# Patient Record
Sex: MALE | Race: WHITE | NOT HISPANIC OR LATINO | Employment: FULL TIME | RURAL
[De-identification: names, ages, dates, MRNs, and addresses within clinical notes are randomized per-mention and may not be internally consistent; named-entity substitution may affect disease eponyms.]

---

## 2018-01-22 ENCOUNTER — HISTORICAL (OUTPATIENT)
Dept: ADMINISTRATIVE | Facility: HOSPITAL | Age: 53
End: 2018-01-22

## 2018-01-23 LAB
LAB AP CLINICAL INFORMATION: NORMAL
LAB AP DIAGNOSIS - HISTORICAL: NORMAL
LAB AP GROSS PATHOLOGY - HISTORICAL: NORMAL
LAB AP SPECIMEN SUBMITTED - HISTORICAL: NORMAL

## 2020-05-21 ENCOUNTER — HISTORICAL (OUTPATIENT)
Dept: ADMINISTRATIVE | Facility: HOSPITAL | Age: 55
End: 2020-05-21

## 2020-08-23 ENCOUNTER — HISTORICAL (OUTPATIENT)
Dept: ADMINISTRATIVE | Facility: HOSPITAL | Age: 55
End: 2020-08-23

## 2020-08-23 LAB
BASOPHILS # BLD AUTO: 0.04 X10E3/UL (ref 0–0.2)
BASOPHILS NFR BLD AUTO: 0.8 % (ref 0–1)
EOSINOPHIL # BLD AUTO: 0.43 X10E3/UL (ref 0–0.5)
EOSINOPHIL NFR BLD AUTO: 8.1 % (ref 1–4)
ERYTHROCYTE [DISTWIDTH] IN BLOOD BY AUTOMATED COUNT: 12.2 % (ref 11.5–14.5)
HCT VFR BLD AUTO: 41.4 % (ref 40–54)
HGB BLD-MCNC: 15 G/DL (ref 13.5–18)
IMM GRANULOCYTES # BLD AUTO: 0.02 X10E3/UL (ref 0–0.04)
IMM GRANULOCYTES NFR BLD: 0.4 % (ref 0–0.4)
LYMPHOCYTES # BLD AUTO: 2.1 X10E3/UL (ref 1–4.8)
LYMPHOCYTES NFR BLD AUTO: 39.5 % (ref 27–41)
MCH RBC QN AUTO: 31.3 PG (ref 27–31)
MCHC RBC AUTO-ENTMCNC: 36.2 G/DL (ref 32–36)
MCV RBC AUTO: 86.4 FL (ref 80–96)
MONOCYTES # BLD AUTO: 0.5 X10E3/UL (ref 0–0.8)
MONOCYTES NFR BLD AUTO: 9.4 % (ref 2–6)
MPC BLD CALC-MCNC: 9.6 FL (ref 9.4–12.4)
NEUTROPHILS # BLD AUTO: 2.22 X10E3/UL (ref 1.8–7.7)
NEUTROPHILS NFR BLD AUTO: 41.8 % (ref 53–65)
PLATELET # BLD AUTO: 235 X10E3/UL (ref 150–400)
RBC # BLD AUTO: 4.79 X10E6/UL (ref 4.6–6.2)
WBC # BLD AUTO: 5.31 X10E3/UL (ref 4.5–11)

## 2020-10-06 ENCOUNTER — HISTORICAL (OUTPATIENT)
Dept: ADMINISTRATIVE | Facility: HOSPITAL | Age: 55
End: 2020-10-06

## 2020-10-06 LAB
ALBUMIN SERPL BCP-MCNC: 4.3 G/DL (ref 3.5–5)
ALP SERPL-CCNC: 71 U/L (ref 45–115)
ALT SERPL W P-5'-P-CCNC: 49 U/L (ref 16–61)
ANION GAP SERPL CALCULATED.3IONS-SCNC: 13 MMOL/L
AST SERPL W P-5'-P-CCNC: 27 U/L (ref 15–37)
BILIRUB DIRECT SERPL-MCNC: 0.2 MG/DL (ref 0–0.2)
BILIRUB SERPL-MCNC: 0.9 MG/DL (ref 0–1.2)
BUN SERPL-MCNC: 13 MG/DL (ref 9–20)
CALCIUM SERPL-MCNC: 9.4 MG/DL (ref 8.4–10.2)
CHLORIDE SERPL-SCNC: 102 MMOL/L (ref 98–107)
CHOLEST SERPL-MCNC: 138 MG/DL
CHOLEST/HDLC SERPL: 3.6 {RATIO}
CO2 SERPL-SCNC: 27 MMOL/L (ref 22–30)
CREAT SERPL-MCNC: 0.8 MG/DL (ref 0.6–1.3)
EST. AVERAGE GLUCOSE BLD GHB EST-MCNC: 150 MG/DL
GLUCOSE SERPL-MCNC: 153 MG/DL (ref 70–105)
HBA1C MFR BLD HPLC: 7.1 % (ref 4.5–6.6)
HDLC SERPL-MCNC: 38 MG/DL
LDLC SERPL CALC-MCNC: 58 MG/DL
POTASSIUM SERPL-SCNC: 4.3 MMOL/L (ref 3.6–5)
PROT SERPL-MCNC: 7.3 G/DL (ref 6.4–8.2)
SODIUM SERPL-SCNC: 138 MMOL/L (ref 137–145)
TRIGL SERPL-MCNC: 212 MG/DL

## 2021-01-28 ENCOUNTER — HISTORICAL (OUTPATIENT)
Dept: ADMINISTRATIVE | Facility: HOSPITAL | Age: 56
End: 2021-01-28

## 2021-01-28 LAB — GLUCOSE SERPL-MCNC: 121 MG/DL (ref 70–105)

## 2021-01-29 LAB

## 2021-03-06 ENCOUNTER — HISTORICAL (OUTPATIENT)
Dept: ADMINISTRATIVE | Facility: HOSPITAL | Age: 56
End: 2021-03-06

## 2021-03-09 ENCOUNTER — HISTORICAL (OUTPATIENT)
Dept: ADMINISTRATIVE | Facility: HOSPITAL | Age: 56
End: 2021-03-09

## 2021-05-04 DIAGNOSIS — Z98.890 S/P RIGHT KNEE ARTHROSCOPY: Primary | ICD-10-CM

## 2021-05-20 ENCOUNTER — CLINICAL SUPPORT (OUTPATIENT)
Dept: REHABILITATION | Facility: HOSPITAL | Age: 56
End: 2021-05-20
Payer: COMMERCIAL

## 2021-05-20 DIAGNOSIS — M25.60 DECREASED RANGE OF MOTION WITH DECREASED STRENGTH: ICD-10-CM

## 2021-05-20 DIAGNOSIS — R53.1 DECREASED RANGE OF MOTION WITH DECREASED STRENGTH: ICD-10-CM

## 2021-05-20 PROCEDURE — 97110 THERAPEUTIC EXERCISES: CPT | Mod: CQ

## 2021-05-20 PROCEDURE — 97530 THERAPEUTIC ACTIVITIES: CPT | Mod: CQ

## 2021-05-20 PROCEDURE — 97016 VASOPNEUMATIC DEVICE THERAPY: CPT | Mod: CQ

## 2021-05-21 ENCOUNTER — CLINICAL SUPPORT (OUTPATIENT)
Dept: REHABILITATION | Facility: HOSPITAL | Age: 56
End: 2021-05-21
Payer: COMMERCIAL

## 2021-05-21 DIAGNOSIS — R53.1 DECREASED RANGE OF MOTION WITH DECREASED STRENGTH: ICD-10-CM

## 2021-05-21 DIAGNOSIS — M25.60 DECREASED RANGE OF MOTION WITH DECREASED STRENGTH: ICD-10-CM

## 2021-05-21 PROCEDURE — 97016 VASOPNEUMATIC DEVICE THERAPY: CPT | Mod: CQ

## 2021-05-21 PROCEDURE — 97110 THERAPEUTIC EXERCISES: CPT | Mod: CQ

## 2021-05-21 PROCEDURE — 97530 THERAPEUTIC ACTIVITIES: CPT | Mod: CQ

## 2021-05-24 ENCOUNTER — CLINICAL SUPPORT (OUTPATIENT)
Dept: REHABILITATION | Facility: HOSPITAL | Age: 56
End: 2021-05-24
Payer: COMMERCIAL

## 2021-05-24 DIAGNOSIS — M25.60 DECREASED RANGE OF MOTION WITH DECREASED STRENGTH: ICD-10-CM

## 2021-05-24 DIAGNOSIS — R53.1 DECREASED RANGE OF MOTION WITH DECREASED STRENGTH: ICD-10-CM

## 2021-05-24 PROCEDURE — 97110 THERAPEUTIC EXERCISES: CPT | Mod: CQ

## 2021-05-24 PROCEDURE — 97530 THERAPEUTIC ACTIVITIES: CPT | Mod: CQ

## 2021-05-26 ENCOUNTER — CLINICAL SUPPORT (OUTPATIENT)
Dept: REHABILITATION | Facility: HOSPITAL | Age: 56
End: 2021-05-26
Payer: COMMERCIAL

## 2021-05-26 DIAGNOSIS — R53.1 DECREASED RANGE OF MOTION WITH DECREASED STRENGTH: ICD-10-CM

## 2021-05-26 DIAGNOSIS — M25.60 DECREASED RANGE OF MOTION WITH DECREASED STRENGTH: ICD-10-CM

## 2021-05-26 PROCEDURE — 97110 THERAPEUTIC EXERCISES: CPT

## 2021-05-26 PROCEDURE — 97530 THERAPEUTIC ACTIVITIES: CPT

## 2021-06-01 ENCOUNTER — CLINICAL SUPPORT (OUTPATIENT)
Dept: REHABILITATION | Facility: HOSPITAL | Age: 56
End: 2021-06-01
Payer: COMMERCIAL

## 2021-06-01 DIAGNOSIS — Z98.890 S/P RIGHT KNEE ARTHROSCOPY: ICD-10-CM

## 2021-06-01 DIAGNOSIS — R53.1 DECREASED RANGE OF MOTION WITH DECREASED STRENGTH: ICD-10-CM

## 2021-06-01 DIAGNOSIS — M25.60 DECREASED RANGE OF MOTION WITH DECREASED STRENGTH: ICD-10-CM

## 2021-06-01 PROCEDURE — 97530 THERAPEUTIC ACTIVITIES: CPT | Mod: CQ

## 2021-06-01 PROCEDURE — 97110 THERAPEUTIC EXERCISES: CPT | Mod: CQ

## 2021-06-03 ENCOUNTER — CLINICAL SUPPORT (OUTPATIENT)
Dept: REHABILITATION | Facility: HOSPITAL | Age: 56
End: 2021-06-03
Payer: COMMERCIAL

## 2021-06-03 DIAGNOSIS — M25.60 DECREASED RANGE OF MOTION WITH DECREASED STRENGTH: ICD-10-CM

## 2021-06-03 DIAGNOSIS — R53.1 DECREASED RANGE OF MOTION WITH DECREASED STRENGTH: ICD-10-CM

## 2021-06-03 PROCEDURE — 97530 THERAPEUTIC ACTIVITIES: CPT | Mod: CQ

## 2021-06-03 PROCEDURE — 97110 THERAPEUTIC EXERCISES: CPT | Mod: CQ

## 2021-06-04 ENCOUNTER — CLINICAL SUPPORT (OUTPATIENT)
Dept: REHABILITATION | Facility: HOSPITAL | Age: 56
End: 2021-06-04
Payer: COMMERCIAL

## 2021-06-04 DIAGNOSIS — M25.60 DECREASED RANGE OF MOTION WITH DECREASED STRENGTH: ICD-10-CM

## 2021-06-04 DIAGNOSIS — R53.1 DECREASED RANGE OF MOTION WITH DECREASED STRENGTH: ICD-10-CM

## 2021-06-04 PROCEDURE — 97110 THERAPEUTIC EXERCISES: CPT | Mod: CQ

## 2021-06-04 PROCEDURE — 97530 THERAPEUTIC ACTIVITIES: CPT | Mod: CQ

## 2021-06-07 ENCOUNTER — CLINICAL SUPPORT (OUTPATIENT)
Dept: REHABILITATION | Facility: HOSPITAL | Age: 56
End: 2021-06-07
Payer: COMMERCIAL

## 2021-06-07 DIAGNOSIS — M25.60 DECREASED RANGE OF MOTION WITH DECREASED STRENGTH: ICD-10-CM

## 2021-06-07 DIAGNOSIS — R53.1 DECREASED RANGE OF MOTION WITH DECREASED STRENGTH: ICD-10-CM

## 2021-06-07 PROCEDURE — 97110 THERAPEUTIC EXERCISES: CPT | Mod: CQ

## 2021-06-07 PROCEDURE — 97530 THERAPEUTIC ACTIVITIES: CPT | Mod: CQ

## 2021-06-09 ENCOUNTER — CLINICAL SUPPORT (OUTPATIENT)
Dept: REHABILITATION | Facility: HOSPITAL | Age: 56
End: 2021-06-09
Payer: COMMERCIAL

## 2021-06-09 DIAGNOSIS — R53.1 DECREASED RANGE OF MOTION WITH DECREASED STRENGTH: ICD-10-CM

## 2021-06-09 DIAGNOSIS — M25.60 DECREASED RANGE OF MOTION WITH DECREASED STRENGTH: ICD-10-CM

## 2021-06-09 PROCEDURE — 97530 THERAPEUTIC ACTIVITIES: CPT | Mod: CQ

## 2021-06-09 PROCEDURE — 97110 THERAPEUTIC EXERCISES: CPT | Mod: CQ

## 2021-06-11 ENCOUNTER — CLINICAL SUPPORT (OUTPATIENT)
Dept: REHABILITATION | Facility: HOSPITAL | Age: 56
End: 2021-06-11
Payer: COMMERCIAL

## 2021-06-11 DIAGNOSIS — R53.1 DECREASED RANGE OF MOTION WITH DECREASED STRENGTH: ICD-10-CM

## 2021-06-11 DIAGNOSIS — M25.60 DECREASED RANGE OF MOTION WITH DECREASED STRENGTH: ICD-10-CM

## 2021-06-11 PROCEDURE — 97530 THERAPEUTIC ACTIVITIES: CPT

## 2021-06-11 PROCEDURE — 97110 THERAPEUTIC EXERCISES: CPT

## 2021-06-15 ENCOUNTER — CLINICAL SUPPORT (OUTPATIENT)
Dept: REHABILITATION | Facility: HOSPITAL | Age: 56
End: 2021-06-15
Payer: COMMERCIAL

## 2021-06-15 DIAGNOSIS — R53.1 DECREASED RANGE OF MOTION WITH DECREASED STRENGTH: ICD-10-CM

## 2021-06-15 DIAGNOSIS — M25.60 DECREASED RANGE OF MOTION WITH DECREASED STRENGTH: ICD-10-CM

## 2021-06-15 PROCEDURE — 97530 THERAPEUTIC ACTIVITIES: CPT

## 2021-06-15 PROCEDURE — 97110 THERAPEUTIC EXERCISES: CPT

## 2021-06-16 ENCOUNTER — CLINICAL SUPPORT (OUTPATIENT)
Dept: REHABILITATION | Facility: HOSPITAL | Age: 56
End: 2021-06-16
Payer: COMMERCIAL

## 2021-06-16 DIAGNOSIS — M25.60 DECREASED RANGE OF MOTION WITH DECREASED STRENGTH: ICD-10-CM

## 2021-06-16 DIAGNOSIS — R53.1 DECREASED RANGE OF MOTION WITH DECREASED STRENGTH: ICD-10-CM

## 2021-06-16 PROCEDURE — 97110 THERAPEUTIC EXERCISES: CPT

## 2021-06-16 PROCEDURE — 97530 THERAPEUTIC ACTIVITIES: CPT

## 2021-06-18 ENCOUNTER — CLINICAL SUPPORT (OUTPATIENT)
Dept: REHABILITATION | Facility: HOSPITAL | Age: 56
End: 2021-06-18
Payer: COMMERCIAL

## 2021-06-18 DIAGNOSIS — M25.60 DECREASED RANGE OF MOTION WITH DECREASED STRENGTH: ICD-10-CM

## 2021-06-18 DIAGNOSIS — R53.1 DECREASED RANGE OF MOTION WITH DECREASED STRENGTH: ICD-10-CM

## 2021-06-18 PROCEDURE — 97530 THERAPEUTIC ACTIVITIES: CPT | Mod: CQ

## 2021-06-18 PROCEDURE — 97110 THERAPEUTIC EXERCISES: CPT | Mod: CQ

## 2021-06-22 ENCOUNTER — CLINICAL SUPPORT (OUTPATIENT)
Dept: REHABILITATION | Facility: HOSPITAL | Age: 56
End: 2021-06-22
Payer: COMMERCIAL

## 2021-06-22 DIAGNOSIS — R53.1 DECREASED RANGE OF MOTION WITH DECREASED STRENGTH: ICD-10-CM

## 2021-06-22 DIAGNOSIS — M25.60 DECREASED RANGE OF MOTION WITH DECREASED STRENGTH: ICD-10-CM

## 2021-06-22 PROCEDURE — 97530 THERAPEUTIC ACTIVITIES: CPT | Mod: CQ

## 2021-06-22 PROCEDURE — 97110 THERAPEUTIC EXERCISES: CPT | Mod: CQ

## 2021-06-23 ENCOUNTER — OFFICE VISIT (OUTPATIENT)
Dept: FAMILY MEDICINE | Facility: CLINIC | Age: 56
End: 2021-06-23
Payer: COMMERCIAL

## 2021-06-23 VITALS
HEART RATE: 74 BPM | DIASTOLIC BLOOD PRESSURE: 102 MMHG | SYSTOLIC BLOOD PRESSURE: 156 MMHG | HEIGHT: 72 IN | TEMPERATURE: 98 F | BODY MASS INDEX: 31.45 KG/M2 | WEIGHT: 232.19 LBS | OXYGEN SATURATION: 98 %

## 2021-06-23 DIAGNOSIS — M25.562 ACUTE PAIN OF LEFT KNEE: Primary | ICD-10-CM

## 2021-06-23 PROCEDURE — 99213 OFFICE O/P EST LOW 20 MIN: CPT | Mod: 25,,, | Performed by: FAMILY MEDICINE

## 2021-06-23 PROCEDURE — 99213 PR OFFICE/OUTPT VISIT, EST, LEVL III, 20-29 MIN: ICD-10-PCS | Mod: 25,,, | Performed by: FAMILY MEDICINE

## 2021-06-23 PROCEDURE — 20610 DRAIN/INJ JOINT/BURSA W/O US: CPT | Mod: LT,,, | Performed by: FAMILY MEDICINE

## 2021-06-23 PROCEDURE — 20610 LARGE JOINT ASPIRATION/INJECTION: L KNEE: ICD-10-PCS | Mod: LT,,, | Performed by: FAMILY MEDICINE

## 2021-06-23 RX ORDER — METHYLPREDNISOLONE ACETATE 80 MG/ML
40 INJECTION, SUSPENSION INTRA-ARTICULAR; INTRALESIONAL; INTRAMUSCULAR; SOFT TISSUE
Status: DISCONTINUED | OUTPATIENT
Start: 2021-06-23 | End: 2021-06-23 | Stop reason: HOSPADM

## 2021-06-23 RX ORDER — MINERAL OIL
180 ENEMA (ML) RECTAL DAILY
COMMUNITY

## 2021-06-23 RX ORDER — CLOPIDOGREL BISULFATE 75 MG/1
75 TABLET ORAL DAILY
COMMUNITY
End: 2021-11-02 | Stop reason: ALTCHOICE

## 2021-06-23 RX ORDER — ASPIRIN 81 MG/1
81 TABLET ORAL DAILY
COMMUNITY

## 2021-06-23 RX ORDER — ATORVASTATIN CALCIUM 80 MG/1
80 TABLET, FILM COATED ORAL DAILY
COMMUNITY

## 2021-06-23 RX ORDER — LISINOPRIL 2.5 MG/1
5 TABLET ORAL DAILY
COMMUNITY
Start: 2021-05-24 | End: 2021-11-02 | Stop reason: DRUGHIGH

## 2021-06-23 RX ORDER — CARVEDILOL 6.25 MG/1
6.25 TABLET ORAL 2 TIMES DAILY
COMMUNITY
Start: 2021-05-27

## 2021-06-23 RX ORDER — FLUTICASONE PROPIONATE 50 MCG
2 SPRAY, SUSPENSION (ML) NASAL DAILY
COMMUNITY
End: 2023-05-01 | Stop reason: SDUPTHER

## 2021-06-23 RX ORDER — MONTELUKAST SODIUM 10 MG/1
10 TABLET ORAL NIGHTLY
COMMUNITY
End: 2021-08-24 | Stop reason: SDUPTHER

## 2021-06-23 RX ORDER — MULTIVIT WITH MINERALS/HERBS
1 TABLET ORAL DAILY
COMMUNITY

## 2021-06-23 RX ADMIN — METHYLPREDNISOLONE ACETATE 40 MG: 80 INJECTION, SUSPENSION INTRA-ARTICULAR; INTRALESIONAL; INTRAMUSCULAR; SOFT TISSUE at 01:06

## 2021-06-24 ENCOUNTER — CLINICAL SUPPORT (OUTPATIENT)
Dept: REHABILITATION | Facility: HOSPITAL | Age: 56
End: 2021-06-24
Payer: COMMERCIAL

## 2021-06-24 DIAGNOSIS — R53.1 DECREASED RANGE OF MOTION WITH DECREASED STRENGTH: ICD-10-CM

## 2021-06-24 DIAGNOSIS — M25.60 DECREASED RANGE OF MOTION WITH DECREASED STRENGTH: ICD-10-CM

## 2021-06-24 PROCEDURE — 97016 VASOPNEUMATIC DEVICE THERAPY: CPT

## 2021-06-24 PROCEDURE — 97110 THERAPEUTIC EXERCISES: CPT

## 2021-06-24 PROCEDURE — 97530 THERAPEUTIC ACTIVITIES: CPT

## 2021-06-25 ENCOUNTER — CLINICAL SUPPORT (OUTPATIENT)
Dept: REHABILITATION | Facility: HOSPITAL | Age: 56
End: 2021-06-25
Payer: COMMERCIAL

## 2021-06-25 DIAGNOSIS — R53.1 DECREASED RANGE OF MOTION WITH DECREASED STRENGTH: ICD-10-CM

## 2021-06-25 DIAGNOSIS — M25.60 DECREASED RANGE OF MOTION WITH DECREASED STRENGTH: ICD-10-CM

## 2021-06-25 PROCEDURE — 97530 THERAPEUTIC ACTIVITIES: CPT | Mod: CQ

## 2021-06-25 PROCEDURE — 97110 THERAPEUTIC EXERCISES: CPT | Mod: CQ

## 2021-06-28 ENCOUNTER — CLINICAL SUPPORT (OUTPATIENT)
Dept: REHABILITATION | Facility: HOSPITAL | Age: 56
End: 2021-06-28
Payer: COMMERCIAL

## 2021-06-28 DIAGNOSIS — R53.1 DECREASED RANGE OF MOTION WITH DECREASED STRENGTH: ICD-10-CM

## 2021-06-28 DIAGNOSIS — M25.60 DECREASED RANGE OF MOTION WITH DECREASED STRENGTH: ICD-10-CM

## 2021-06-28 PROCEDURE — 97530 THERAPEUTIC ACTIVITIES: CPT

## 2021-06-28 PROCEDURE — 97110 THERAPEUTIC EXERCISES: CPT

## 2021-07-02 ENCOUNTER — CLINICAL SUPPORT (OUTPATIENT)
Dept: REHABILITATION | Facility: HOSPITAL | Age: 56
End: 2021-07-02
Payer: COMMERCIAL

## 2021-07-02 DIAGNOSIS — R53.1 DECREASED RANGE OF MOTION WITH DECREASED STRENGTH: ICD-10-CM

## 2021-07-02 DIAGNOSIS — M25.60 DECREASED RANGE OF MOTION WITH DECREASED STRENGTH: ICD-10-CM

## 2021-07-02 PROCEDURE — 97110 THERAPEUTIC EXERCISES: CPT | Mod: CQ

## 2021-07-02 PROCEDURE — 97530 THERAPEUTIC ACTIVITIES: CPT | Mod: CQ

## 2021-07-06 ENCOUNTER — CLINICAL SUPPORT (OUTPATIENT)
Dept: REHABILITATION | Facility: HOSPITAL | Age: 56
End: 2021-07-06
Payer: COMMERCIAL

## 2021-07-06 DIAGNOSIS — M25.60 DECREASED RANGE OF MOTION WITH DECREASED STRENGTH: ICD-10-CM

## 2021-07-06 DIAGNOSIS — R53.1 DECREASED RANGE OF MOTION WITH DECREASED STRENGTH: ICD-10-CM

## 2021-07-06 PROCEDURE — 97530 THERAPEUTIC ACTIVITIES: CPT

## 2021-07-06 PROCEDURE — 97110 THERAPEUTIC EXERCISES: CPT

## 2021-07-07 ENCOUNTER — CLINICAL SUPPORT (OUTPATIENT)
Dept: REHABILITATION | Facility: HOSPITAL | Age: 56
End: 2021-07-07
Payer: COMMERCIAL

## 2021-07-07 DIAGNOSIS — M25.60 DECREASED RANGE OF MOTION WITH DECREASED STRENGTH: ICD-10-CM

## 2021-07-07 DIAGNOSIS — R53.1 DECREASED RANGE OF MOTION WITH DECREASED STRENGTH: ICD-10-CM

## 2021-07-07 PROCEDURE — 97530 THERAPEUTIC ACTIVITIES: CPT

## 2021-07-07 PROCEDURE — 97110 THERAPEUTIC EXERCISES: CPT

## 2021-07-13 ENCOUNTER — CLINICAL SUPPORT (OUTPATIENT)
Dept: REHABILITATION | Facility: HOSPITAL | Age: 56
End: 2021-07-13
Payer: COMMERCIAL

## 2021-07-13 DIAGNOSIS — M25.60 DECREASED RANGE OF MOTION WITH DECREASED STRENGTH: ICD-10-CM

## 2021-07-13 DIAGNOSIS — R53.1 DECREASED RANGE OF MOTION WITH DECREASED STRENGTH: ICD-10-CM

## 2021-07-13 PROCEDURE — 97110 THERAPEUTIC EXERCISES: CPT | Mod: CQ

## 2021-07-13 PROCEDURE — 97530 THERAPEUTIC ACTIVITIES: CPT | Mod: CQ

## 2021-07-16 ENCOUNTER — CLINICAL SUPPORT (OUTPATIENT)
Dept: REHABILITATION | Facility: HOSPITAL | Age: 56
End: 2021-07-16
Payer: COMMERCIAL

## 2021-07-16 DIAGNOSIS — R53.1 DECREASED RANGE OF MOTION WITH DECREASED STRENGTH: ICD-10-CM

## 2021-07-16 DIAGNOSIS — M25.60 DECREASED RANGE OF MOTION WITH DECREASED STRENGTH: ICD-10-CM

## 2021-07-16 PROCEDURE — 97530 THERAPEUTIC ACTIVITIES: CPT

## 2021-07-16 PROCEDURE — 97110 THERAPEUTIC EXERCISES: CPT

## 2021-08-24 ENCOUNTER — OFFICE VISIT (OUTPATIENT)
Dept: FAMILY MEDICINE | Facility: CLINIC | Age: 56
End: 2021-08-24
Payer: COMMERCIAL

## 2021-08-24 VITALS
SYSTOLIC BLOOD PRESSURE: 153 MMHG | TEMPERATURE: 98 F | BODY MASS INDEX: 32.89 KG/M2 | HEIGHT: 72 IN | WEIGHT: 242.81 LBS | OXYGEN SATURATION: 97 % | HEART RATE: 81 BPM | DIASTOLIC BLOOD PRESSURE: 100 MMHG

## 2021-08-24 DIAGNOSIS — J30.89 NON-SEASONAL ALLERGIC RHINITIS DUE TO OTHER ALLERGIC TRIGGER: ICD-10-CM

## 2021-08-24 DIAGNOSIS — R05.9 COUGH: Primary | ICD-10-CM

## 2021-08-24 DIAGNOSIS — K59.00 CONSTIPATION, UNSPECIFIED CONSTIPATION TYPE: ICD-10-CM

## 2021-08-24 DIAGNOSIS — J01.01 ACUTE RECURRENT MAXILLARY SINUSITIS: ICD-10-CM

## 2021-08-24 PROCEDURE — 96372 THER/PROPH/DIAG INJ SC/IM: CPT | Mod: ,,, | Performed by: FAMILY MEDICINE

## 2021-08-24 PROCEDURE — 3080F PR MOST RECENT DIASTOLIC BLOOD PRESSURE >= 90 MM HG: ICD-10-PCS | Mod: CPTII,,, | Performed by: FAMILY MEDICINE

## 2021-08-24 PROCEDURE — 99213 OFFICE O/P EST LOW 20 MIN: CPT | Mod: 25,,, | Performed by: FAMILY MEDICINE

## 2021-08-24 PROCEDURE — 3008F PR BODY MASS INDEX (BMI) DOCUMENTED: ICD-10-PCS | Mod: CPTII,,, | Performed by: FAMILY MEDICINE

## 2021-08-24 PROCEDURE — 3077F PR MOST RECENT SYSTOLIC BLOOD PRESSURE >= 140 MM HG: ICD-10-PCS | Mod: CPTII,,, | Performed by: FAMILY MEDICINE

## 2021-08-24 PROCEDURE — 3077F SYST BP >= 140 MM HG: CPT | Mod: CPTII,,, | Performed by: FAMILY MEDICINE

## 2021-08-24 PROCEDURE — 96372 PR INJECTION,THERAP/PROPH/DIAG2ST, IM OR SUBCUT: ICD-10-PCS | Mod: ,,, | Performed by: FAMILY MEDICINE

## 2021-08-24 PROCEDURE — 99213 PR OFFICE/OUTPT VISIT, EST, LEVL III, 20-29 MIN: ICD-10-PCS | Mod: 25,,, | Performed by: FAMILY MEDICINE

## 2021-08-24 PROCEDURE — 1159F PR MEDICATION LIST DOCUMENTED IN MEDICAL RECORD: ICD-10-PCS | Mod: CPTII,,, | Performed by: FAMILY MEDICINE

## 2021-08-24 PROCEDURE — 1159F MED LIST DOCD IN RCRD: CPT | Mod: CPTII,,, | Performed by: FAMILY MEDICINE

## 2021-08-24 PROCEDURE — 3008F BODY MASS INDEX DOCD: CPT | Mod: CPTII,,, | Performed by: FAMILY MEDICINE

## 2021-08-24 PROCEDURE — 3080F DIAST BP >= 90 MM HG: CPT | Mod: CPTII,,, | Performed by: FAMILY MEDICINE

## 2021-08-24 RX ORDER — DEXAMETHASONE SODIUM PHOSPHATE 4 MG/ML
4 INJECTION, SOLUTION INTRA-ARTICULAR; INTRALESIONAL; INTRAMUSCULAR; INTRAVENOUS; SOFT TISSUE
Status: COMPLETED | OUTPATIENT
Start: 2021-08-24 | End: 2021-08-24

## 2021-08-24 RX ORDER — LACTULOSE 10 G/15ML
10 SOLUTION ORAL 2 TIMES DAILY
Qty: 420 ML | Refills: 0 | Status: SHIPPED | OUTPATIENT
Start: 2021-08-24 | End: 2022-08-22

## 2021-08-24 RX ORDER — METHYLPREDNISOLONE ACETATE 80 MG/ML
40 INJECTION, SUSPENSION INTRA-ARTICULAR; INTRALESIONAL; INTRAMUSCULAR; SOFT TISSUE
Status: COMPLETED | OUTPATIENT
Start: 2021-08-24 | End: 2021-08-24

## 2021-08-24 RX ORDER — MONTELUKAST SODIUM 10 MG/1
10 TABLET ORAL NIGHTLY
Qty: 30 TABLET | Refills: 0 | Status: SHIPPED | OUTPATIENT
Start: 2021-08-24 | End: 2021-08-25 | Stop reason: SDUPTHER

## 2021-08-24 RX ORDER — CEFTRIAXONE 1 G/1
1 INJECTION, POWDER, FOR SOLUTION INTRAMUSCULAR; INTRAVENOUS
Status: COMPLETED | OUTPATIENT
Start: 2021-08-24 | End: 2021-08-24

## 2021-08-24 RX ADMIN — CEFTRIAXONE 1 G: 1 INJECTION, POWDER, FOR SOLUTION INTRAMUSCULAR; INTRAVENOUS at 04:08

## 2021-08-24 RX ADMIN — DEXAMETHASONE SODIUM PHOSPHATE 4 MG: 4 INJECTION, SOLUTION INTRA-ARTICULAR; INTRALESIONAL; INTRAMUSCULAR; INTRAVENOUS; SOFT TISSUE at 04:08

## 2021-08-24 RX ADMIN — METHYLPREDNISOLONE ACETATE 40 MG: 80 INJECTION, SUSPENSION INTRA-ARTICULAR; INTRALESIONAL; INTRAMUSCULAR; SOFT TISSUE at 04:08

## 2021-08-25 RX ORDER — MONTELUKAST SODIUM 10 MG/1
10 TABLET ORAL NIGHTLY
Qty: 90 TABLET | Refills: 1 | Status: SHIPPED | OUTPATIENT
Start: 2021-08-25 | End: 2022-03-28

## 2021-11-02 ENCOUNTER — OFFICE VISIT (OUTPATIENT)
Dept: FAMILY MEDICINE | Facility: CLINIC | Age: 56
End: 2021-11-02
Payer: COMMERCIAL

## 2021-11-02 VITALS
SYSTOLIC BLOOD PRESSURE: 116 MMHG | OXYGEN SATURATION: 99 % | DIASTOLIC BLOOD PRESSURE: 81 MMHG | BODY MASS INDEX: 32.29 KG/M2 | WEIGHT: 238.38 LBS | HEART RATE: 81 BPM | HEIGHT: 72 IN | TEMPERATURE: 98 F

## 2021-11-02 DIAGNOSIS — R73.9 HYPERGLYCEMIA: ICD-10-CM

## 2021-11-02 DIAGNOSIS — J01.01 ACUTE RECURRENT MAXILLARY SINUSITIS: ICD-10-CM

## 2021-11-02 DIAGNOSIS — E78.5 HYPERLIPIDEMIA, UNSPECIFIED HYPERLIPIDEMIA TYPE: ICD-10-CM

## 2021-11-02 DIAGNOSIS — Z00.00 WELL ADULT EXAM: Primary | ICD-10-CM

## 2021-11-02 DIAGNOSIS — I10 HTN (HYPERTENSION), BENIGN: ICD-10-CM

## 2021-11-02 LAB
CHOLEST SERPL-MCNC: 134 MG/DL (ref 0–200)
CHOLEST/HDLC SERPL: 3.6 {RATIO}
GLUCOSE SERPL-MCNC: 203 MG/DL (ref 74–106)
HDLC SERPL-MCNC: 37 MG/DL (ref 40–60)
LDLC SERPL CALC-MCNC: 42 MG/DL
LDLC/HDLC SERPL: 1.1 {RATIO}
NONHDLC SERPL-MCNC: 97 MG/DL
TRIGL SERPL-MCNC: 273 MG/DL (ref 35–150)
VLDLC SERPL-MCNC: 55 MG/DL

## 2021-11-02 PROCEDURE — 83036 HEMOGLOBIN GLYCOSYLATED A1C: CPT | Mod: ,,, | Performed by: CLINICAL MEDICAL LABORATORY

## 2021-11-02 PROCEDURE — 96372 THER/PROPH/DIAG INJ SC/IM: CPT | Mod: ,,, | Performed by: FAMILY MEDICINE

## 2021-11-02 PROCEDURE — 3008F PR BODY MASS INDEX (BMI) DOCUMENTED: ICD-10-PCS | Mod: CPTII,,, | Performed by: FAMILY MEDICINE

## 2021-11-02 PROCEDURE — 83036 HEMOGLOBIN A1C: ICD-10-PCS | Mod: ,,, | Performed by: CLINICAL MEDICAL LABORATORY

## 2021-11-02 PROCEDURE — 3074F PR MOST RECENT SYSTOLIC BLOOD PRESSURE < 130 MM HG: ICD-10-PCS | Mod: CPTII,,, | Performed by: FAMILY MEDICINE

## 2021-11-02 PROCEDURE — 1159F PR MEDICATION LIST DOCUMENTED IN MEDICAL RECORD: ICD-10-PCS | Mod: CPTII,,, | Performed by: FAMILY MEDICINE

## 2021-11-02 PROCEDURE — 82947 GLUCOSE, FASTING: ICD-10-PCS | Mod: ,,, | Performed by: CLINICAL MEDICAL LABORATORY

## 2021-11-02 PROCEDURE — 1159F MED LIST DOCD IN RCRD: CPT | Mod: CPTII,,, | Performed by: FAMILY MEDICINE

## 2021-11-02 PROCEDURE — 4010F PR ACE/ARB THEARPY RXD/TAKEN: ICD-10-PCS | Mod: CPTII,,, | Performed by: FAMILY MEDICINE

## 2021-11-02 PROCEDURE — 3008F BODY MASS INDEX DOCD: CPT | Mod: CPTII,,, | Performed by: FAMILY MEDICINE

## 2021-11-02 PROCEDURE — 3079F DIAST BP 80-89 MM HG: CPT | Mod: CPTII,,, | Performed by: FAMILY MEDICINE

## 2021-11-02 PROCEDURE — 99396 PR PREVENTIVE VISIT,EST,40-64: ICD-10-PCS | Mod: 25,,, | Performed by: FAMILY MEDICINE

## 2021-11-02 PROCEDURE — 82947 ASSAY GLUCOSE BLOOD QUANT: CPT | Mod: ,,, | Performed by: CLINICAL MEDICAL LABORATORY

## 2021-11-02 PROCEDURE — 80061 LIPID PANEL: CPT | Mod: ,,, | Performed by: CLINICAL MEDICAL LABORATORY

## 2021-11-02 PROCEDURE — 4010F ACE/ARB THERAPY RXD/TAKEN: CPT | Mod: CPTII,,, | Performed by: FAMILY MEDICINE

## 2021-11-02 PROCEDURE — 3074F SYST BP LT 130 MM HG: CPT | Mod: CPTII,,, | Performed by: FAMILY MEDICINE

## 2021-11-02 PROCEDURE — 80061 LIPID PANEL: ICD-10-PCS | Mod: ,,, | Performed by: CLINICAL MEDICAL LABORATORY

## 2021-11-02 PROCEDURE — 96372 PR INJECTION,THERAP/PROPH/DIAG2ST, IM OR SUBCUT: ICD-10-PCS | Mod: ,,, | Performed by: FAMILY MEDICINE

## 2021-11-02 PROCEDURE — 99396 PREV VISIT EST AGE 40-64: CPT | Mod: 25,,, | Performed by: FAMILY MEDICINE

## 2021-11-02 PROCEDURE — 3079F PR MOST RECENT DIASTOLIC BLOOD PRESSURE 80-89 MM HG: ICD-10-PCS | Mod: CPTII,,, | Performed by: FAMILY MEDICINE

## 2021-11-02 RX ORDER — DEXAMETHASONE SODIUM PHOSPHATE 4 MG/ML
4 INJECTION, SOLUTION INTRA-ARTICULAR; INTRALESIONAL; INTRAMUSCULAR; INTRAVENOUS; SOFT TISSUE
Status: COMPLETED | OUTPATIENT
Start: 2021-11-02 | End: 2021-11-02

## 2021-11-02 RX ORDER — METHYLPREDNISOLONE ACETATE 80 MG/ML
40 INJECTION, SUSPENSION INTRA-ARTICULAR; INTRALESIONAL; INTRAMUSCULAR; SOFT TISSUE
Status: COMPLETED | OUTPATIENT
Start: 2021-11-02 | End: 2021-11-02

## 2021-11-02 RX ORDER — LISINOPRIL 5 MG/1
1 TABLET ORAL DAILY
COMMUNITY
Start: 2021-10-02 | End: 2022-12-05 | Stop reason: DRUGHIGH

## 2021-11-02 RX ORDER — CEFTRIAXONE 1 G/1
1 INJECTION, POWDER, FOR SOLUTION INTRAMUSCULAR; INTRAVENOUS
Status: COMPLETED | OUTPATIENT
Start: 2021-11-02 | End: 2021-11-02

## 2021-11-02 RX ADMIN — CEFTRIAXONE 1 G: 1 INJECTION, POWDER, FOR SOLUTION INTRAMUSCULAR; INTRAVENOUS at 08:11

## 2021-11-02 RX ADMIN — METHYLPREDNISOLONE ACETATE 40 MG: 80 INJECTION, SUSPENSION INTRA-ARTICULAR; INTRALESIONAL; INTRAMUSCULAR; SOFT TISSUE at 08:11

## 2021-11-02 RX ADMIN — DEXAMETHASONE SODIUM PHOSPHATE 4 MG: 4 INJECTION, SOLUTION INTRA-ARTICULAR; INTRALESIONAL; INTRAMUSCULAR; INTRAVENOUS; SOFT TISSUE at 08:11

## 2021-11-03 LAB
EST. AVERAGE GLUCOSE BLD GHB EST-MCNC: 144 MG/DL
HBA1C MFR BLD HPLC: 6.9 % (ref 4.5–6.6)

## 2021-11-10 ENCOUNTER — PATIENT MESSAGE (OUTPATIENT)
Dept: FAMILY MEDICINE | Facility: CLINIC | Age: 56
End: 2021-11-10
Payer: COMMERCIAL

## 2021-12-06 ENCOUNTER — OFFICE VISIT (OUTPATIENT)
Dept: FAMILY MEDICINE | Facility: CLINIC | Age: 56
End: 2021-12-06
Payer: COMMERCIAL

## 2021-12-06 VITALS
HEART RATE: 77 BPM | TEMPERATURE: 97 F | OXYGEN SATURATION: 96 % | HEIGHT: 73 IN | DIASTOLIC BLOOD PRESSURE: 82 MMHG | BODY MASS INDEX: 29.21 KG/M2 | SYSTOLIC BLOOD PRESSURE: 118 MMHG | WEIGHT: 220.38 LBS

## 2021-12-06 DIAGNOSIS — J01.00 ACUTE NON-RECURRENT MAXILLARY SINUSITIS: Primary | ICD-10-CM

## 2021-12-06 DIAGNOSIS — R05.9 COUGH: ICD-10-CM

## 2021-12-06 PROCEDURE — 3074F PR MOST RECENT SYSTOLIC BLOOD PRESSURE < 130 MM HG: ICD-10-PCS | Mod: CPTII,,, | Performed by: FAMILY MEDICINE

## 2021-12-06 PROCEDURE — 3008F PR BODY MASS INDEX (BMI) DOCUMENTED: ICD-10-PCS | Mod: CPTII,,, | Performed by: FAMILY MEDICINE

## 2021-12-06 PROCEDURE — 3079F DIAST BP 80-89 MM HG: CPT | Mod: CPTII,,, | Performed by: FAMILY MEDICINE

## 2021-12-06 PROCEDURE — 4010F ACE/ARB THERAPY RXD/TAKEN: CPT | Mod: CPTII,,, | Performed by: FAMILY MEDICINE

## 2021-12-06 PROCEDURE — 4010F PR ACE/ARB THEARPY RXD/TAKEN: ICD-10-PCS | Mod: CPTII,,, | Performed by: FAMILY MEDICINE

## 2021-12-06 PROCEDURE — 96372 PR INJECTION,THERAP/PROPH/DIAG2ST, IM OR SUBCUT: ICD-10-PCS | Mod: ,,, | Performed by: FAMILY MEDICINE

## 2021-12-06 PROCEDURE — 96372 THER/PROPH/DIAG INJ SC/IM: CPT | Mod: ,,, | Performed by: FAMILY MEDICINE

## 2021-12-06 PROCEDURE — 3044F PR MOST RECENT HEMOGLOBIN A1C LEVEL <7.0%: ICD-10-PCS | Mod: CPTII,,, | Performed by: FAMILY MEDICINE

## 2021-12-06 PROCEDURE — 99213 PR OFFICE/OUTPT VISIT, EST, LEVL III, 20-29 MIN: ICD-10-PCS | Mod: 25,,, | Performed by: FAMILY MEDICINE

## 2021-12-06 PROCEDURE — 3008F BODY MASS INDEX DOCD: CPT | Mod: CPTII,,, | Performed by: FAMILY MEDICINE

## 2021-12-06 PROCEDURE — 3074F SYST BP LT 130 MM HG: CPT | Mod: CPTII,,, | Performed by: FAMILY MEDICINE

## 2021-12-06 PROCEDURE — 3079F PR MOST RECENT DIASTOLIC BLOOD PRESSURE 80-89 MM HG: ICD-10-PCS | Mod: CPTII,,, | Performed by: FAMILY MEDICINE

## 2021-12-06 PROCEDURE — 3044F HG A1C LEVEL LT 7.0%: CPT | Mod: CPTII,,, | Performed by: FAMILY MEDICINE

## 2021-12-06 PROCEDURE — 99213 OFFICE O/P EST LOW 20 MIN: CPT | Mod: 25,,, | Performed by: FAMILY MEDICINE

## 2021-12-06 RX ORDER — CEFTRIAXONE 1 G/1
1 INJECTION, POWDER, FOR SOLUTION INTRAMUSCULAR; INTRAVENOUS
Status: COMPLETED | OUTPATIENT
Start: 2021-12-06 | End: 2021-12-06

## 2021-12-06 RX ORDER — CEFUROXIME AXETIL 250 MG/1
250 TABLET ORAL EVERY 12 HOURS
Qty: 20 TABLET | Refills: 0 | Status: SHIPPED | OUTPATIENT
Start: 2021-12-06 | End: 2022-08-22

## 2021-12-06 RX ORDER — DEXAMETHASONE SODIUM PHOSPHATE 4 MG/ML
4 INJECTION, SOLUTION INTRA-ARTICULAR; INTRALESIONAL; INTRAMUSCULAR; INTRAVENOUS; SOFT TISSUE
Status: COMPLETED | OUTPATIENT
Start: 2021-12-06 | End: 2021-12-06

## 2021-12-06 RX ORDER — METHYLPREDNISOLONE ACETATE 80 MG/ML
40 INJECTION, SUSPENSION INTRA-ARTICULAR; INTRALESIONAL; INTRAMUSCULAR; SOFT TISSUE
Status: COMPLETED | OUTPATIENT
Start: 2021-12-06 | End: 2021-12-06

## 2021-12-06 RX ADMIN — CEFTRIAXONE 1 G: 1 INJECTION, POWDER, FOR SOLUTION INTRAMUSCULAR; INTRAVENOUS at 08:12

## 2021-12-06 RX ADMIN — METHYLPREDNISOLONE ACETATE 40 MG: 80 INJECTION, SUSPENSION INTRA-ARTICULAR; INTRALESIONAL; INTRAMUSCULAR; SOFT TISSUE at 08:12

## 2021-12-06 RX ADMIN — DEXAMETHASONE SODIUM PHOSPHATE 4 MG: 4 INJECTION, SOLUTION INTRA-ARTICULAR; INTRALESIONAL; INTRAMUSCULAR; INTRAVENOUS; SOFT TISSUE at 08:12

## 2021-12-13 ENCOUNTER — OFFICE VISIT (OUTPATIENT)
Dept: FAMILY MEDICINE | Facility: CLINIC | Age: 56
End: 2021-12-13
Payer: COMMERCIAL

## 2021-12-13 VITALS
DIASTOLIC BLOOD PRESSURE: 82 MMHG | WEIGHT: 214.63 LBS | HEIGHT: 73 IN | OXYGEN SATURATION: 98 % | HEART RATE: 73 BPM | SYSTOLIC BLOOD PRESSURE: 117 MMHG | TEMPERATURE: 97 F | BODY MASS INDEX: 28.44 KG/M2

## 2021-12-13 DIAGNOSIS — R05.9 COUGH: ICD-10-CM

## 2021-12-13 DIAGNOSIS — J01.00 ACUTE NON-RECURRENT MAXILLARY SINUSITIS: Primary | ICD-10-CM

## 2021-12-13 DIAGNOSIS — R06.2 WHEEZE: ICD-10-CM

## 2021-12-13 PROCEDURE — 4010F PR ACE/ARB THEARPY RXD/TAKEN: ICD-10-PCS | Mod: CPTII,,, | Performed by: FAMILY MEDICINE

## 2021-12-13 PROCEDURE — 3008F BODY MASS INDEX DOCD: CPT | Mod: CPTII,,, | Performed by: FAMILY MEDICINE

## 2021-12-13 PROCEDURE — 96372 PR INJECTION,THERAP/PROPH/DIAG2ST, IM OR SUBCUT: ICD-10-PCS | Mod: ,,, | Performed by: FAMILY MEDICINE

## 2021-12-13 PROCEDURE — 3044F PR MOST RECENT HEMOGLOBIN A1C LEVEL <7.0%: ICD-10-PCS | Mod: CPTII,,, | Performed by: FAMILY MEDICINE

## 2021-12-13 PROCEDURE — 3074F SYST BP LT 130 MM HG: CPT | Mod: CPTII,,, | Performed by: FAMILY MEDICINE

## 2021-12-13 PROCEDURE — 3044F HG A1C LEVEL LT 7.0%: CPT | Mod: CPTII,,, | Performed by: FAMILY MEDICINE

## 2021-12-13 PROCEDURE — 3079F PR MOST RECENT DIASTOLIC BLOOD PRESSURE 80-89 MM HG: ICD-10-PCS | Mod: CPTII,,, | Performed by: FAMILY MEDICINE

## 2021-12-13 PROCEDURE — 99213 PR OFFICE/OUTPT VISIT, EST, LEVL III, 20-29 MIN: ICD-10-PCS | Mod: 25,,, | Performed by: FAMILY MEDICINE

## 2021-12-13 PROCEDURE — 3079F DIAST BP 80-89 MM HG: CPT | Mod: CPTII,,, | Performed by: FAMILY MEDICINE

## 2021-12-13 PROCEDURE — 1159F MED LIST DOCD IN RCRD: CPT | Mod: CPTII,,, | Performed by: FAMILY MEDICINE

## 2021-12-13 PROCEDURE — 1159F PR MEDICATION LIST DOCUMENTED IN MEDICAL RECORD: ICD-10-PCS | Mod: CPTII,,, | Performed by: FAMILY MEDICINE

## 2021-12-13 PROCEDURE — 4010F ACE/ARB THERAPY RXD/TAKEN: CPT | Mod: CPTII,,, | Performed by: FAMILY MEDICINE

## 2021-12-13 PROCEDURE — 3074F PR MOST RECENT SYSTOLIC BLOOD PRESSURE < 130 MM HG: ICD-10-PCS | Mod: CPTII,,, | Performed by: FAMILY MEDICINE

## 2021-12-13 PROCEDURE — 96372 THER/PROPH/DIAG INJ SC/IM: CPT | Mod: ,,, | Performed by: FAMILY MEDICINE

## 2021-12-13 PROCEDURE — 3008F PR BODY MASS INDEX (BMI) DOCUMENTED: ICD-10-PCS | Mod: CPTII,,, | Performed by: FAMILY MEDICINE

## 2021-12-13 PROCEDURE — 99213 OFFICE O/P EST LOW 20 MIN: CPT | Mod: 25,,, | Performed by: FAMILY MEDICINE

## 2021-12-13 RX ORDER — CEFTRIAXONE 1 G/1
1 INJECTION, POWDER, FOR SOLUTION INTRAMUSCULAR; INTRAVENOUS
Status: COMPLETED | OUTPATIENT
Start: 2021-12-13 | End: 2021-12-13

## 2021-12-13 RX ORDER — METHYLPREDNISOLONE ACETATE 80 MG/ML
40 INJECTION, SUSPENSION INTRA-ARTICULAR; INTRALESIONAL; INTRAMUSCULAR; SOFT TISSUE
Status: COMPLETED | OUTPATIENT
Start: 2021-12-13 | End: 2021-12-13

## 2021-12-13 RX ORDER — CIPROFLOXACIN 250 MG/1
250 TABLET, FILM COATED ORAL EVERY 12 HOURS
Qty: 20 TABLET | Refills: 0 | Status: SHIPPED | OUTPATIENT
Start: 2021-12-13 | End: 2022-08-22

## 2021-12-13 RX ORDER — GUAIFENESIN/DEXTROMETHORPHAN 100-10MG/5
5 SYRUP ORAL EVERY 6 HOURS PRN
Qty: 120 ML | Refills: 0 | Status: SHIPPED | OUTPATIENT
Start: 2021-12-13 | End: 2022-08-22

## 2021-12-13 RX ORDER — DEXAMETHASONE SODIUM PHOSPHATE 4 MG/ML
4 INJECTION, SOLUTION INTRA-ARTICULAR; INTRALESIONAL; INTRAMUSCULAR; INTRAVENOUS; SOFT TISSUE
Status: COMPLETED | OUTPATIENT
Start: 2021-12-13 | End: 2021-12-13

## 2021-12-13 RX ADMIN — CEFTRIAXONE 1 G: 1 INJECTION, POWDER, FOR SOLUTION INTRAMUSCULAR; INTRAVENOUS at 07:12

## 2021-12-13 RX ADMIN — METHYLPREDNISOLONE ACETATE 40 MG: 80 INJECTION, SUSPENSION INTRA-ARTICULAR; INTRALESIONAL; INTRAMUSCULAR; SOFT TISSUE at 07:12

## 2021-12-13 RX ADMIN — DEXAMETHASONE SODIUM PHOSPHATE 4 MG: 4 INJECTION, SOLUTION INTRA-ARTICULAR; INTRALESIONAL; INTRAMUSCULAR; INTRAVENOUS; SOFT TISSUE at 07:12

## 2022-06-29 ENCOUNTER — OFFICE VISIT (OUTPATIENT)
Dept: FAMILY MEDICINE | Facility: CLINIC | Age: 57
End: 2022-06-29
Payer: COMMERCIAL

## 2022-06-29 VITALS
OXYGEN SATURATION: 99 % | TEMPERATURE: 98 F | SYSTOLIC BLOOD PRESSURE: 134 MMHG | HEART RATE: 76 BPM | WEIGHT: 216.38 LBS | HEIGHT: 73 IN | DIASTOLIC BLOOD PRESSURE: 88 MMHG | BODY MASS INDEX: 28.68 KG/M2

## 2022-06-29 DIAGNOSIS — Z20.822 CLOSE EXPOSURE TO COVID-19 VIRUS: Primary | ICD-10-CM

## 2022-06-29 DIAGNOSIS — J01.00 ACUTE NON-RECURRENT MAXILLARY SINUSITIS: ICD-10-CM

## 2022-06-29 DIAGNOSIS — R51.9 NONINTRACTABLE HEADACHE, UNSPECIFIED CHRONICITY PATTERN, UNSPECIFIED HEADACHE TYPE: ICD-10-CM

## 2022-06-29 LAB
CTP QC/QA: YES
SARS-COV-2 AG RESP QL IA.RAPID: NEGATIVE

## 2022-06-29 PROCEDURE — 3008F BODY MASS INDEX DOCD: CPT | Mod: CPTII,,, | Performed by: FAMILY MEDICINE

## 2022-06-29 PROCEDURE — 99213 OFFICE O/P EST LOW 20 MIN: CPT | Mod: 25,,, | Performed by: FAMILY MEDICINE

## 2022-06-29 PROCEDURE — 3079F DIAST BP 80-89 MM HG: CPT | Mod: CPTII,,, | Performed by: FAMILY MEDICINE

## 2022-06-29 PROCEDURE — 3075F SYST BP GE 130 - 139MM HG: CPT | Mod: CPTII,,, | Performed by: FAMILY MEDICINE

## 2022-06-29 PROCEDURE — 96372 PR INJECTION,THERAP/PROPH/DIAG2ST, IM OR SUBCUT: ICD-10-PCS | Mod: ,,, | Performed by: FAMILY MEDICINE

## 2022-06-29 PROCEDURE — 4010F PR ACE/ARB THEARPY RXD/TAKEN: ICD-10-PCS | Mod: CPTII,,, | Performed by: FAMILY MEDICINE

## 2022-06-29 PROCEDURE — 96372 THER/PROPH/DIAG INJ SC/IM: CPT | Mod: ,,, | Performed by: FAMILY MEDICINE

## 2022-06-29 PROCEDURE — 1159F PR MEDICATION LIST DOCUMENTED IN MEDICAL RECORD: ICD-10-PCS | Mod: CPTII,,, | Performed by: FAMILY MEDICINE

## 2022-06-29 PROCEDURE — 3079F PR MOST RECENT DIASTOLIC BLOOD PRESSURE 80-89 MM HG: ICD-10-PCS | Mod: CPTII,,, | Performed by: FAMILY MEDICINE

## 2022-06-29 PROCEDURE — 4010F ACE/ARB THERAPY RXD/TAKEN: CPT | Mod: CPTII,,, | Performed by: FAMILY MEDICINE

## 2022-06-29 PROCEDURE — 3008F PR BODY MASS INDEX (BMI) DOCUMENTED: ICD-10-PCS | Mod: CPTII,,, | Performed by: FAMILY MEDICINE

## 2022-06-29 PROCEDURE — 87426 SARSCOV CORONAVIRUS AG IA: CPT | Mod: QW,,, | Performed by: FAMILY MEDICINE

## 2022-06-29 PROCEDURE — 87426 SARS CORONAVIRUS 2 ANTIGEN POCT: ICD-10-PCS | Mod: QW,,, | Performed by: FAMILY MEDICINE

## 2022-06-29 PROCEDURE — 1159F MED LIST DOCD IN RCRD: CPT | Mod: CPTII,,, | Performed by: FAMILY MEDICINE

## 2022-06-29 PROCEDURE — 3075F PR MOST RECENT SYSTOLIC BLOOD PRESS GE 130-139MM HG: ICD-10-PCS | Mod: CPTII,,, | Performed by: FAMILY MEDICINE

## 2022-06-29 PROCEDURE — 99213 PR OFFICE/OUTPT VISIT, EST, LEVL III, 20-29 MIN: ICD-10-PCS | Mod: 25,,, | Performed by: FAMILY MEDICINE

## 2022-06-29 RX ORDER — METHYLPREDNISOLONE ACETATE 80 MG/ML
40 INJECTION, SUSPENSION INTRA-ARTICULAR; INTRALESIONAL; INTRAMUSCULAR; SOFT TISSUE
Status: COMPLETED | OUTPATIENT
Start: 2022-06-29 | End: 2022-06-29

## 2022-06-29 RX ORDER — AZITHROMYCIN 250 MG/1
TABLET, FILM COATED ORAL
Qty: 6 TABLET | Refills: 0 | Status: SHIPPED | OUTPATIENT
Start: 2022-06-29 | End: 2022-07-04

## 2022-06-29 RX ORDER — CEFTRIAXONE 1 G/1
1 INJECTION, POWDER, FOR SOLUTION INTRAMUSCULAR; INTRAVENOUS
Status: COMPLETED | OUTPATIENT
Start: 2022-06-29 | End: 2022-06-29

## 2022-06-29 RX ORDER — DEXAMETHASONE SODIUM PHOSPHATE 4 MG/ML
4 INJECTION, SOLUTION INTRA-ARTICULAR; INTRALESIONAL; INTRAMUSCULAR; INTRAVENOUS; SOFT TISSUE
Status: COMPLETED | OUTPATIENT
Start: 2022-06-29 | End: 2022-06-29

## 2022-06-29 RX ADMIN — DEXAMETHASONE SODIUM PHOSPHATE 4 MG: 4 INJECTION, SOLUTION INTRA-ARTICULAR; INTRALESIONAL; INTRAMUSCULAR; INTRAVENOUS; SOFT TISSUE at 11:06

## 2022-06-29 RX ADMIN — CEFTRIAXONE 1 G: 1 INJECTION, POWDER, FOR SOLUTION INTRAMUSCULAR; INTRAVENOUS at 11:06

## 2022-06-29 RX ADMIN — METHYLPREDNISOLONE ACETATE 40 MG: 80 INJECTION, SUSPENSION INTRA-ARTICULAR; INTRALESIONAL; INTRAMUSCULAR; SOFT TISSUE at 11:06

## 2022-06-29 NOTE — PROGRESS NOTES
Wilfred Menon MD   [facility]  [address] [place] [address]     PATIENT NAME: Quintin Sandoval  : 1965  DATE: 22  MRN: [id number]      Billing Provider: Wilfred Menon MD  Level of Service: OK OFFICE/OUTPT VISIT, EST, LEVL III, 20-29 MIN  Patient PCP Information     Provider PCP Type    Wilfred Menon MD General          Reason for Visit / Chief Complaint: Sinus Problem (Headache. Sinus drainage. Pressure to back of head and left cheek. Feeling bad. Symptoms started 1 week ago.)         History of Present Illness / Problem Focused Workflow     Quintin Sandoval presents to the clinic with Sinus Problem (Headache. Sinus drainage. Pressure to back of head and left cheek. Feeling bad. Symptoms started 1 week ago.)     HPI    Review of Systems     Review of Systems   Constitutional: Negative for activity change, appetite change, fatigue and fever.   HENT: Positive for nasal congestion, rhinorrhea, sinus pressure/congestion and sore throat. Negative for ear pain and hearing loss.    Respiratory: Positive for cough. Negative for chest tightness and shortness of breath.    Cardiovascular: Negative for chest pain and palpitations.   Gastrointestinal: Negative for abdominal pain and fecal incontinence.   Genitourinary: Negative for bladder incontinence, difficulty urinating and erectile dysfunction.   Musculoskeletal: Negative for arthralgias.   Integumentary:  Negative for rash.   Neurological: Negative for dizziness and headaches.        Medical / Social / Family History     Past Medical History:   Diagnosis Date    Hyperlipidemia        Past Surgical History:   Procedure Laterality Date    ANGIOPLASTY      HERNIA REPAIR      REPAIR OF MENISCUS OF KNEE Right     SINUS SURGERY      x 4       Social History  Quintin Sandoval  reports that he has never smoked. He has quit using smokeless tobacco. He reports current alcohol use. He reports that he does  not use drugs.    Family History  Quintin Sandoval  family history includes Diabetes in his mother; Heart disease in his mother.    Medications and Allergies     Medications  Outpatient Medications Marked as Taking for the 6/29/22 encounter (Office Visit) with Wilfred Menon MD   Medication Sig Dispense Refill    aspirin (ECOTRIN) 81 MG EC tablet Take 81 mg by mouth once daily.      atorvastatin (LIPITOR) 80 MG tablet Take 40 mg by mouth once daily.      b complex vitamins tablet Take 1 tablet by mouth once daily.      carvediloL (COREG) 6.25 MG tablet Take 6.25 mg by mouth 2 (two) times daily.      fexofenadine (ALLEGRA) 180 MG tablet Take 180 mg by mouth once daily.      fluticasone propionate (FLONASE) 50 mcg/actuation nasal spray 2 sprays by Each Nostril route once daily.      lisinopriL (PRINIVIL,ZESTRIL) 5 MG tablet Take 1 tablet by mouth once daily.      montelukast (SINGULAIR) 10 mg tablet TAKE 1 TABLET BY MOUTH EVERY DAY IN THE EVENING 90 tablet 1     Current Facility-Administered Medications for the 6/29/22 encounter (Office Visit) with Wilfred Menon MD   Medication Dose Route Frequency Provider Last Rate Last Admin    cefTRIAXone injection 1 g  1 g Intramuscular 1 time in Clinic/HOD Wilfred Menon MD        dexamethasone injection 4 mg  4 mg Intramuscular 1 time in Clinic/HOD Wilfred Menon MD        methylPREDNISolone acetate injection 40 mg  40 mg Intramuscular 1 time in Clinic/HOD Wilfred Menon MD           Allergies  Review of patient's allergies indicates:  No Known Allergies    Physical Examination     Vitals:    06/29/22 1017   BP: 134/88   Pulse: 76   Temp: 98 °F (36.7 °C)     Physical Exam  Constitutional:       General: He is not in acute distress.     Appearance: He is not ill-appearing.   HENT:      Head: Normocephalic and atraumatic.      Right Ear: Tympanic membrane and ear canal normal.      Left Ear: Tympanic membrane and ear canal normal.       Nose: Congestion and rhinorrhea present.   Eyes:      Pupils: Pupils are equal, round, and reactive to light.   Cardiovascular:      Rate and Rhythm: Normal rate and regular rhythm.      Pulses: Normal pulses.      Heart sounds: No murmur heard.  Pulmonary:      Effort: No respiratory distress.      Breath sounds: No wheezing, rhonchi or rales.   Abdominal:      General: Bowel sounds are normal.      Palpations: Abdomen is soft.      Tenderness: There is no abdominal tenderness.      Hernia: No hernia is present.   Musculoskeletal:      Cervical back: Normal range of motion and neck supple.   Lymphadenopathy:      Cervical: No cervical adenopathy.   Skin:     General: Skin is warm and dry.   Neurological:      Mental Status: He is alert.   Psychiatric:         Behavior: Behavior normal.         Thought Content: Thought content normal.          Assessment and Plan (including Health Maintenance)   :    Plan:         Health Maintenance Due   Topic Date Due    Hepatitis C Screening  Never done    Diabetes Urine Screening  Never done    Foot Exam  Never done    Eye Exam  Never done    HIV Screening  Never done    TETANUS VACCINE  Never done    Colorectal Cancer Screening  Never done    Shingles Vaccine (1 of 2) Never done    COVID-19 Vaccine (4 - Booster for Moderna series) 04/02/2022    Hemoglobin A1c  05/02/2022       Problem List Items Addressed This Visit    None     Visit Diagnoses     Close exposure to COVID-19 virus    -  Primary    Relevant Orders    SARS Coronavirus 2 Antigen, POCT (Completed)    Nonintractable headache, unspecified chronicity pattern, unspecified headache type        Relevant Orders    SARS Coronavirus 2 Antigen, POCT (Completed)    Acute non-recurrent maxillary sinusitis        Relevant Medications    dexamethasone injection 4 mg (Start on 6/29/2022 11:15 AM)    methylPREDNISolone acetate injection 40 mg (Start on 6/29/2022 11:15 AM)    cefTRIAXone injection 1 g (Start on 6/29/2022  11:15 AM)    azithromycin (Z-BRIANNA) 250 MG tablet        Close exposure to COVID-19 virus  -     SARS Coronavirus 2 Antigen, POCT    Nonintractable headache, unspecified chronicity pattern, unspecified headache type  -     SARS Coronavirus 2 Antigen, POCT    Acute non-recurrent maxillary sinusitis  -     dexamethasone injection 4 mg  -     methylPREDNISolone acetate injection 40 mg  -     cefTRIAXone injection 1 g  -     azithromycin (Z-BRIANNA) 250 MG tablet; Take 2 tablets by mouth on day 1; Take 1 tablet by mouth on days 2-5  Dispense: 6 tablet; Refill: 0       Health Maintenance Topics with due status: Not Due       Topic Last Completion Date    Lipid Panel 11/02/2021    Low Dose Statin 06/29/2022    Influenza Vaccine Not Due       Procedures     No future appointments.     No follow-ups on file.       Signature:  Wilfred Menon MD  [facility]  [address] [place] [address]  [phone] Phone  [phone] Fax    Date of encounter: 6/29/22

## 2022-08-22 ENCOUNTER — OFFICE VISIT (OUTPATIENT)
Dept: FAMILY MEDICINE | Facility: CLINIC | Age: 57
End: 2022-08-22
Payer: COMMERCIAL

## 2022-08-22 VITALS
HEART RATE: 75 BPM | DIASTOLIC BLOOD PRESSURE: 74 MMHG | OXYGEN SATURATION: 99 % | WEIGHT: 218.25 LBS | HEIGHT: 73 IN | TEMPERATURE: 98 F | BODY MASS INDEX: 28.93 KG/M2 | SYSTOLIC BLOOD PRESSURE: 110 MMHG

## 2022-08-22 DIAGNOSIS — M25.461 EFFUSION OF BURSA OF RIGHT KNEE: ICD-10-CM

## 2022-08-22 DIAGNOSIS — M25.561 ACUTE PAIN OF RIGHT KNEE: Primary | ICD-10-CM

## 2022-08-22 PROCEDURE — 4010F ACE/ARB THERAPY RXD/TAKEN: CPT | Mod: CPTII,,, | Performed by: FAMILY MEDICINE

## 2022-08-22 PROCEDURE — 3074F SYST BP LT 130 MM HG: CPT | Mod: CPTII,,, | Performed by: FAMILY MEDICINE

## 2022-08-22 PROCEDURE — 3008F PR BODY MASS INDEX (BMI) DOCUMENTED: ICD-10-PCS | Mod: CPTII,,, | Performed by: FAMILY MEDICINE

## 2022-08-22 PROCEDURE — 3078F DIAST BP <80 MM HG: CPT | Mod: CPTII,,, | Performed by: FAMILY MEDICINE

## 2022-08-22 PROCEDURE — 99213 OFFICE O/P EST LOW 20 MIN: CPT | Mod: 25,,, | Performed by: FAMILY MEDICINE

## 2022-08-22 PROCEDURE — 20610 LARGE JOINT ASPIRATION/INJECTION: R KNEE: ICD-10-PCS | Mod: RT,,, | Performed by: FAMILY MEDICINE

## 2022-08-22 PROCEDURE — 1159F PR MEDICATION LIST DOCUMENTED IN MEDICAL RECORD: ICD-10-PCS | Mod: CPTII,,, | Performed by: FAMILY MEDICINE

## 2022-08-22 PROCEDURE — 3074F PR MOST RECENT SYSTOLIC BLOOD PRESSURE < 130 MM HG: ICD-10-PCS | Mod: CPTII,,, | Performed by: FAMILY MEDICINE

## 2022-08-22 PROCEDURE — 3008F BODY MASS INDEX DOCD: CPT | Mod: CPTII,,, | Performed by: FAMILY MEDICINE

## 2022-08-22 PROCEDURE — 3078F PR MOST RECENT DIASTOLIC BLOOD PRESSURE < 80 MM HG: ICD-10-PCS | Mod: CPTII,,, | Performed by: FAMILY MEDICINE

## 2022-08-22 PROCEDURE — 1159F MED LIST DOCD IN RCRD: CPT | Mod: CPTII,,, | Performed by: FAMILY MEDICINE

## 2022-08-22 PROCEDURE — 99213 PR OFFICE/OUTPT VISIT, EST, LEVL III, 20-29 MIN: ICD-10-PCS | Mod: 25,,, | Performed by: FAMILY MEDICINE

## 2022-08-22 PROCEDURE — 4010F PR ACE/ARB THEARPY RXD/TAKEN: ICD-10-PCS | Mod: CPTII,,, | Performed by: FAMILY MEDICINE

## 2022-08-22 PROCEDURE — 20610 DRAIN/INJ JOINT/BURSA W/O US: CPT | Mod: RT,,, | Performed by: FAMILY MEDICINE

## 2022-08-22 RX ORDER — METHYLPREDNISOLONE ACETATE 80 MG/ML
40 INJECTION, SUSPENSION INTRA-ARTICULAR; INTRALESIONAL; INTRAMUSCULAR; SOFT TISSUE
Status: DISCONTINUED | OUTPATIENT
Start: 2022-08-22 | End: 2022-08-22 | Stop reason: HOSPADM

## 2022-08-22 RX ADMIN — METHYLPREDNISOLONE ACETATE 40 MG: 80 INJECTION, SUSPENSION INTRA-ARTICULAR; INTRALESIONAL; INTRAMUSCULAR; SOFT TISSUE at 07:08

## 2022-08-22 NOTE — PROGRESS NOTES
Wilfred Menon MD   [facility]  [address] [place] [address]     PATIENT NAME: Quintin Sandoval  : 1965  DATE: 22  MRN: [id number]      Billing Provider: Wilfred Menon MD  Level of Service:   Patient PCP Information     Provider PCP Type    Wilfred Menon MD General          Reason for Visit / Chief Complaint: Knee Pain (Right knee pain since Saturday morning. History of meniscus repair and plasma injection to right knee last year. States he never got full ROM back in his knee. Denies any injury. States pain radiates down into his calf.)         History of Present Illness / Problem Focused Workflow     Quintin Sandoval presents to the clinic with Knee Pain (Right knee pain since Saturday morning. History of meniscus repair and plasma injection to right knee last year. States he never got full ROM back in his knee. Denies any injury. States pain radiates down into his calf.)     HPI    Review of Systems     Review of Systems   Constitutional: Negative for activity change, appetite change, fatigue and fever.   HENT: Negative for nasal congestion, ear pain, hearing loss, sinus pressure/congestion and sore throat.    Respiratory: Negative for cough, chest tightness and shortness of breath.    Cardiovascular: Negative for chest pain and palpitations.   Gastrointestinal: Negative for abdominal pain and fecal incontinence.   Genitourinary: Negative for bladder incontinence, difficulty urinating and erectile dysfunction.   Musculoskeletal: Positive for joint swelling and leg pain. Negative for arthralgias.   Integumentary:  Negative for rash.   Neurological: Negative for dizziness and headaches.        Medical / Social / Family History     Past Medical History:   Diagnosis Date    Hyperlipidemia        Past Surgical History:   Procedure Laterality Date    ANGIOPLASTY      HERNIA REPAIR      REPAIR OF MENISCUS OF KNEE Right     SINUS SURGERY      x 4        Social History  Quintin Sandoval  reports that he has never smoked. He has quit using smokeless tobacco. He reports current alcohol use. He reports that he does not use drugs.    Family History  Quintin Sandoval  family history includes Diabetes in his mother; Heart disease in his mother.    Medications and Allergies     Medications  Outpatient Medications Marked as Taking for the 8/22/22 encounter (Office Visit) with Wilfred Menon MD   Medication Sig Dispense Refill    aspirin (ECOTRIN) 81 MG EC tablet Take 81 mg by mouth once daily.      atorvastatin (LIPITOR) 80 MG tablet Take 40 mg by mouth once daily.      b complex vitamins tablet Take 1 tablet by mouth once daily.      carvediloL (COREG) 6.25 MG tablet Take 6.25 mg by mouth 2 (two) times daily.      fexofenadine (ALLEGRA) 180 MG tablet Take 180 mg by mouth once daily.      fluticasone propionate (FLONASE) 50 mcg/actuation nasal spray 2 sprays by Each Nostril route once daily.      lisinopriL (PRINIVIL,ZESTRIL) 5 MG tablet Take 1 tablet by mouth once daily.      montelukast (SINGULAIR) 10 mg tablet TAKE 1 TABLET BY MOUTH EVERY DAY IN THE EVENING 90 tablet 1       Allergies  Review of patient's allergies indicates:  No Known Allergies    Physical Examination     Vitals:    08/22/22 0716   BP: 110/74   Pulse: 75   Temp: 98.4 °F (36.9 °C)     Physical Exam  Musculoskeletal:         General: Swelling (effusion right knee) and tenderness present.          Assessment and Plan (including Health Maintenance)   :    Plan:         Health Maintenance Due   Topic Date Due    Hepatitis C Screening  Never done    Diabetes Urine Screening  Never done    Pneumococcal Vaccines (Age 0-64) (1 - PCV) Never done    Foot Exam  Never done    Eye Exam  Never done    HIV Screening  Never done    TETANUS VACCINE  Never done    Colorectal Cancer Screening  Never done    Shingles Vaccine (1 of 2) Never done    COVID-19 Vaccine (4 - Booster for Moderna  series) 04/02/2022    Hemoglobin A1c  05/02/2022       Problem List Items Addressed This Visit    None     Visit Diagnoses     Acute pain of right knee    -  Primary    Relevant Orders    X-Ray Knee AP LAT with [place] Right        Acute pain of right knee  -     X-Ray Knee AP LAT with [place] Right; Future; Expected date: 08/22/2022       Health Maintenance Topics with due status: Not Due       Topic Last Completion Date    Lipid Panel 11/02/2021    Low Dose Statin 08/22/2022    Influenza Vaccine Not Due       Large Joint Aspiration/Injection: R knee    Date/Time: 8/22/2022 7:00 AM  Performed by: Wilfred Menon MD  Authorized by: Wilfred Menon MD     Consent Done?:  Yes (Written)  Indications:  Joint swelling and pain  Site marked: the procedure site was marked    Timeout: prior to procedure the correct patient, procedure, and site was verified    Prep: patient was prepped and draped in usual sterile fashion      Local anesthesia used?: Yes    Anesthesia:  Local infiltration  Local anesthetic:  Bupivacaine 0.25% without epinephrine and topical anesthetic    Details:  Needle Size:  18 G  Ultrasonic Guidance for needle placement?: No    Approach:  Lateral  Location:  Knee  Site:  R knee  Medications:  40 mg methylPREDNISolone acetate 80 mg/mL  Aspirate amount (mL):  34  Aspirate:  Serous  Patient tolerance:  Patient tolerated the procedure well with no immediate complications         No future appointments.     No follow-ups on file.       Signature:  Wilfred Menon MD  [facility]  [address] [place] [address]  [phone] Phone  [phone] Fax    Date of encounter: 8/22/22

## 2022-09-26 ENCOUNTER — OFFICE VISIT (OUTPATIENT)
Dept: FAMILY MEDICINE | Facility: CLINIC | Age: 57
End: 2022-09-26
Payer: COMMERCIAL

## 2022-09-26 VITALS
HEART RATE: 91 BPM | TEMPERATURE: 99 F | SYSTOLIC BLOOD PRESSURE: 120 MMHG | BODY MASS INDEX: 28.76 KG/M2 | OXYGEN SATURATION: 99 % | WEIGHT: 217 LBS | HEIGHT: 73 IN | DIASTOLIC BLOOD PRESSURE: 74 MMHG

## 2022-09-26 DIAGNOSIS — R05.9 COUGH: ICD-10-CM

## 2022-09-26 DIAGNOSIS — J01.00 ACUTE NON-RECURRENT MAXILLARY SINUSITIS: Primary | ICD-10-CM

## 2022-09-26 PROCEDURE — 3074F SYST BP LT 130 MM HG: CPT | Mod: CPTII,,, | Performed by: FAMILY MEDICINE

## 2022-09-26 PROCEDURE — 1159F MED LIST DOCD IN RCRD: CPT | Mod: CPTII,,, | Performed by: FAMILY MEDICINE

## 2022-09-26 PROCEDURE — 3074F PR MOST RECENT SYSTOLIC BLOOD PRESSURE < 130 MM HG: ICD-10-PCS | Mod: CPTII,,, | Performed by: FAMILY MEDICINE

## 2022-09-26 PROCEDURE — 3078F DIAST BP <80 MM HG: CPT | Mod: CPTII,,, | Performed by: FAMILY MEDICINE

## 2022-09-26 PROCEDURE — 96372 PR INJECTION,THERAP/PROPH/DIAG2ST, IM OR SUBCUT: ICD-10-PCS | Mod: ,,, | Performed by: FAMILY MEDICINE

## 2022-09-26 PROCEDURE — 96372 THER/PROPH/DIAG INJ SC/IM: CPT | Mod: ,,, | Performed by: FAMILY MEDICINE

## 2022-09-26 PROCEDURE — 3008F BODY MASS INDEX DOCD: CPT | Mod: CPTII,,, | Performed by: FAMILY MEDICINE

## 2022-09-26 PROCEDURE — 99213 OFFICE O/P EST LOW 20 MIN: CPT | Mod: 25,,, | Performed by: FAMILY MEDICINE

## 2022-09-26 PROCEDURE — 4010F PR ACE/ARB THEARPY RXD/TAKEN: ICD-10-PCS | Mod: CPTII,,, | Performed by: FAMILY MEDICINE

## 2022-09-26 PROCEDURE — 3078F PR MOST RECENT DIASTOLIC BLOOD PRESSURE < 80 MM HG: ICD-10-PCS | Mod: CPTII,,, | Performed by: FAMILY MEDICINE

## 2022-09-26 PROCEDURE — 99213 PR OFFICE/OUTPT VISIT, EST, LEVL III, 20-29 MIN: ICD-10-PCS | Mod: 25,,, | Performed by: FAMILY MEDICINE

## 2022-09-26 PROCEDURE — 4010F ACE/ARB THERAPY RXD/TAKEN: CPT | Mod: CPTII,,, | Performed by: FAMILY MEDICINE

## 2022-09-26 PROCEDURE — 3008F PR BODY MASS INDEX (BMI) DOCUMENTED: ICD-10-PCS | Mod: CPTII,,, | Performed by: FAMILY MEDICINE

## 2022-09-26 PROCEDURE — 1159F PR MEDICATION LIST DOCUMENTED IN MEDICAL RECORD: ICD-10-PCS | Mod: CPTII,,, | Performed by: FAMILY MEDICINE

## 2022-09-26 RX ORDER — AZITHROMYCIN 250 MG/1
TABLET, FILM COATED ORAL
Qty: 6 TABLET | Refills: 0 | Status: SHIPPED | OUTPATIENT
Start: 2022-09-26 | End: 2022-10-01

## 2022-09-26 RX ORDER — DEXAMETHASONE SODIUM PHOSPHATE 4 MG/ML
4 INJECTION, SOLUTION INTRA-ARTICULAR; INTRALESIONAL; INTRAMUSCULAR; INTRAVENOUS; SOFT TISSUE
Status: COMPLETED | OUTPATIENT
Start: 2022-09-26 | End: 2022-09-26

## 2022-09-26 RX ORDER — CEFTRIAXONE 1 G/1
1 INJECTION, POWDER, FOR SOLUTION INTRAMUSCULAR; INTRAVENOUS
Status: COMPLETED | OUTPATIENT
Start: 2022-09-26 | End: 2022-09-26

## 2022-09-26 RX ORDER — METHYLPREDNISOLONE ACETATE 80 MG/ML
40 INJECTION, SUSPENSION INTRA-ARTICULAR; INTRALESIONAL; INTRAMUSCULAR; SOFT TISSUE
Status: COMPLETED | OUTPATIENT
Start: 2022-09-26 | End: 2022-09-26

## 2022-09-26 RX ADMIN — DEXAMETHASONE SODIUM PHOSPHATE 4 MG: 4 INJECTION, SOLUTION INTRA-ARTICULAR; INTRALESIONAL; INTRAMUSCULAR; INTRAVENOUS; SOFT TISSUE at 03:09

## 2022-09-26 RX ADMIN — CEFTRIAXONE 1 G: 1 INJECTION, POWDER, FOR SOLUTION INTRAMUSCULAR; INTRAVENOUS at 03:09

## 2022-09-26 RX ADMIN — METHYLPREDNISOLONE ACETATE 40 MG: 80 INJECTION, SUSPENSION INTRA-ARTICULAR; INTRALESIONAL; INTRAMUSCULAR; SOFT TISSUE at 03:09

## 2022-09-26 NOTE — PROGRESS NOTES
Wilfred Menon MD   [facility]  [address] [place] [address]     PATIENT NAME: Quintin Sandoval  : 1965  DATE: 22  MRN: [id number]      Billing Provider: Wilfred Menon MD  Level of Service: ME OFFICE/OUTPT VISIT, EST, LEVL III, 20-29 MIN  Patient PCP Information       Provider PCP Type    Wilfred Menon MD General            Reason for Visit / Chief Complaint: Sinus Problem (Sinus drainage, cough and headache - worse last night. Denies fever.)         History of Present Illness / Problem Focused Workflow     Quintin Sandoval presents to the clinic with Sinus Problem (Sinus drainage, cough and headache - worse last night. Denies fever.)     HPI    Review of Systems     Review of Systems   Constitutional:  Negative for activity change, appetite change, fatigue and fever.   HENT:  Positive for nasal congestion, rhinorrhea, sinus pressure/congestion and sore throat. Negative for ear pain and hearing loss.    Respiratory:  Positive for cough. Negative for chest tightness and shortness of breath.    Cardiovascular:  Negative for chest pain and palpitations.   Gastrointestinal:  Negative for abdominal pain and fecal incontinence.   Genitourinary:  Negative for bladder incontinence, difficulty urinating and erectile dysfunction.   Musculoskeletal:  Negative for arthralgias.   Integumentary:  Negative for rash.   Neurological:  Negative for dizziness and headaches.      Medical / Social / Family History     Past Medical History:   Diagnosis Date    Hyperlipidemia        Past Surgical History:   Procedure Laterality Date    ANGIOPLASTY      HERNIA REPAIR      REPAIR OF MENISCUS OF KNEE Right     SINUS SURGERY      x 4       Social History  Quintin Sandoval  reports that he has never smoked. He has quit using smokeless tobacco. He reports current alcohol use. He reports that he does not use drugs.    Family History  Quintin Sandoval  family history  includes Diabetes in his mother; Heart disease in his mother.    Medications and Allergies     Medications  Outpatient Medications Marked as Taking for the 9/26/22 encounter (Office Visit) with Wilfred Menon MD   Medication Sig Dispense Refill    aspirin (ECOTRIN) 81 MG EC tablet Take 81 mg by mouth once daily.      atorvastatin (LIPITOR) 80 MG tablet Take 40 mg by mouth once daily.      b complex vitamins tablet Take 1 tablet by mouth once daily.      carvediloL (COREG) 6.25 MG tablet Take 6.25 mg by mouth 2 (two) times daily.      fexofenadine (ALLEGRA) 180 MG tablet Take 180 mg by mouth once daily.      fluticasone propionate (FLONASE) 50 mcg/actuation nasal spray 2 sprays by Each Nostril route once daily.      lisinopriL (PRINIVIL,ZESTRIL) 5 MG tablet Take 1 tablet by mouth once daily.      montelukast (SINGULAIR) 10 mg tablet TAKE 1 TABLET BY MOUTH EVERY DAY IN THE EVENING 90 tablet 1     Current Facility-Administered Medications for the 9/26/22 encounter (Office Visit) with Wilfred Menon MD   Medication Dose Route Frequency Provider Last Rate Last Admin    cefTRIAXone injection 1 g  1 g Intramuscular 1 time in Clinic/HOD Wilfred Menon MD        dexamethasone injection 4 mg  4 mg Intramuscular 1 time in Clinic/HOD Wilfred Menon MD        methylPREDNISolone acetate injection 40 mg  40 mg Intramuscular 1 time in Clinic/HOD Wilfred Menon MD           Allergies  Review of patient's allergies indicates:  No Known Allergies    Physical Examination     Vitals:    09/26/22 1455   BP: 120/74   Pulse: 91   Temp: 98.5 °F (36.9 °C)     Physical Exam  Constitutional:       General: He is not in acute distress.     Appearance: He is not ill-appearing.   HENT:      Head: Normocephalic and atraumatic.      Right Ear: Tympanic membrane and ear canal normal.      Left Ear: Tympanic membrane and ear canal normal.      Nose: Congestion and rhinorrhea present.   Eyes:      Pupils: Pupils are  equal, round, and reactive to light.   Cardiovascular:      Rate and Rhythm: Normal rate and regular rhythm.      Pulses: Normal pulses.      Heart sounds: No murmur heard.  Pulmonary:      Effort: No respiratory distress.      Breath sounds: No wheezing, rhonchi or rales.   Abdominal:      General: Bowel sounds are normal.      Palpations: Abdomen is soft.      Tenderness: There is no abdominal tenderness.      Hernia: No hernia is present.   Musculoskeletal:      Cervical back: Normal range of motion and neck supple.   Lymphadenopathy:      Cervical: No cervical adenopathy.   Skin:     General: Skin is warm and dry.   Neurological:      Mental Status: He is alert.   Psychiatric:         Behavior: Behavior normal.         Thought Content: Thought content normal.        Assessment and Plan (including Health Maintenance)   :    Plan:         Health Maintenance Due   Topic Date Due    Hepatitis C Screening  Never done    Diabetes Urine Screening  Never done    Pneumococcal Vaccines (Age 0-64) (1 - PCV) Never done    Foot Exam  Never done    Eye Exam  Never done    HIV Screening  Never done    TETANUS VACCINE  Never done    Colorectal Cancer Screening  Never done    Shingles Vaccine (1 of 2) Never done    COVID-19 Vaccine (4 - Booster for Moderna series) 01/27/2022    Hemoglobin A1c  05/02/2022    Influenza Vaccine (1) Never done       Problem List Items Addressed This Visit    None  Visit Diagnoses       Acute non-recurrent maxillary sinusitis    -  Primary    Relevant Medications    dexamethasone injection 4 mg (Start on 9/26/2022  3:15 PM)    cefTRIAXone injection 1 g (Start on 9/26/2022  3:15 PM)    methylPREDNISolone acetate injection 40 mg (Start on 9/26/2022  3:15 PM)    azithromycin (Z-BRIANNA) 250 MG tablet    Cough              Acute non-recurrent maxillary sinusitis  -     dexamethasone injection 4 mg  -     cefTRIAXone injection 1 g  -     methylPREDNISolone acetate injection 40 mg  -     azithromycin (Z-BRIANNA)  250 MG tablet; Take 2 tablets by mouth on day 1; Take 1 tablet by mouth on days 2-5  Dispense: 6 tablet; Refill: 0    Cough     Health Maintenance Topics with due status: Not Due       Topic Last Completion Date    Lipid Panel 11/02/2021    Low Dose Statin 08/22/2022       Procedures     Future Appointments   Date Time Provider Department Center   10/5/2022  8:40 AM Wilfred Menon MD [facility] [place]        No follow-ups on file.       Signature:  Wilfred Menon MD  [facility]  [address] [facility] [place] [address]  [phone] Phone  [phone] Fax    Date of encounter: 9/26/22

## 2022-10-05 ENCOUNTER — OFFICE VISIT (OUTPATIENT)
Dept: FAMILY MEDICINE | Facility: CLINIC | Age: 57
End: 2022-10-05
Payer: COMMERCIAL

## 2022-10-05 VITALS
OXYGEN SATURATION: 99 % | HEART RATE: 85 BPM | TEMPERATURE: 98 F | SYSTOLIC BLOOD PRESSURE: 110 MMHG | WEIGHT: 218 LBS | HEIGHT: 73 IN | BODY MASS INDEX: 28.89 KG/M2 | DIASTOLIC BLOOD PRESSURE: 82 MMHG

## 2022-10-05 DIAGNOSIS — Z12.5 SCREENING PSA (PROSTATE SPECIFIC ANTIGEN): ICD-10-CM

## 2022-10-05 DIAGNOSIS — R73.09 HEMOGLOBIN A1C LESS THAN 7.0%: ICD-10-CM

## 2022-10-05 DIAGNOSIS — E78.5 HYPERLIPIDEMIA, UNSPECIFIED HYPERLIPIDEMIA TYPE: ICD-10-CM

## 2022-10-05 DIAGNOSIS — I10 HTN (HYPERTENSION), BENIGN: Primary | ICD-10-CM

## 2022-10-05 DIAGNOSIS — Z00.00 WELL ADULT EXAM: ICD-10-CM

## 2022-10-05 DIAGNOSIS — E11.9 TYPE 2 DIABETES MELLITUS WITHOUT COMPLICATION, WITHOUT LONG-TERM CURRENT USE OF INSULIN: ICD-10-CM

## 2022-10-05 LAB
ALBUMIN SERPL BCP-MCNC: 4.3 G/DL (ref 3.5–5)
ALBUMIN/GLOB SERPL: 1.4 {RATIO}
ALP SERPL-CCNC: 59 U/L (ref 45–115)
ALT SERPL W P-5'-P-CCNC: 49 U/L (ref 16–61)
ANION GAP SERPL CALCULATED.3IONS-SCNC: 10 MMOL/L (ref 7–16)
AST SERPL W P-5'-P-CCNC: 30 U/L (ref 15–37)
BASOPHILS # BLD AUTO: 0.08 K/UL (ref 0–0.2)
BASOPHILS NFR BLD AUTO: 1.3 % (ref 0–1)
BILIRUB SERPL-MCNC: 0.6 MG/DL (ref ?–1.2)
BUN SERPL-MCNC: 21 MG/DL (ref 7–18)
BUN/CREAT SERPL: 24 (ref 6–20)
CALCIUM SERPL-MCNC: 9.1 MG/DL (ref 8.5–10.1)
CHLORIDE SERPL-SCNC: 101 MMOL/L (ref 98–107)
CHOLEST SERPL-MCNC: 200 MG/DL (ref 0–200)
CHOLEST/HDLC SERPL: 3.8 {RATIO}
CO2 SERPL-SCNC: 28 MMOL/L (ref 21–32)
CREAT SERPL-MCNC: 0.89 MG/DL (ref 0.7–1.3)
DIFFERENTIAL METHOD BLD: ABNORMAL
EGFR (NO RACE VARIABLE) (RUSH/TITUS): 101 ML/MIN/1.73M²
EOSINOPHIL # BLD AUTO: 0.85 K/UL (ref 0–0.5)
EOSINOPHIL NFR BLD AUTO: 13.4 % (ref 1–4)
ERYTHROCYTE [DISTWIDTH] IN BLOOD BY AUTOMATED COUNT: 12 % (ref 11.5–14.5)
EST. AVERAGE GLUCOSE BLD GHB EST-MCNC: 120 MG/DL
GLOBULIN SER-MCNC: 3.1 G/DL (ref 2–4)
GLUCOSE SERPL-MCNC: 158 MG/DL (ref 74–106)
HBA1C MFR BLD HPLC: 6.2 % (ref 4.5–6.6)
HCT VFR BLD AUTO: 46 % (ref 40–54)
HDLC SERPL-MCNC: 52 MG/DL (ref 40–60)
HGB BLD-MCNC: 16 G/DL (ref 13.5–18)
IMM GRANULOCYTES # BLD AUTO: 0.11 K/UL (ref 0–0.04)
IMM GRANULOCYTES NFR BLD: 1.7 % (ref 0–0.4)
LDLC SERPL CALC-MCNC: 98 MG/DL
LDLC/HDLC SERPL: 1.9 {RATIO}
LYMPHOCYTES # BLD AUTO: 2.48 K/UL (ref 1–4.8)
LYMPHOCYTES NFR BLD AUTO: 39.1 % (ref 27–41)
MCH RBC QN AUTO: 31.6 PG (ref 27–31)
MCHC RBC AUTO-ENTMCNC: 34.8 G/DL (ref 32–36)
MCV RBC AUTO: 90.9 FL (ref 80–96)
MONOCYTES # BLD AUTO: 0.55 K/UL (ref 0–0.8)
MONOCYTES NFR BLD AUTO: 8.7 % (ref 2–6)
MPC BLD CALC-MCNC: 10.4 FL (ref 9.4–12.4)
NEUTROPHILS # BLD AUTO: 2.27 K/UL (ref 1.8–7.7)
NEUTROPHILS NFR BLD AUTO: 35.8 % (ref 53–65)
NONHDLC SERPL-MCNC: 148 MG/DL
NRBC # BLD AUTO: 0 X10E3/UL
NRBC, AUTO (.00): 0 %
PLATELET # BLD AUTO: 290 K/UL (ref 150–400)
POTASSIUM SERPL-SCNC: 4.3 MMOL/L (ref 3.5–5.1)
PROT SERPL-MCNC: 7.4 G/DL (ref 6.4–8.2)
PSA SERPL-MCNC: 0.94 NG/ML
RBC # BLD AUTO: 5.06 M/UL (ref 4.6–6.2)
SODIUM SERPL-SCNC: 135 MMOL/L (ref 136–145)
TRIGL SERPL-MCNC: 251 MG/DL (ref 35–150)
VLDLC SERPL-MCNC: 50 MG/DL
WBC # BLD AUTO: 6.34 K/UL (ref 4.5–11)

## 2022-10-05 PROCEDURE — 80061 LIPID PANEL: ICD-10-PCS | Mod: ,,, | Performed by: CLINICAL MEDICAL LABORATORY

## 2022-10-05 PROCEDURE — 80061 LIPID PANEL: CPT | Mod: ,,, | Performed by: CLINICAL MEDICAL LABORATORY

## 2022-10-05 PROCEDURE — 85025 CBC WITH DIFFERENTIAL: ICD-10-PCS | Mod: ,,, | Performed by: CLINICAL MEDICAL LABORATORY

## 2022-10-05 PROCEDURE — 85025 COMPLETE CBC W/AUTO DIFF WBC: CPT | Mod: ,,, | Performed by: CLINICAL MEDICAL LABORATORY

## 2022-10-05 PROCEDURE — G0103 PSA, SCREENING: ICD-10-PCS | Mod: ,,, | Performed by: CLINICAL MEDICAL LABORATORY

## 2022-10-05 PROCEDURE — 3079F PR MOST RECENT DIASTOLIC BLOOD PRESSURE 80-89 MM HG: ICD-10-PCS | Mod: CPTII,,, | Performed by: FAMILY MEDICINE

## 2022-10-05 PROCEDURE — 83036 HEMOGLOBIN A1C: ICD-10-PCS | Mod: ,,, | Performed by: CLINICAL MEDICAL LABORATORY

## 2022-10-05 PROCEDURE — G0103 PSA SCREENING: HCPCS | Mod: ,,, | Performed by: CLINICAL MEDICAL LABORATORY

## 2022-10-05 PROCEDURE — 4010F PR ACE/ARB THEARPY RXD/TAKEN: ICD-10-PCS | Mod: CPTII,,, | Performed by: FAMILY MEDICINE

## 2022-10-05 PROCEDURE — 80053 COMPREHEN METABOLIC PANEL: CPT | Mod: ,,, | Performed by: CLINICAL MEDICAL LABORATORY

## 2022-10-05 PROCEDURE — 80053 COMPREHENSIVE METABOLIC PANEL: ICD-10-PCS | Mod: ,,, | Performed by: CLINICAL MEDICAL LABORATORY

## 2022-10-05 PROCEDURE — 3079F DIAST BP 80-89 MM HG: CPT | Mod: CPTII,,, | Performed by: FAMILY MEDICINE

## 2022-10-05 PROCEDURE — 3074F PR MOST RECENT SYSTOLIC BLOOD PRESSURE < 130 MM HG: ICD-10-PCS | Mod: CPTII,,, | Performed by: FAMILY MEDICINE

## 2022-10-05 PROCEDURE — 3074F SYST BP LT 130 MM HG: CPT | Mod: CPTII,,, | Performed by: FAMILY MEDICINE

## 2022-10-05 PROCEDURE — 83036 HEMOGLOBIN GLYCOSYLATED A1C: CPT | Mod: ,,, | Performed by: CLINICAL MEDICAL LABORATORY

## 2022-10-05 PROCEDURE — 1159F PR MEDICATION LIST DOCUMENTED IN MEDICAL RECORD: ICD-10-PCS | Mod: CPTII,,, | Performed by: FAMILY MEDICINE

## 2022-10-05 PROCEDURE — 99396 PR PREVENTIVE VISIT,EST,40-64: ICD-10-PCS | Mod: ,,, | Performed by: FAMILY MEDICINE

## 2022-10-05 PROCEDURE — 99396 PREV VISIT EST AGE 40-64: CPT | Mod: ,,, | Performed by: FAMILY MEDICINE

## 2022-10-05 PROCEDURE — 3008F PR BODY MASS INDEX (BMI) DOCUMENTED: ICD-10-PCS | Mod: CPTII,,, | Performed by: FAMILY MEDICINE

## 2022-10-05 PROCEDURE — 1159F MED LIST DOCD IN RCRD: CPT | Mod: CPTII,,, | Performed by: FAMILY MEDICINE

## 2022-10-05 PROCEDURE — 4010F ACE/ARB THERAPY RXD/TAKEN: CPT | Mod: CPTII,,, | Performed by: FAMILY MEDICINE

## 2022-10-05 PROCEDURE — 3008F BODY MASS INDEX DOCD: CPT | Mod: CPTII,,, | Performed by: FAMILY MEDICINE

## 2022-10-05 RX ORDER — MONTELUKAST SODIUM 10 MG/1
10 TABLET ORAL NIGHTLY
Qty: 90 TABLET | Refills: 1 | Status: SHIPPED | OUTPATIENT
Start: 2022-10-05 | End: 2023-05-01 | Stop reason: SDUPTHER

## 2022-10-05 NOTE — PROGRESS NOTES
Wilfred Menon MD   [facility]  [address] [place] [address]     PATIENT NAME: Quintin Sandoval  : 1965  DATE: 10/5/22  MRN: [id number]      Billing Provider: Wilfred Menon MD  Level of Service: [place] OFFICE/OUTPT VISIT, EST, LEVL IV, 30-39 MIN  Patient PCP Information       Provider PCP Type    Wilfred Menon MD General            Reason for Visit / Chief Complaint: Annual Exam (Wellness Visit. FMLA papers. ), Hypertension, and Medication Refill (Refill on singulair. )         History of Present Illness / Problem Focused Workflow     Quintin Sandoval presents to the clinic with Annual Exam (Wellness Visit. FMLA papers. ), Hypertension, and Medication Refill (Refill on singulair. )     HPI    Review of Systems     Review of Systems   Constitutional:  Negative for activity change, appetite change, fatigue and fever.   HENT:  Positive for nasal congestion, rhinorrhea, sinus pressure/congestion and sore throat. Negative for ear pain and hearing loss.    Respiratory:  Positive for cough. Negative for chest tightness and shortness of breath.    Cardiovascular:  Negative for chest pain and palpitations.   Gastrointestinal:  Negative for abdominal pain and fecal incontinence.   Genitourinary:  Negative for bladder incontinence, difficulty urinating and erectile dysfunction.   Musculoskeletal:  Negative for arthralgias.   Integumentary:  Negative for rash.   Neurological:  Negative for dizziness and headaches.      Medical / Social / Family History     Past Medical History:   Diagnosis Date    Hyperlipidemia        Past Surgical History:   Procedure Laterality Date    ANGIOPLASTY      HERNIA REPAIR      REPAIR OF MENISCUS OF KNEE Right     SINUS SURGERY      x 4       Social History  Quintin Sandoval  reports that he has never smoked. He has quit using smokeless tobacco. He reports current alcohol use. He reports that he does not use drugs.    Family  History  Quintin Sandoval  family history includes Diabetes in his mother; Heart disease in his mother.    Medications and Allergies     Medications  Outpatient Medications Marked as Taking for the 10/5/22 encounter (Office Visit) with Wilfred Menon MD   Medication Sig Dispense Refill    aspirin (ECOTRIN) 81 MG EC tablet Take 81 mg by mouth once daily.      atorvastatin (LIPITOR) 80 MG tablet Take 40 mg by mouth once daily.      b complex vitamins tablet Take 1 tablet by mouth once daily.      carvediloL (COREG) 6.25 MG tablet Take 6.25 mg by mouth 2 (two) times daily.      fexofenadine (ALLEGRA) 180 MG tablet Take 180 mg by mouth once daily.      fluticasone propionate (FLONASE) 50 mcg/actuation nasal spray 2 sprays by Each Nostril route once daily.      lisinopriL (PRINIVIL,ZESTRIL) 5 MG tablet Take 1 tablet by mouth once daily.      [DISCONTINUED] montelukast (SINGULAIR) 10 mg tablet TAKE 1 TABLET BY MOUTH EVERY DAY IN THE EVENING 90 tablet 1       Allergies  Review of patient's allergies indicates:  No Known Allergies    Physical Examination     Vitals:    10/05/22 0737   BP: 110/82   Pulse: 85   Temp: 97.8 °F (36.6 °C)     Physical Exam  Constitutional:       General: He is not in acute distress.     Appearance: He is not ill-appearing.   HENT:      Head: Normocephalic and atraumatic.      Right Ear: Tympanic membrane and ear canal normal.      Left Ear: Tympanic membrane and ear canal normal.      Nose: Nose normal. No congestion or rhinorrhea.   Eyes:      Pupils: Pupils are equal, round, and reactive to light.   Cardiovascular:      Rate and Rhythm: Normal rate and regular rhythm.      Pulses: Normal pulses.      Heart sounds: No murmur heard.  Pulmonary:      Effort: No respiratory distress.      Breath sounds: No wheezing, rhonchi or rales.   Abdominal:      General: Bowel sounds are normal.      Palpations: Abdomen is soft.      Tenderness: There is no abdominal tenderness.      Hernia:  No hernia is present.   Musculoskeletal:      Cervical back: Normal range of motion and neck supple.   Lymphadenopathy:      Cervical: No cervical adenopathy.   Skin:     General: Skin is warm and dry.   Neurological:      Mental Status: He is alert.   Psychiatric:         Behavior: Behavior normal.         Thought Content: Thought content normal.        Assessment and Plan (including Health Maintenance)   :    Plan:         Health Maintenance Due   Topic Date Due    Hepatitis C Screening  Never done    Diabetes Urine Screening  Never done    Pneumococcal Vaccines (Age 0-64) (1 - PCV) Never done    Foot Exam  Never done    Eye Exam  Never done    HIV Screening  Never done    TETANUS VACCINE  Never done    Colorectal Cancer Screening  Never done    Shingles Vaccine (1 of 2) Never done    COVID-19 Vaccine (4 - Booster for Moderna series) 01/27/2022    Hemoglobin A1c  05/02/2022    Influenza Vaccine (1) Never done       Problem List Items Addressed This Visit    None  Visit Diagnoses       HTN (hypertension), benign    -  Primary    Relevant Orders    CBC Auto Differential    Comprehensive Metabolic Panel    Hyperlipidemia, unspecified hyperlipidemia type        Relevant Orders    CBC Auto Differential    Comprehensive Metabolic Panel    Well adult exam        Relevant Orders    Lipid Panel    Type 2 diabetes mellitus without complication, without long-term current use of insulin        Relevant Orders    Hemoglobin A1C    Hemoglobin A1c less than 7.0%        Relevant Orders    Hemoglobin A1C    Screening PSA (prostate specific antigen)        Relevant Orders    PSA, Screening          HTN (hypertension), benign  -     CBC Auto Differential; Future; Expected date: 10/05/2022  -     Comprehensive Metabolic Panel; Future; Expected date: 10/05/2022    Hyperlipidemia, unspecified hyperlipidemia type  -     CBC Auto Differential; Future; Expected date: 10/05/2022  -     Comprehensive Metabolic Panel; Future;  Expected date: 10/05/2022    Well adult exam  -     Lipid Panel; Future; Expected date: 10/05/2022    Type 2 diabetes mellitus without complication, without long-term current use of insulin  -     Hemoglobin A1C; Future; Expected date: 10/05/2022    Hemoglobin A1c less than 7.0%  -     Hemoglobin A1C; Future; Expected date: 10/05/2022    Screening PSA (prostate specific antigen)  -     PSA, Screening; Future; Expected date: 10/05/2022    Other orders  -     montelukast (SINGULAIR) 10 mg tablet; Take 1 tablet (10 mg total) by mouth every evening.  Dispense: 90 tablet; Refill: 1       Health Maintenance Topics with due status: Not Due       Topic Last Completion Date    Lipid Panel 11/02/2021    Low Dose Statin 10/05/2022       Procedures     Future Appointments   Date Time Provider Department Center   10/5/2022  8:40 AM Wilfred Menon MD [facility] [place]        No follow-ups on file.       Signature:  Wilfred Menon MD  [facility]  [address] [facility]   [place] [address]  [phone] Phone  [phone] Fax    Date of encounter: 10/5/22

## 2022-11-03 ENCOUNTER — OFFICE VISIT (OUTPATIENT)
Dept: FAMILY MEDICINE | Facility: CLINIC | Age: 57
End: 2022-11-03
Payer: COMMERCIAL

## 2022-11-03 VITALS
DIASTOLIC BLOOD PRESSURE: 84 MMHG | OXYGEN SATURATION: 97 % | HEART RATE: 94 BPM | SYSTOLIC BLOOD PRESSURE: 128 MMHG | TEMPERATURE: 98 F | WEIGHT: 219.38 LBS | HEIGHT: 73 IN | BODY MASS INDEX: 29.08 KG/M2

## 2022-11-03 DIAGNOSIS — R19.7 DIARRHEA, UNSPECIFIED TYPE: ICD-10-CM

## 2022-11-03 DIAGNOSIS — R51.9 INTRACTABLE HEADACHE, UNSPECIFIED CHRONICITY PATTERN, UNSPECIFIED HEADACHE TYPE: ICD-10-CM

## 2022-11-03 DIAGNOSIS — J01.00 ACUTE NON-RECURRENT MAXILLARY SINUSITIS: ICD-10-CM

## 2022-11-03 DIAGNOSIS — R52 BODY ACHES: ICD-10-CM

## 2022-11-03 DIAGNOSIS — R11.0 NAUSEA: Primary | ICD-10-CM

## 2022-11-03 LAB
CTP QC/QA: YES
FLUAV AG NPH QL: NEGATIVE
FLUBV AG NPH QL: NEGATIVE

## 2022-11-03 PROCEDURE — 96372 PR INJECTION,THERAP/PROPH/DIAG2ST, IM OR SUBCUT: ICD-10-PCS | Mod: ,,, | Performed by: FAMILY MEDICINE

## 2022-11-03 PROCEDURE — 1159F PR MEDICATION LIST DOCUMENTED IN MEDICAL RECORD: ICD-10-PCS | Mod: CPTII,,, | Performed by: FAMILY MEDICINE

## 2022-11-03 PROCEDURE — 3074F SYST BP LT 130 MM HG: CPT | Mod: CPTII,,, | Performed by: FAMILY MEDICINE

## 2022-11-03 PROCEDURE — 99213 OFFICE O/P EST LOW 20 MIN: CPT | Mod: 25,,, | Performed by: FAMILY MEDICINE

## 2022-11-03 PROCEDURE — 3008F PR BODY MASS INDEX (BMI) DOCUMENTED: ICD-10-PCS | Mod: CPTII,,, | Performed by: FAMILY MEDICINE

## 2022-11-03 PROCEDURE — 3079F DIAST BP 80-89 MM HG: CPT | Mod: CPTII,,, | Performed by: FAMILY MEDICINE

## 2022-11-03 PROCEDURE — 1159F MED LIST DOCD IN RCRD: CPT | Mod: CPTII,,, | Performed by: FAMILY MEDICINE

## 2022-11-03 PROCEDURE — 4010F PR ACE/ARB THEARPY RXD/TAKEN: ICD-10-PCS | Mod: CPTII,,, | Performed by: FAMILY MEDICINE

## 2022-11-03 PROCEDURE — 3079F PR MOST RECENT DIASTOLIC BLOOD PRESSURE 80-89 MM HG: ICD-10-PCS | Mod: CPTII,,, | Performed by: FAMILY MEDICINE

## 2022-11-03 PROCEDURE — 3008F BODY MASS INDEX DOCD: CPT | Mod: CPTII,,, | Performed by: FAMILY MEDICINE

## 2022-11-03 PROCEDURE — 87804 POCT INFLUENZA A/B: ICD-10-PCS | Mod: 59,QW,, | Performed by: FAMILY MEDICINE

## 2022-11-03 PROCEDURE — 4010F ACE/ARB THERAPY RXD/TAKEN: CPT | Mod: CPTII,,, | Performed by: FAMILY MEDICINE

## 2022-11-03 PROCEDURE — 3074F PR MOST RECENT SYSTOLIC BLOOD PRESSURE < 130 MM HG: ICD-10-PCS | Mod: CPTII,,, | Performed by: FAMILY MEDICINE

## 2022-11-03 PROCEDURE — 3044F PR MOST RECENT HEMOGLOBIN A1C LEVEL <7.0%: ICD-10-PCS | Mod: CPTII,,, | Performed by: FAMILY MEDICINE

## 2022-11-03 PROCEDURE — 3044F HG A1C LEVEL LT 7.0%: CPT | Mod: CPTII,,, | Performed by: FAMILY MEDICINE

## 2022-11-03 PROCEDURE — 87804 INFLUENZA ASSAY W/OPTIC: CPT | Mod: QW,,, | Performed by: FAMILY MEDICINE

## 2022-11-03 PROCEDURE — 96372 THER/PROPH/DIAG INJ SC/IM: CPT | Mod: ,,, | Performed by: FAMILY MEDICINE

## 2022-11-03 PROCEDURE — 99213 PR OFFICE/OUTPT VISIT, EST, LEVL III, 20-29 MIN: ICD-10-PCS | Mod: 25,,, | Performed by: FAMILY MEDICINE

## 2022-11-03 RX ORDER — DEXAMETHASONE SODIUM PHOSPHATE 4 MG/ML
4 INJECTION, SOLUTION INTRA-ARTICULAR; INTRALESIONAL; INTRAMUSCULAR; INTRAVENOUS; SOFT TISSUE
Status: COMPLETED | OUTPATIENT
Start: 2022-11-03 | End: 2022-11-03

## 2022-11-03 RX ORDER — CEFTRIAXONE 1 G/1
1 INJECTION, POWDER, FOR SOLUTION INTRAMUSCULAR; INTRAVENOUS
Status: COMPLETED | OUTPATIENT
Start: 2022-11-03 | End: 2022-11-03

## 2022-11-03 RX ORDER — METHYLPREDNISOLONE ACETATE 80 MG/ML
40 INJECTION, SUSPENSION INTRA-ARTICULAR; INTRALESIONAL; INTRAMUSCULAR; SOFT TISSUE
Status: COMPLETED | OUTPATIENT
Start: 2022-11-03 | End: 2022-11-03

## 2022-11-03 RX ORDER — AZITHROMYCIN 250 MG/1
TABLET, FILM COATED ORAL
Qty: 6 TABLET | Refills: 0 | Status: SHIPPED | OUTPATIENT
Start: 2022-11-03 | End: 2022-11-08

## 2022-11-03 RX ADMIN — DEXAMETHASONE SODIUM PHOSPHATE 4 MG: 4 INJECTION, SOLUTION INTRA-ARTICULAR; INTRALESIONAL; INTRAMUSCULAR; INTRAVENOUS; SOFT TISSUE at 01:11

## 2022-11-03 RX ADMIN — METHYLPREDNISOLONE ACETATE 40 MG: 80 INJECTION, SUSPENSION INTRA-ARTICULAR; INTRALESIONAL; INTRAMUSCULAR; SOFT TISSUE at 01:11

## 2022-11-03 RX ADMIN — CEFTRIAXONE 1 G: 1 INJECTION, POWDER, FOR SOLUTION INTRAMUSCULAR; INTRAVENOUS at 01:11

## 2022-11-03 NOTE — PROGRESS NOTES
Wilfred Menon MD   [facility]  [address] [place] [address]     PATIENT NAME: Quintin Sandoval  : 1965  DATE: 11/3/22  MRN: [id number]      Billing Provider: Wilfred Menon MD  Level of Service:   Patient PCP Information       Provider PCP Type    Wilfred Menon MD General            Reason for Visit / Chief Complaint: Sinus Problem (Sinus drainage x 1 week. Nausea and vomiting x 1 this morning. Diarrhea. Chills. Headache. )         History of Present Illness / Problem Focused Workflow     Quintin Sandoval presents to the clinic with Sinus Problem (Sinus drainage x 1 week. Nausea and vomiting x 1 this morning. Diarrhea. Chills. Headache. )     HPI    Review of Systems     Review of Systems   Constitutional:  Negative for activity change, appetite change, fatigue and fever.   HENT:  Negative for nasal congestion, ear pain, hearing loss, sinus pressure/congestion and sore throat.    Respiratory:  Negative for cough, chest tightness and shortness of breath.    Cardiovascular:  Negative for chest pain and palpitations.   Gastrointestinal:  Negative for abdominal pain and fecal incontinence.   Genitourinary:  Negative for bladder incontinence, difficulty urinating and erectile dysfunction.   Musculoskeletal:  Negative for arthralgias.   Integumentary:  Negative for rash.   Neurological:  Negative for dizziness and headaches.      Medical / Social / Family History     Past Medical History:   Diagnosis Date    Hyperlipidemia        Past Surgical History:   Procedure Laterality Date    ANGIOPLASTY      HERNIA REPAIR      REPAIR OF MENISCUS OF KNEE Right     SINUS SURGERY      x 4       Social History  Quintin Sandoval  reports that he has never smoked. He has quit using smokeless tobacco. He reports current alcohol use. He reports that he does not use drugs.    Family History  uQintin Sandoval  family history includes Diabetes in his mother; Heart disease in  his mother.    Medications and Allergies     Medications  Outpatient Medications Marked as Taking for the 11/3/22 encounter (Office Visit) with Wilfred Menon MD   Medication Sig Dispense Refill    aspirin (ECOTRIN) 81 MG EC tablet Take 81 mg by mouth once daily.      atorvastatin (LIPITOR) 80 MG tablet Take 40 mg by mouth once daily.      b complex vitamins tablet Take 1 tablet by mouth once daily.      carvediloL (COREG) 6.25 MG tablet Take 6.25 mg by mouth 2 (two) times daily.      fexofenadine (ALLEGRA) 180 MG tablet Take 180 mg by mouth once daily.      fluticasone propionate (FLONASE) 50 mcg/actuation nasal spray 2 sprays by Each Nostril route once daily.      lisinopriL (PRINIVIL,ZESTRIL) 5 MG tablet Take 1 tablet by mouth once daily.      montelukast (SINGULAIR) 10 mg tablet Take 1 tablet (10 mg total) by mouth every evening. 90 tablet 1       Allergies  Review of patient's allergies indicates:  No Known Allergies    Physical Examination     Vitals:    11/03/22 1312   BP: 128/84   Pulse: 94   Temp: 98.3 °F (36.8 °C)     Physical Exam  Constitutional:       General: He is not in acute distress.     Appearance: He is not ill-appearing.   HENT:      Head: Normocephalic and atraumatic.      Right Ear: Tympanic membrane and ear canal normal.      Left Ear: Tympanic membrane and ear canal normal.      Nose: Congestion and rhinorrhea present.   Eyes:      Pupils: Pupils are equal, round, and reactive to light.   Cardiovascular:      Rate and Rhythm: Normal rate and regular rhythm.      Pulses: Normal pulses.      Heart sounds: No murmur heard.  Pulmonary:      Effort: No respiratory distress.      Breath sounds: No wheezing, rhonchi or rales.   Abdominal:      General: Bowel sounds are normal.      Palpations: Abdomen is soft.      Tenderness: There is no abdominal tenderness.      Hernia: No hernia is present.   Musculoskeletal:      Cervical back: Normal range of motion and neck supple.   Lymphadenopathy:       Cervical: No cervical adenopathy.   Skin:     General: Skin is warm and dry.   Neurological:      Mental Status: He is alert.   Psychiatric:         Behavior: Behavior normal.         Thought Content: Thought content normal.        Assessment and Plan (including Health Maintenance)   :    Plan:         Health Maintenance Due   Topic Date Due    Hepatitis C Screening  Never done    HIV Screening  Never done    TETANUS VACCINE  Never done    Colorectal Cancer Screening  Never done    Shingles Vaccine (1 of 2) Never done    COVID-19 Vaccine (4 - Booster for Moderna series) 01/27/2022       Problem List Items Addressed This Visit    None  Visit Diagnoses       Nausea    -  Primary    Diarrhea, unspecified type        Intractable headache, unspecified chronicity pattern, unspecified headache type        Body aches              Nausea  -     POCT Influenza A/B    Diarrhea, unspecified type  -     POCT Influenza A/B    Intractable headache, unspecified chronicity pattern, unspecified headache type  -     POCT Influenza A/B    Body aches  -     POCT Influenza A/B       Health Maintenance Topics with due status: Not Due       Topic Last Completion Date    Lipid Panel 10/05/2022       Procedures     No future appointments.     No follow-ups on file.       Signature:  Wilfred Menon MD  [facility]  [address] [place] [address]  [phone] Phone  [phone] Fax    Date of encounter: 11/3/22

## 2022-12-05 ENCOUNTER — OFFICE VISIT (OUTPATIENT)
Dept: FAMILY MEDICINE | Facility: CLINIC | Age: 57
End: 2022-12-05
Payer: COMMERCIAL

## 2022-12-05 VITALS
WEIGHT: 218 LBS | BODY MASS INDEX: 28.89 KG/M2 | HEART RATE: 98 BPM | HEIGHT: 73 IN | DIASTOLIC BLOOD PRESSURE: 82 MMHG | TEMPERATURE: 98 F | OXYGEN SATURATION: 97 % | SYSTOLIC BLOOD PRESSURE: 114 MMHG

## 2022-12-05 DIAGNOSIS — J01.01 ACUTE RECURRENT MAXILLARY SINUSITIS: Primary | ICD-10-CM

## 2022-12-05 PROCEDURE — 99213 PR OFFICE/OUTPT VISIT, EST, LEVL III, 20-29 MIN: ICD-10-PCS | Mod: 25,,, | Performed by: FAMILY MEDICINE

## 2022-12-05 PROCEDURE — 4010F PR ACE/ARB THEARPY RXD/TAKEN: ICD-10-PCS | Mod: CPTII,,, | Performed by: FAMILY MEDICINE

## 2022-12-05 PROCEDURE — 3008F PR BODY MASS INDEX (BMI) DOCUMENTED: ICD-10-PCS | Mod: CPTII,,, | Performed by: FAMILY MEDICINE

## 2022-12-05 PROCEDURE — 1159F MED LIST DOCD IN RCRD: CPT | Mod: CPTII,,, | Performed by: FAMILY MEDICINE

## 2022-12-05 PROCEDURE — 4010F ACE/ARB THERAPY RXD/TAKEN: CPT | Mod: CPTII,,, | Performed by: FAMILY MEDICINE

## 2022-12-05 PROCEDURE — 3044F PR MOST RECENT HEMOGLOBIN A1C LEVEL <7.0%: ICD-10-PCS | Mod: CPTII,,, | Performed by: FAMILY MEDICINE

## 2022-12-05 PROCEDURE — 96372 PR INJECTION,THERAP/PROPH/DIAG2ST, IM OR SUBCUT: ICD-10-PCS | Mod: ,,, | Performed by: FAMILY MEDICINE

## 2022-12-05 PROCEDURE — 3074F PR MOST RECENT SYSTOLIC BLOOD PRESSURE < 130 MM HG: ICD-10-PCS | Mod: CPTII,,, | Performed by: FAMILY MEDICINE

## 2022-12-05 PROCEDURE — 3044F HG A1C LEVEL LT 7.0%: CPT | Mod: CPTII,,, | Performed by: FAMILY MEDICINE

## 2022-12-05 PROCEDURE — 3079F DIAST BP 80-89 MM HG: CPT | Mod: CPTII,,, | Performed by: FAMILY MEDICINE

## 2022-12-05 PROCEDURE — 3074F SYST BP LT 130 MM HG: CPT | Mod: CPTII,,, | Performed by: FAMILY MEDICINE

## 2022-12-05 PROCEDURE — 99213 OFFICE O/P EST LOW 20 MIN: CPT | Mod: 25,,, | Performed by: FAMILY MEDICINE

## 2022-12-05 PROCEDURE — 96372 THER/PROPH/DIAG INJ SC/IM: CPT | Mod: ,,, | Performed by: FAMILY MEDICINE

## 2022-12-05 PROCEDURE — 3079F PR MOST RECENT DIASTOLIC BLOOD PRESSURE 80-89 MM HG: ICD-10-PCS | Mod: CPTII,,, | Performed by: FAMILY MEDICINE

## 2022-12-05 PROCEDURE — 3008F BODY MASS INDEX DOCD: CPT | Mod: CPTII,,, | Performed by: FAMILY MEDICINE

## 2022-12-05 PROCEDURE — 1159F PR MEDICATION LIST DOCUMENTED IN MEDICAL RECORD: ICD-10-PCS | Mod: CPTII,,, | Performed by: FAMILY MEDICINE

## 2022-12-05 RX ORDER — METHYLPREDNISOLONE ACETATE 80 MG/ML
40 INJECTION, SUSPENSION INTRA-ARTICULAR; INTRALESIONAL; INTRAMUSCULAR; SOFT TISSUE
Status: COMPLETED | OUTPATIENT
Start: 2022-12-05 | End: 2022-12-05

## 2022-12-05 RX ORDER — DEXAMETHASONE SODIUM PHOSPHATE 4 MG/ML
4 INJECTION, SOLUTION INTRA-ARTICULAR; INTRALESIONAL; INTRAMUSCULAR; INTRAVENOUS; SOFT TISSUE
Status: COMPLETED | OUTPATIENT
Start: 2022-12-05 | End: 2022-12-05

## 2022-12-05 RX ORDER — BROMPHENIRAMINE MALEATE, PSEUDOEPHEDRINE HYDROCHLORIDE, AND DEXTROMETHORPHAN HYDROBROMIDE 2; 30; 10 MG/5ML; MG/5ML; MG/5ML
10 SYRUP ORAL EVERY 4 HOURS PRN
Qty: 240 ML | Refills: 0 | Status: SHIPPED | OUTPATIENT
Start: 2022-12-05 | End: 2022-12-15

## 2022-12-05 RX ORDER — AZITHROMYCIN 250 MG/1
TABLET, FILM COATED ORAL
Qty: 6 TABLET | Refills: 0 | Status: SHIPPED | OUTPATIENT
Start: 2022-12-05 | End: 2022-12-15 | Stop reason: ALTCHOICE

## 2022-12-05 RX ORDER — LISINOPRIL 2.5 MG/1
2.5 TABLET ORAL DAILY
COMMUNITY
Start: 2022-11-11

## 2022-12-05 RX ORDER — CEFTRIAXONE 1 G/1
1 INJECTION, POWDER, FOR SOLUTION INTRAMUSCULAR; INTRAVENOUS
Status: COMPLETED | OUTPATIENT
Start: 2022-12-05 | End: 2022-12-05

## 2022-12-05 RX ADMIN — DEXAMETHASONE SODIUM PHOSPHATE 4 MG: 4 INJECTION, SOLUTION INTRA-ARTICULAR; INTRALESIONAL; INTRAMUSCULAR; INTRAVENOUS; SOFT TISSUE at 08:12

## 2022-12-05 RX ADMIN — METHYLPREDNISOLONE ACETATE 40 MG: 80 INJECTION, SUSPENSION INTRA-ARTICULAR; INTRALESIONAL; INTRAMUSCULAR; SOFT TISSUE at 08:12

## 2022-12-05 RX ADMIN — CEFTRIAXONE 1 G: 1 INJECTION, POWDER, FOR SOLUTION INTRAMUSCULAR; INTRAVENOUS at 08:12

## 2022-12-05 NOTE — PROGRESS NOTES
Wilfred Menon MD   [facility]  [address] [place] [address]     PATIENT NAME: Quintin Sandoval  : 1965  DATE: 22  MRN: [id number]      Billing Provider: Wilfred Menon MD  Level of Service: [place] OFFICE/OUTPT VISIT, EST, LEVL III, 20-29 MIN  Patient PCP Information       Provider PCP Type    Wilfred Menon MD General            Reason for Visit / Chief Complaint: Sinusitis (Headache, Sore throat, chills, sweats, coughing, body aches, ear pain off and on and PN drainage that started on Saturday. Home Covid Test on Saturday-negative. Felt some better yesterday, but feeling worse this morning. )         History of Present Illness / Problem Focused Workflow     Quintin Sandoval presents to the clinic with Sinusitis (Headache, Sore throat, chills, sweats, coughing, body aches, ear pain off and on and PN drainage that started on Saturday. Home Covid Test on Saturday-negative. Felt some better yesterday, but feeling worse this morning. )     HPI    Review of Systems     Review of Systems   Constitutional:  Positive for fatigue and fever. Negative for activity change and appetite change.   HENT:  Positive for nasal congestion and sinus pressure/congestion. Negative for ear pain, hearing loss and sore throat.    Respiratory:  Positive for cough and chest tightness. Negative for shortness of breath.    Cardiovascular:  Negative for chest pain and palpitations.   Gastrointestinal:  Negative for abdominal pain and fecal incontinence.   Genitourinary:  Negative for bladder incontinence, difficulty urinating and erectile dysfunction.   Musculoskeletal:  Negative for arthralgias.   Integumentary:  Negative for rash.   Neurological:  Negative for dizziness and headaches.      Medical / Social / Family History     Past Medical History:   Diagnosis Date    Hyperlipidemia        Past Surgical History:   Procedure Laterality Date    ANGIOPLASTY      HERNIA REPAIR       REPAIR OF MENISCUS OF KNEE Right     SINUS SURGERY      x 4       Social History  Quintin Sandoval  reports that he has never smoked. He has quit using smokeless tobacco. He reports current alcohol use. He reports that he does not use drugs.    Family History  Quintin Sandoval  family history includes Diabetes in his mother; Heart disease in his mother.    Medications and Allergies     Medications  Outpatient Medications Marked as Taking for the 12/5/22 encounter (Office Visit) with Wilfred Menon MD   Medication Sig Dispense Refill    aspirin (ECOTRIN) 81 MG EC tablet Take 81 mg by mouth once daily.      atorvastatin (LIPITOR) 80 MG tablet Take 40 mg by mouth once daily.      b complex vitamins tablet Take 1 tablet by mouth once daily.      carvediloL (COREG) 6.25 MG tablet Take 6.25 mg by mouth 2 (two) times daily.      fexofenadine (ALLEGRA) 180 MG tablet Take 180 mg by mouth once daily.      fluticasone propionate (FLONASE) 50 mcg/actuation nasal spray 2 sprays by Each Nostril route once daily.      lisinopriL (PRINIVIL,ZESTRIL) 2.5 MG tablet Take 2.5 mg by mouth once daily.      montelukast (SINGULAIR) 10 mg tablet Take 1 tablet (10 mg total) by mouth every evening. 90 tablet 1     Current Facility-Administered Medications for the 12/5/22 encounter (Office Visit) with Wilfred Menon MD   Medication Dose Route Frequency Provider Last Rate Last Admin    cefTRIAXone injection 1 g  1 g Intramuscular 1 time in Clinic/HOD Wilfred Menon MD        dexAMETHasone injection 4 mg  4 mg Intramuscular 1 time in Clinic/HOD Wilfred Menon MD        methylPREDNISolone acetate injection 40 mg  40 mg Intramuscular 1 time in Clinic/HOD Wilfred Menon MD           Allergies  Review of patient's allergies indicates:  No Known Allergies    Physical Examination     Vitals:    12/05/22 0726   BP: 114/82   Pulse: 98   Temp: 98.4 °F (36.9 °C)     Physical Exam  Constitutional:       General: He is not  in acute distress.     Appearance: He is not ill-appearing.   HENT:      Head: Normocephalic and atraumatic.      Right Ear: Tympanic membrane and ear canal normal.      Left Ear: Tympanic membrane and ear canal normal.      Nose: Congestion and rhinorrhea present.   Eyes:      Pupils: Pupils are equal, round, and reactive to light.   Cardiovascular:      Rate and Rhythm: Normal rate and regular rhythm.      Pulses: Normal pulses.      Heart sounds: No murmur heard.  Pulmonary:      Effort: No respiratory distress.      Breath sounds: No wheezing, rhonchi or rales.   Abdominal:      General: Bowel sounds are normal.      Palpations: Abdomen is soft.      Tenderness: There is no abdominal tenderness.      Hernia: No hernia is present.   Musculoskeletal:      Cervical back: Normal range of motion and neck supple.   Lymphadenopathy:      Cervical: No cervical adenopathy.   Skin:     General: Skin is warm and dry.   Neurological:      Mental Status: He is alert.   Psychiatric:         Behavior: Behavior normal.         Thought Content: Thought content normal.        Assessment and Plan (including Health Maintenance)   :    Plan:         Health Maintenance Due   Topic Date Due    Hepatitis C Screening  Never done    HIV Screening  Never done    TETANUS VACCINE  Never done    Colorectal Cancer Screening  Never done    Shingles Vaccine (1 of 2) Never done    COVID-19 Vaccine (4 - Booster for Moderna series) 01/27/2022       Problem List Items Addressed This Visit    None  Visit Diagnoses       Acute recurrent maxillary sinusitis    -  Primary    Relevant Medications    dexAMETHasone injection 4 mg (Start on 12/5/2022  8:30 AM)    methylPREDNISolone acetate injection 40 mg (Start on 12/5/2022  8:30 AM)    cefTRIAXone injection 1 g (Start on 12/5/2022  8:30 AM)    brompheniramine-pseudoeph-DM (BROMFED DM) 2-30-10 mg/5 mL Syrp    azithromycin (Z-BRIANNA) 250 MG tablet          Acute recurrent maxillary sinusitis  -      dexAMETHasone injection 4 mg  -     methylPREDNISolone acetate injection 40 mg  -     cefTRIAXone injection 1 g  -     brompheniramine-pseudoeph-DM (BROMFED DM) 2-30-10 mg/5 mL Syrp; Take 10 mLs by mouth every 4 (four) hours as needed (cough).  Dispense: 240 mL; Refill: 0  -     azithromycin (Z-BRIANNA) 250 MG tablet; Take 2 tablets by mouth on day 1; Take 1 tablet by mouth on days 2-5  Dispense: 6 tablet; Refill: 0     Health Maintenance Topics with due status: Not Due       Topic Last Completion Date    Lipid Panel 10/05/2022       Procedures     No future appointments.     No follow-ups on file.       Signature:  Wilfred Menon MD  [facility]  [address] [place] [address]  [phone] Phone  [phone] Fax    Date of encounter: 12/5/22

## 2022-12-05 NOTE — PROGRESS NOTES
Wilfred Menon MD   [facility]  [address] [place] [address]     PATIENT NAME: Quintin Sandoval  : 1965  DATE: 22  MRN: [id number]      Billing Provider: Wilfred Menon MD  Level of Service: PA OFFICE/OUTPT VISIT, EST, LEVL III, 20-29 MIN  Patient PCP Information       Provider PCP Type    Wilfred Menon MD General            Reason for Visit / Chief Complaint: Sinusitis (Headache, Sore throat, chills, sweats, coughing, body aches, ear pain off and on and PN drainage that started on Saturday. Home Covid Test on Saturday-negative. Felt some better yesterday, but feeling worse this morning. )         History of Present Illness / Problem Focused Workflow     Quintin Sandoval presents to the clinic with Sinusitis (Headache, Sore throat, chills, sweats, coughing, body aches, ear pain off and on and PN drainage that started on Saturday. Home Covid Test on Saturday-negative. Felt some better yesterday, but feeling worse this morning. )     HPI    Review of Systems     Review of Systems   Constitutional:  Negative for activity change, appetite change, fatigue and fever.   HENT:  Positive for nasal congestion, rhinorrhea, sinus pressure/congestion and sore throat. Negative for ear pain and hearing loss.    Respiratory:  Positive for cough. Negative for chest tightness and shortness of breath.    Cardiovascular:  Negative for chest pain and palpitations.   Gastrointestinal:  Negative for abdominal pain and fecal incontinence.   Genitourinary:  Negative for bladder incontinence, difficulty urinating and erectile dysfunction.   Musculoskeletal:  Negative for arthralgias.   Integumentary:  Negative for rash.   Neurological:  Negative for dizziness and headaches.      Medical / Social / Family History     Past Medical History:   Diagnosis Date    Hyperlipidemia        Past Surgical History:   Procedure Laterality Date    ANGIOPLASTY      HERNIA REPAIR      REPAIR  OF MENISCUS OF KNEE Right     SINUS SURGERY      x 4       Social History  Quintin Sandoval  reports that he has never smoked. He has quit using smokeless tobacco. He reports current alcohol use. He reports that he does not use drugs.    Family History  Quintin Sandoval  family history includes Diabetes in his mother; Heart disease in his mother.    Medications and Allergies     Medications  Outpatient Medications Marked as Taking for the 12/5/22 encounter (Office Visit) with Wilfred Menon MD   Medication Sig Dispense Refill    aspirin (ECOTRIN) 81 MG EC tablet Take 81 mg by mouth once daily.      atorvastatin (LIPITOR) 80 MG tablet Take 40 mg by mouth once daily.      b complex vitamins tablet Take 1 tablet by mouth once daily.      carvediloL (COREG) 6.25 MG tablet Take 6.25 mg by mouth 2 (two) times daily.      fexofenadine (ALLEGRA) 180 MG tablet Take 180 mg by mouth once daily.      fluticasone propionate (FLONASE) 50 mcg/actuation nasal spray 2 sprays by Each Nostril route once daily.      lisinopriL (PRINIVIL,ZESTRIL) 2.5 MG tablet Take 2.5 mg by mouth once daily.      montelukast (SINGULAIR) 10 mg tablet Take 1 tablet (10 mg total) by mouth every evening. 90 tablet 1     Current Facility-Administered Medications for the 12/5/22 encounter (Office Visit) with Wilfred Menon MD   Medication Dose Route Frequency Provider Last Rate Last Admin    cefTRIAXone injection 1 g  1 g Intramuscular 1 time in Clinic/HOD Wilfred Menon MD        dexAMETHasone injection 4 mg  4 mg Intramuscular 1 time in Clinic/HOD Wilfred Menon MD        methylPREDNISolone acetate injection 40 mg  40 mg Intramuscular 1 time in Clinic/HOD Wilfred Menon MD           Allergies  Review of patient's allergies indicates:  No Known Allergies    Physical Examination     Vitals:    12/05/22 0726   BP: 114/82   Pulse: 98   Temp: 98.4 °F (36.9 °C)     Physical Exam     Assessment and Plan (including Health  Maintenance)   :    Plan:         Health Maintenance Due   Topic Date Due    Hepatitis C Screening  Never done    HIV Screening  Never done    TETANUS VACCINE  Never done    Colorectal Cancer Screening  Never done    Shingles Vaccine (1 of 2) Never done    COVID-19 Vaccine (4 - Booster for Moderna series) 01/27/2022       Problem List Items Addressed This Visit    None  Visit Diagnoses       Acute recurrent maxillary sinusitis    -  Primary    Relevant Medications    dexAMETHasone injection 4 mg    methylPREDNISolone acetate injection 40 mg    cefTRIAXone injection 1 g    brompheniramine-pseudoeph-DM (BROMFED DM) 2-30-10 mg/5 mL Syrp    azithromycin (Z-BRIANNA) 250 MG tablet          Acute recurrent maxillary sinusitis  -     dexAMETHasone injection 4 mg  -     methylPREDNISolone acetate injection 40 mg  -     cefTRIAXone injection 1 g  -     brompheniramine-pseudoeph-DM (BROMFED DM) 2-30-10 mg/5 mL Syrp; Take 10 mLs by mouth every 4 (four) hours as needed (cough).  Dispense: 240 mL; Refill: 0  -     azithromycin (Z-BRIANNA) 250 MG tablet; Take 2 tablets by mouth on day 1; Take 1 tablet by mouth on days 2-5  Dispense: 6 tablet; Refill: 0     Health Maintenance Topics with due status: Not Due       Topic Last Completion Date    Lipid Panel 10/05/2022       Procedures     No future appointments.     No follow-ups on file.       Signature:  Wilfred Menon MD  [facility]  [address] [place] [address]  [phone] Phone  [phone] Fax    Date of encounter: 12/5/22

## 2022-12-15 ENCOUNTER — OFFICE VISIT (OUTPATIENT)
Dept: FAMILY MEDICINE | Facility: CLINIC | Age: 57
End: 2022-12-15
Payer: COMMERCIAL

## 2022-12-15 VITALS
BODY MASS INDEX: 29.11 KG/M2 | OXYGEN SATURATION: 99 % | HEIGHT: 73 IN | DIASTOLIC BLOOD PRESSURE: 88 MMHG | TEMPERATURE: 98 F | HEART RATE: 71 BPM | SYSTOLIC BLOOD PRESSURE: 138 MMHG | WEIGHT: 219.63 LBS

## 2022-12-15 DIAGNOSIS — M25.461 EFFUSION OF RIGHT KNEE: Primary | ICD-10-CM

## 2022-12-15 PROCEDURE — 3044F PR MOST RECENT HEMOGLOBIN A1C LEVEL <7.0%: ICD-10-PCS | Mod: CPTII,,, | Performed by: FAMILY MEDICINE

## 2022-12-15 PROCEDURE — 1159F MED LIST DOCD IN RCRD: CPT | Mod: CPTII,,, | Performed by: FAMILY MEDICINE

## 2022-12-15 PROCEDURE — 3075F PR MOST RECENT SYSTOLIC BLOOD PRESS GE 130-139MM HG: ICD-10-PCS | Mod: CPTII,,, | Performed by: FAMILY MEDICINE

## 2022-12-15 PROCEDURE — 4010F ACE/ARB THERAPY RXD/TAKEN: CPT | Mod: CPTII,,, | Performed by: FAMILY MEDICINE

## 2022-12-15 PROCEDURE — 3079F PR MOST RECENT DIASTOLIC BLOOD PRESSURE 80-89 MM HG: ICD-10-PCS | Mod: CPTII,,, | Performed by: FAMILY MEDICINE

## 2022-12-15 PROCEDURE — 4010F PR ACE/ARB THEARPY RXD/TAKEN: ICD-10-PCS | Mod: CPTII,,, | Performed by: FAMILY MEDICINE

## 2022-12-15 PROCEDURE — 3008F BODY MASS INDEX DOCD: CPT | Mod: CPTII,,, | Performed by: FAMILY MEDICINE

## 2022-12-15 PROCEDURE — 99213 OFFICE O/P EST LOW 20 MIN: CPT | Mod: 25,,, | Performed by: FAMILY MEDICINE

## 2022-12-15 PROCEDURE — 3075F SYST BP GE 130 - 139MM HG: CPT | Mod: CPTII,,, | Performed by: FAMILY MEDICINE

## 2022-12-15 PROCEDURE — 3079F DIAST BP 80-89 MM HG: CPT | Mod: CPTII,,, | Performed by: FAMILY MEDICINE

## 2022-12-15 PROCEDURE — 3044F HG A1C LEVEL LT 7.0%: CPT | Mod: CPTII,,, | Performed by: FAMILY MEDICINE

## 2022-12-15 PROCEDURE — 1159F PR MEDICATION LIST DOCUMENTED IN MEDICAL RECORD: ICD-10-PCS | Mod: CPTII,,, | Performed by: FAMILY MEDICINE

## 2022-12-15 PROCEDURE — 20610 LARGE JOINT ASPIRATION/INJECTION: R KNEE: ICD-10-PCS | Mod: RT,,, | Performed by: FAMILY MEDICINE

## 2022-12-15 PROCEDURE — 99213 PR OFFICE/OUTPT VISIT, EST, LEVL III, 20-29 MIN: ICD-10-PCS | Mod: 25,,, | Performed by: FAMILY MEDICINE

## 2022-12-15 PROCEDURE — 20610 DRAIN/INJ JOINT/BURSA W/O US: CPT | Mod: RT,,, | Performed by: FAMILY MEDICINE

## 2022-12-15 PROCEDURE — 3008F PR BODY MASS INDEX (BMI) DOCUMENTED: ICD-10-PCS | Mod: CPTII,,, | Performed by: FAMILY MEDICINE

## 2022-12-15 NOTE — PROGRESS NOTES
Wilfred Menon MD   [facility] [place]  [address] [place] [address]     PATIENT NAME: Quintin Sandoval  : 1965  DATE: 12/15/22  MRN: [id number]      Billing Provider: Wilfred Menon MD  Level of Service:   Patient PCP Information       Provider PCP Type    Wilfred Menon MD General            Reason for Visit / Chief Complaint: Knee Pain (Patient had appointment with Dr. [facility] in [place] on 22- Fluid aspiration and injection on 22 and started PT. Patient is currently going to PT at workplace. Return in 3 months-and possible TKR if no improvement. Last night, multiple episodes of sharp pain shooting through right knee. Swelling noted  to right knee yesterday. )         History of Present Illness / Problem Focused Workflow     Quintin Sandoval presents to the clinic with Knee Pain (Patient had appointment with Dr. Vasiliy Middleton in [place] on 22- Fluid aspiration and injection on 22 and started PT. Patient is currently going to PT at workplace. Return in 3 months-and possible TKR if no improvement. Last night, multiple episodes of sharp pain shooting through right knee. Swelling noted  to right knee yesterday. )     HPI    Review of Systems     Review of Systems   Constitutional:  Negative for activity change, appetite change, fatigue and fever.   HENT:  Negative for nasal congestion, ear pain, hearing loss, sinus pressure/congestion and sore throat.    Respiratory:  Negative for cough, chest tightness and shortness of breath.    Cardiovascular:  Negative for chest pain and palpitations.   Gastrointestinal:  Negative for abdominal pain and fecal incontinence.   Genitourinary:  Negative for bladder incontinence, difficulty urinating and erectile dysfunction.   Musculoskeletal:  Positive for joint swelling (right knee). Negative for arthralgias.   Integumentary:  Negative for rash.   Neurological:  Negative for  dizziness and headaches.      Medical / Social / Family History     Past Medical History:   Diagnosis Date    Hyperlipidemia        Past Surgical History:   Procedure Laterality Date    ANGIOPLASTY      HERNIA REPAIR      REPAIR OF MENISCUS OF KNEE Right     SINUS SURGERY      x 4       Social History  Quintin Sandoval  reports that he has never smoked. He has quit using smokeless tobacco. He reports current alcohol use. He reports that he does not use drugs.    Family History  Quintin Sandoval  family history includes Diabetes in his mother; Heart disease in his mother.    Medications and Allergies     Medications  Outpatient Medications Marked as Taking for the 12/15/22 encounter (Office Visit) with Wilfred Menon MD   Medication Sig Dispense Refill    aspirin (ECOTRIN) 81 MG EC tablet Take 81 mg by mouth once daily.      atorvastatin (LIPITOR) 80 MG tablet Take 40 mg by mouth once daily.      b complex vitamins tablet Take 1 tablet by mouth once daily.      carvediloL (COREG) 6.25 MG tablet Take 6.25 mg by mouth 2 (two) times daily.      fexofenadine (ALLEGRA) 180 MG tablet Take 180 mg by mouth once daily.      fluticasone propionate (FLONASE) 50 mcg/actuation nasal spray 2 sprays by Each Nostril route once daily.      lisinopriL (PRINIVIL,ZESTRIL) 2.5 MG tablet Take 2.5 mg by mouth once daily.      montelukast (SINGULAIR) 10 mg tablet Take 1 tablet (10 mg total) by mouth every evening. 90 tablet 1       Allergies  Review of patient's allergies indicates:  No Known Allergies    Physical Examination     Vitals:    12/15/22 0759   BP: 138/88   Pulse: 71   Temp: 97.6 °F (36.4 °C)     Physical Exam  Constitutional:       General: He is not in acute distress.     Appearance: He is not ill-appearing.   HENT:      Head: Normocephalic and atraumatic.      Right Ear: Tympanic membrane and ear canal normal.      Left Ear: Tympanic membrane and ear canal normal.      Nose: Nose normal. No congestion  or rhinorrhea.   Eyes:      Pupils: Pupils are equal, round, and reactive to light.   Cardiovascular:      Rate and Rhythm: Normal rate and regular rhythm.      Pulses: Normal pulses.      Heart sounds: No murmur heard.  Pulmonary:      Effort: No respiratory distress.      Breath sounds: No wheezing, rhonchi or rales.   Abdominal:      General: Bowel sounds are normal.      Palpations: Abdomen is soft.      Tenderness: There is no abdominal tenderness.      Hernia: No hernia is present.   Musculoskeletal:      Cervical back: Normal range of motion and neck supple.   Lymphadenopathy:      Cervical: No cervical adenopathy.   Skin:     General: Skin is warm and dry.   Neurological:      Mental Status: He is alert.   Psychiatric:         Behavior: Behavior normal.         Thought Content: Thought content normal.        Assessment and Plan (including Health Maintenance)   :    Plan:         Health Maintenance Due   Topic Date Due    Hepatitis C Screening  Never done    HIV Screening  Never done    TETANUS VACCINE  Never done    Colorectal Cancer Screening  Never done    Shingles Vaccine (1 of 2) Never done    COVID-19 Vaccine (4 - Booster for Moderna series) 01/27/2022       Problem List Items Addressed This Visit    None    There are no diagnoses linked to this encounter.   Health Maintenance Topics with due status: Not Due       Topic Last Completion Date    Lipid Panel 10/05/2022       Large Joint Aspiration/Injection: R knee    Date/Time: 12/15/2022 7:00 AM  Performed by: Wilfred Menon MD  Authorized by: Wilfred Menon MD     Indications:  Joint swelling and pain  Site marked: the procedure site was marked    Timeout: prior to procedure the correct patient, procedure, and site was verified    Local anesthesia used?: No      Details:  Needle Size:  18 G  Approach:  Lateral  Location:  Knee  Site:  R knee  Aspirate amount (mL):  19  Aspirate:  Yellow and blood-tinged  Patient tolerance:  Patient  tolerated the procedure well with no immediate complications     No future appointments.     No follow-ups on file.       Signature:  Wilfred Menon MD  [facility]  [address] [place] [address]  [phone] Phone  [phone] Fax    Date of encounter: 12/15/22

## 2023-03-01 DIAGNOSIS — M17.0 BILATERAL PRIMARY OSTEOARTHRITIS OF KNEE: Primary | ICD-10-CM

## 2023-03-07 ENCOUNTER — CLINICAL SUPPORT (OUTPATIENT)
Dept: REHABILITATION | Facility: HOSPITAL | Age: 58
End: 2023-03-07
Payer: COMMERCIAL

## 2023-03-07 DIAGNOSIS — G89.29 CHRONIC PAIN OF RIGHT KNEE: ICD-10-CM

## 2023-03-07 DIAGNOSIS — M25.561 CHRONIC PAIN OF RIGHT KNEE: ICD-10-CM

## 2023-03-07 DIAGNOSIS — M17.0 BILATERAL PRIMARY OSTEOARTHRITIS OF KNEE: ICD-10-CM

## 2023-03-07 PROCEDURE — 97110 THERAPEUTIC EXERCISES: CPT

## 2023-03-07 PROCEDURE — 97161 PT EVAL LOW COMPLEX 20 MIN: CPT

## 2023-03-09 ENCOUNTER — CLINICAL SUPPORT (OUTPATIENT)
Dept: REHABILITATION | Facility: HOSPITAL | Age: 58
End: 2023-03-09
Payer: COMMERCIAL

## 2023-03-09 DIAGNOSIS — G89.29 CHRONIC PAIN OF RIGHT KNEE: Primary | ICD-10-CM

## 2023-03-09 DIAGNOSIS — M25.561 CHRONIC PAIN OF RIGHT KNEE: Primary | ICD-10-CM

## 2023-03-09 PROCEDURE — 97110 THERAPEUTIC EXERCISES: CPT | Mod: CQ

## 2023-03-09 NOTE — PROGRESS NOTES
"[facility]   Physical Therapy Treatment Note     Name: Quintin Sandoval  Clinic Number: [id number]    Therapy Diagnosis: No diagnosis found.  Physician: Abdi Chino*    Visit Date: 3/9/2023    Physician Orders: PT Eval and Treat   Medical Diagnosis from Referral: B knee primary OA   Evaluation Date: 3/7/2023  Authorization Period Expiration: 3/7/2024  Plan of Care Expiration: 04/07/2023  Visit # / Visits authorized: 2/8     Time In: 13:00  Time Out: 14:02  Total Appointment Time (timed & untimed codes): 62 minutes (56 minutes billable and 6 minutes not billable for ICE)     Precautions: Standard    PTA Visit #: 1/5       SUBJECTIVE   Pt reports: "I'm pretty sore today because I was up working all last night".    He was compliant with home exercise program.  Response to previous treatment: Post tx soreness  Functional change: Too early in POC    Pain: 3/10  Location: right knee      OBJECTIVE     Objective Measures updated at progress report unless specified.     Treatment     Leno received the treatments listed below:  THERAPEUTIC EXERCISES to develop strength, endurance, ROM, and flexibility. See flowsheet below:     Ther- Ex Reps   Bike 6 minutes    Wedge 2 minutes    Hamstring Stretch 3 x 30" each *   Heelslides 30 x each *   LAQ's 20 x 3" B   Hamstring Curls 20 x green TB B   Quad sets  20 x 3" each    SAQ's 20 x 3" B    Straight Leg Raises 2 x 10 B    Sidelying Hip ABD  2 x 10 B                   Manual Therapy: Thera-roller to R IT band to decrease tissue tightness and restrictions and decrease pain with knee flexion.      Cold Pack for 6  minutes to B knees with elevation above heart level to decrease post exercise irritation and R knee edema.    received the treatments listed below:        Patient Education and Home Exercises     Home Exercises Provided and Patient Education Provided     Education provided:   - Plan of Care, Home exercise program, Delayed onset muscle " soreness     Written Home Exercises Provided: Patient instructed to cont prior HEP. Exercises were reviewed and Leno was able to demonstrate them prior to the end of the session.  Leno demonstrated good  understanding of the education provided. See EMR under Patient Instructions for exercises provided during therapy sessions    ASSESSMENT   Quintin is a 57 y.o. male referred to outpatient Physical Therapy with a medical diagnosis of B knee primary OA. Pt presents with B knee pain (R>L), decreased LE muscle strength, and muscle tightness. Patient's impairments have limited his overall activity tolerance at this time.  LPTA provided pt with proper set-up on rec bike to increase R knee flexion ROM and provide musculoskeletal warm-up. Pt required mod cueing for proper lower extremity alignment, form, speed, hold times, and decreased compensations. Pt c/o pain during eccentric motion of Long Arc Quad's.  Manual therapy with-held to decrease right  iliotibial band irritation. No adverse effects noted to tx.      Pt prognosis is Fair.   Pt will benefit from skilled outpatient Physical Therapy to address the deficits stated above and in the chart below, provide pt/family education, and to maximize pt's level of independence.      Plan of care discussed with patient: Yes  Pt's spiritual, cultural and educational needs considered and patient is agreeable to the plan of care and goals as stated below:      Anticipated Barriers for therapy: chronicity of pain, structural malalignment of B knees     Goals:  Pt will increase B knee flexion to 130 degrees, knee extension to 0 degrees, and quad lag to 0 degrees to allow patient normal gait pattern.  Pt will increase R knee strength to 4+/5 to allow patient to safely return to prior level of function.   Pt will report decrease in pain to 4/10 when climbing stairs to improve quality of life.    PLAN   Continue POC per PT orders to progress patient toward rehab goals.   David Cheney,  LPTA  3/9/2023

## 2023-03-17 ENCOUNTER — CLINICAL SUPPORT (OUTPATIENT)
Dept: REHABILITATION | Facility: HOSPITAL | Age: 58
End: 2023-03-17
Payer: COMMERCIAL

## 2023-03-17 DIAGNOSIS — G89.29 CHRONIC PAIN OF RIGHT KNEE: Primary | ICD-10-CM

## 2023-03-17 DIAGNOSIS — M25.561 CHRONIC PAIN OF RIGHT KNEE: Primary | ICD-10-CM

## 2023-03-17 PROCEDURE — 97110 THERAPEUTIC EXERCISES: CPT | Mod: CQ

## 2023-03-17 NOTE — PROGRESS NOTES
"[facility]   Physical Therapy Treatment Note     Name: Quintin Sandoval  Clinic Number: [id number]    Therapy Diagnosis:   Encounter Diagnosis   Name Primary?    Chronic pain of right knee Yes     Physician: Abdi Chino*    Visit Date: 3/17/2023    Physician Orders: PT Eval and Treat   Medical Diagnosis from Referral: B knee primary OA   Evaluation Date: 3/7/2023  Authorization Period Expiration: 3/7/2024  Plan of Care Expiration: 04/07/2023  Visit # / Visits authorized: 2/8     Time In: 10:00  Time Out: 11:04  Total Appointment Time (timed & untimed codes): 64 minutes (58 minutes billable and 6 minutes not billable for ICE)     Precautions: Standard    PTA Visit #: 1/5       SUBJECTIVE   Pt reports: "I'm pretty sore today because I was up working all last night".    He was compliant with home exercise program.  Response to previous treatment: Post tx soreness  Functional change: Too early in POC    Pain: 3/10  Location: right knee      OBJECTIVE     Objective Measures updated at progress report unless specified.     Treatment     Leno received the treatments listed below:  THERAPEUTIC EXERCISES to develop strength, endurance, ROM, and flexibility. See flowsheet below:     Ther- Ex Reps   Bike 6 minutes    Wedge 2 minutes    Hamstring Stretch 3 x 30" each    Heelslides 30 x each    LAQ's 20 x 3" B   Hamstring Curls 20 x green TB B   Quad sets  20 x 3" each    SAQ's 20 x 3" B    Straight Leg Raises 2 x 10 B    Sidelying Hip ABD  2 x 10 B                   Manual Therapy: Thera-roller to R IT band to decrease tissue tightness and restrictions and decrease pain with knee flexion.      Cold Pack for 6  minutes to B knees with elevation above heart level to decrease post exercise irritation and R knee edema.    received the treatments listed below:        Patient Education and Home Exercises     Home Exercises Provided and Patient Education Provided     Education provided:   - Plan " of Care, Home exercise program, Delayed onset muscle soreness     Written Home Exercises Provided: Patient instructed to cont prior HEP. Exercises were reviewed and Leno was able to demonstrate them prior to the end of the session.  Leno demonstrated good  understanding of the education provided. See EMR under Patient Instructions for exercises provided during therapy sessions    ASSESSMENT   Quintin is a 57 y.o. male referred to outpatient Physical Therapy with a medical diagnosis of B knee primary OA. Pt presents with B knee pain (R>L), decreased LE muscle strength, and muscle tightness. Patient's impairments have limited his overall activity tolerance at this time.  LPTA provided pt with proper set-up on rec bike to increase R knee flexion ROM and provide musculoskeletal warm-up. Pt displayed increased carryover of previous tx session. Pt continues to c/o pain with eccentric motion of Long Arc Quad's.  No adverse effects noted to tx.      Pt prognosis is Fair.   Pt will benefit from skilled outpatient Physical Therapy to address the deficits stated above and in the chart below, provide pt/family education, and to maximize pt's level of independence.      Plan of care discussed with patient: Yes  Pt's spiritual, cultural and educational needs considered and patient is agreeable to the plan of care and goals as stated below:      Anticipated Barriers for therapy: chronicity of pain, structural malalignment of B knees     Goals:  Pt will increase B knee flexion to 130 degrees, knee extension to 0 degrees, and quad lag to 0 degrees to allow patient normal gait pattern.  Pt will increase R knee strength to 4+/5 to allow patient to safely return to prior level of function.   Pt will report decrease in pain to 4/10 when climbing stairs to improve quality of life.    PLAN   Continue POC per PT orders to progress patient toward rehab goals.   STAN Alvarado  3/17/2023

## 2023-03-21 ENCOUNTER — CLINICAL SUPPORT (OUTPATIENT)
Dept: REHABILITATION | Facility: HOSPITAL | Age: 58
End: 2023-03-21
Payer: COMMERCIAL

## 2023-03-21 DIAGNOSIS — M25.561 CHRONIC PAIN OF RIGHT KNEE: Primary | ICD-10-CM

## 2023-03-21 DIAGNOSIS — G89.29 CHRONIC PAIN OF RIGHT KNEE: Primary | ICD-10-CM

## 2023-03-21 PROCEDURE — 97530 THERAPEUTIC ACTIVITIES: CPT | Mod: CQ

## 2023-03-21 PROCEDURE — 97110 THERAPEUTIC EXERCISES: CPT | Mod: CQ

## 2023-03-21 NOTE — PROGRESS NOTES
"[facility]   Physical Therapy Treatment Note     Name: Quintin Sandoval  Clinic Number: [id number]    Therapy Diagnosis:   Encounter Diagnosis   Name Primary?    Chronic pain of right knee Yes     Physician: Abdi Chino*    Visit Date: 3/21/2023    Physician Orders: PT Eval and Treat   Medical Diagnosis from Referral: B knee primary OA   Evaluation Date: 3/7/2023  Authorization Period Expiration: 3/7/2024  Plan of Care Expiration: 04/07/2023  Visit # / Visits authorized: 3/8     Time In: 12:00  Time Out: 12:57  Total Appointment Time (timed & untimed codes): 57 minutes   Precautions: Standard    PTA Visit #: 1/5       SUBJECTIVE   Pt reports: "I feel okay today".    He was compliant with home exercise program.  Response to previous treatment: Post tx soreness  Functional change: Too early in POC    Pain: 3/10  Location: right knee      OBJECTIVE     Objective Measures updated at progress report unless specified.     Treatment     Leno received the treatments listed below:  THERAPEUTIC EXERCISES to develop strength, endurance, ROM, and flexibility. See flowsheet below:     Ther- Ex Reps   Bike 6 minutes    Wedge 2 minutes    Hamstring Stretch 3 x 30" each    Heelslides 30 x each    LAQ's 20 x 3" B   Hamstring Curls 20 x green TB B   Quad sets  20 x 3" each    SAQ's 20 x 3" B    Straight Leg Raises 2 x 10 B    Sidelying Hip ABD  2 x 10 B          Ther-Act Reps   Heel Raises 30 x    3 way hip 2 x 10 each   Touch and Go's 15 x              With-held---Manual Therapy: Thera-roller to R IT band to decrease tissue tightness and restrictions and decrease pain with knee flexion.      Declined---Cold Pack for 6  minutes to B knees with elevation above heart level to decrease post exercise irritation and R knee edema.    received the treatments listed below:        Patient Education and Home Exercises     Home Exercises Provided and Patient Education Provided     Education provided:   - " Plan of Care, Home exercise program, Delayed onset muscle soreness     Written Home Exercises Provided: Patient instructed to cont prior HEP. Exercises were reviewed and Leno was able to demonstrate them prior to the end of the session.  Leno demonstrated good  understanding of the education provided. See EMR under Patient Instructions for exercises provided during therapy sessions    ASSESSMENT   Quintin is a 57 y.o. male referred to outpatient Physical Therapy with a medical diagnosis of B knee primary OA. Pt presents with B knee pain (R>L), decreased LE muscle strength, and muscle tightness. Patient's impairments have limited his overall activity tolerance at this time.  Increased pt tx intensity within pt tolerance. Pt displayed increased carryover of previous exercises and increased understanding of added exercises. Pt expresses discomfort at end range of eccentric and beginning of concentric motion during touch and  go's. LPTA provided pt with proper set-up on rec bike to increase R knee flexion ROM and provide musculoskeletal warm-up. Pt continues to c/o discomfort at beginning of eccentric motion during Long Arc Quad's.  Pt declined modalities. No adverse effects noted to tx.      Pt prognosis is Fair.   Pt will benefit from skilled outpatient Physical Therapy to address the deficits stated above and in the chart below, provide pt/family education, and to maximize pt's level of independence.      Plan of care discussed with patient: Yes  Pt's spiritual, cultural and educational needs considered and patient is agreeable to the plan of care and goals as stated below:      Anticipated Barriers for therapy: chronicity of pain, structural malalignment of B knees     Goals:  Pt will increase B knee flexion to 130 degrees, knee extension to 0 degrees, and quad lag to 0 degrees to allow patient normal gait pattern.  Pt will increase R knee strength to 4+/5 to allow patient to safely return to prior level of function.    Pt will report decrease in pain to 4/10 when climbing stairs to improve quality of life.    PLAN   Continue POC per PT orders to progress patient toward rehab goals.   STAN Alvarado  3/21/2023

## 2023-03-27 ENCOUNTER — CLINICAL SUPPORT (OUTPATIENT)
Dept: REHABILITATION | Facility: HOSPITAL | Age: 58
End: 2023-03-27
Payer: COMMERCIAL

## 2023-03-27 DIAGNOSIS — G89.29 CHRONIC PAIN OF RIGHT KNEE: Primary | ICD-10-CM

## 2023-03-27 DIAGNOSIS — M25.561 CHRONIC PAIN OF RIGHT KNEE: Primary | ICD-10-CM

## 2023-03-27 PROCEDURE — 97530 THERAPEUTIC ACTIVITIES: CPT

## 2023-03-27 PROCEDURE — 97110 THERAPEUTIC EXERCISES: CPT

## 2023-03-27 NOTE — PROGRESS NOTES
"[facility]   Physical Therapy Treatment Note     Name: Quintin Sandoval  Clinic Number: [id number]    Therapy Diagnosis:   Encounter Diagnosis   Name Primary?    Chronic pain of right knee Yes     Physician: Abdi Chino*    Visit Date: 3/27/2023    Physician Orders: PT Eval and Treat   Medical Diagnosis from Referral: B knee primary OA   Evaluation Date: 3/7/2023  Authorization Period Expiration: 3/7/2024  Plan of Care Expiration: 04/07/2023  Visit # / Visits authorized: 5/8     Time In: 8:00 (later check in time)   Time Out: 8:49  Total Appointment Time (timed & untimed codes): 49 minutes   Precautions: Standard    PTA Visit #: 0/5       SUBJECTIVE   Pt reports: "It's a little tender feeling today."     He was compliant with home exercise program.  Response to previous treatment: Post tx soreness  Functional change: Too early in POC    Pain: 3/10  Location: right knee      OBJECTIVE     Objective Measures updated at progress report unless specified.     Treatment     Leno received the treatments listed below:  THERAPEUTIC EXERCISES to develop strength, endurance, ROM, and flexibility. See flowsheet below: *indicates increases in reps or resistance during this treatment session  THERAPEUTIC ACTIVITIES to increase functional endurance and strength of lower extremities including: See flowsheet below: *indicates increases in reps or resistance during this treatment session         Ther- Ex Reps   Bike 10 minutes    Wedge 2 minutes    Hamstring Stretch 3 x 30" each    Heelslides 30 x each    LAQ's 20 x 3" 1.5 # *   Hamstring Curls 30 x green TB    Quad sets  30 x 3" each    SAQ's 20 x 3" 1.5 # *   Straight Leg Raises 2 x 10 1.5 # *   Sidelying Hip ABD  2 x 10 1.5# *         Ther-Act Reps   Heel Raises 30 x    3 way hip 2 x 10 each   Touch and Go's 20 x   FSU 2 x 10 high step *           Patient Education and Home Exercises     Home Exercises Provided and Patient Education Provided "     Education provided:   - Plan of Care, Home exercise program, Delayed onset muscle soreness     Written Home Exercises Provided: Patient instructed to cont prior HEP. Exercises were reviewed and Leno was able to demonstrate them prior to the end of the session.  Leno demonstrated good  understanding of the education provided. See EMR under Patient Instructions for exercises provided during therapy sessions    ASSESSMENT   Quintin is a 57 y.o. male referred to outpatient Physical Therapy with a medical diagnosis of B knee primary OA. Pt presents with B knee pain (R>L), decreased LE muscle strength, and muscle tightness. Patient's impairments have limited his overall activity tolerance at this time.  Increased pt tx intensity within pt tolerance. PT provided patient with proper set-up on NuStep to increase R knee ROM and decrease stiffness prior to exercise. Patient continues to demonstrate good carryover of exercise instruction requiring only minimal cues for proper form and improved motor control. Patient tolerated increased resistance with quad strengthening tasks with no adverse effects. Patient declined modalities post treatment.       Pt prognosis is Fair.   Pt will benefit from skilled outpatient Physical Therapy to address the deficits stated above and in the chart below, provide pt/family education, and to maximize pt's level of independence.      Plan of care discussed with patient: Yes  Pt's spiritual, cultural and educational needs considered and patient is agreeable to the plan of care and goals as stated below:      Anticipated Barriers for therapy: chronicity of pain, structural malalignment of B knees     Goals:  Pt will increase B knee flexion to 130 degrees, knee extension to 0 degrees, and quad lag to 0 degrees to allow patient normal gait pattern.  Pt will increase R knee strength to 4+/5 to allow patient to safely return to prior level of function.   Pt will report decrease in pain to 4/10 when  climbing stairs to improve quality of life.    PLAN   Continue POC per PT orders to progress patient toward rehab goals.   Francisco Bell, PT, DPT   3/27/2023

## 2023-03-28 ENCOUNTER — CLINICAL SUPPORT (OUTPATIENT)
Dept: REHABILITATION | Facility: HOSPITAL | Age: 58
End: 2023-03-28
Payer: COMMERCIAL

## 2023-03-28 ENCOUNTER — DOCUMENTATION ONLY (OUTPATIENT)
Dept: REHABILITATION | Facility: HOSPITAL | Age: 58
End: 2023-03-28
Payer: COMMERCIAL

## 2023-03-28 DIAGNOSIS — G89.29 CHRONIC PAIN OF RIGHT KNEE: Primary | ICD-10-CM

## 2023-03-28 DIAGNOSIS — M25.561 CHRONIC PAIN OF RIGHT KNEE: Primary | ICD-10-CM

## 2023-03-28 PROCEDURE — 97110 THERAPEUTIC EXERCISES: CPT | Mod: CQ

## 2023-03-28 PROCEDURE — 97530 THERAPEUTIC ACTIVITIES: CPT | Mod: CQ

## 2023-03-28 NOTE — PROGRESS NOTES
"[facility]   Physical Therapy Treatment Note     Name: Quintin Sandoval  Clinic Number: [id number]    Therapy Diagnosis:   No diagnosis found.    Physician: Abdi Chino*    Visit Date: 3/28/2023    Physician Orders: PT Eval and Treat   Medical Diagnosis from Referral: B knee primary OA   Evaluation Date: 3/7/2023  Authorization Period Expiration: 3/7/2024  Plan of Care Expiration: 04/07/2023  Visit # / Visits authorized: 6/8     Time In: 15:00  Time Out: 15:44  Total Appointment Time (timed & untimed codes): 44 minutes   Precautions: Standard    PTA Visit #: 1/5      SUBJECTIVE   Pt reports:  "I went the gym already today.  It's a good day.  It's probably a 0.5/10".     He was compliant with home exercise program.  Response to previous treatment: Post tx soreness  Functional change: Too early in POC    Pain: 0.5  Location: right knee      OBJECTIVE     Objective Measures updated at progress report unless specified.     Treatment     Leno received the treatments listed below:  THERAPEUTIC EXERCISES to develop strength, endurance, ROM, and flexibility. See flowsheet below: *indicates increases in reps or resistance during this treatment session  THERAPEUTIC ACTIVITIES to increase functional endurance and strength of lower extremities including: See flowsheet below: *indicates increases in reps or resistance during this treatment session         Ther- Ex Reps   Bike 10 minutes    Wedge 2 minutes    Hamstring Stretch 3 x 30" each    Heelslides 30 x each    LAQ's 20 x 3" 1.5 # *   Hamstring Curls 30 x green TB    Quad sets  30 x 3" each (not completed)    SAQ's 20 x 3" 1.5 # * (not completed)    Straight Leg Raises 2 x 10 1.5 # *   Sidelying Hip ABD  2 x 10 1.5# *         Ther-Act Reps   Heel Raises 30 x    3 way hip 2 x 10 each   Touch and Go's 20 x   FSU 2 x 10 high step *           Patient Education and Home Exercises     Home Exercises Provided and Patient Education Provided "     Education provided:   - Plan of Care, Home exercise program, Delayed onset muscle soreness     Written Home Exercises Provided: Patient instructed to cont prior HEP. Exercises were reviewed and Leno was able to demonstrate them prior to the end of the session.  Leno demonstrated good  understanding of the education provided. See EMR under Patient Instructions for exercises provided during therapy sessions    ASSESSMENT   Quintin is a 57 y.o. male referred to outpatient Physical Therapy with a medical diagnosis of B knee primary OA. Pt presents with B knee pain (R>L), decreased LE muscle strength, and muscle tightness. Patient's impairments have limited his overall activity tolerance at this time.  Increased pt tx intensity within pt tolerance. PT provided patient with proper set-up on NuStep to increase R knee ROM and decrease stiffness prior to exercise. Patient continues to demonstrate good carryover of exercise instruction requiring only minimal cues for proper form and improved motor control. Patient tolerated increased resistance with quad strengthening tasks with no adverse effects. Patient declined modalities post treatment.       Pt prognosis is Fair.   Pt will benefit from skilled outpatient Physical Therapy to address the deficits stated above and in the chart below, provide pt/family education, and to maximize pt's level of independence.      Plan of care discussed with patient: Yes  Pt's spiritual, cultural and educational needs considered and patient is agreeable to the plan of care and goals as stated below:      Anticipated Barriers for therapy: chronicity of pain, structural malalignment of B knees     Goals:  Pt will increase B knee flexion to 130 degrees, knee extension to 0 degrees, and quad lag to 0 degrees to allow patient normal gait pattern.  Pt will increase R knee strength to 4+/5 to allow patient to safely return to prior level of function.   Pt will report decrease in pain to 4/10 when  climbing stairs to improve quality of life.    PLAN   Plan to discharge patient from physical therapy services per PT order.  STAN Reyna   3/28/2023

## 2023-03-28 NOTE — PROGRESS NOTES
Outpatient Therapy Discharge Summary     Name: Quintin Sandoval  Clinic Number: [id number]     Therapy Diagnosis:        Encounter Diagnosis   Name Primary?    Chronic pain of right knee Yes      Physician: Abdi Chino*     Visit Date: 3/27/2023     Physician Orders: PT Eval and Treat   Medical Diagnosis from Referral: B knee primary OA   Evaluation Date: 3/7/2023    Date of Last visit: 3/28/2023  Total Visits Received: 6  Cancelled Visits: 2  No Show Visits: 0    Assessment    Goals:  Pt will increase B knee flexion to 130 degrees, knee extension to 0 degrees, and quad lag to 0 degrees to allow patient normal gait pattern.-MET   Pt will increase R knee strength to 4+/5 to allow patient to safely return to prior level of function. -MET   Pt will report decrease in pain to 4/10 when climbing stairs to improve quality of life.-MET     Discharge reason: Patient has met all of his goals    Plan   This patient is discharged from Physical Therapy.     Francisco Bell, PT, DPT

## 2023-05-01 ENCOUNTER — OFFICE VISIT (OUTPATIENT)
Dept: FAMILY MEDICINE | Facility: CLINIC | Age: 58
End: 2023-05-01
Payer: COMMERCIAL

## 2023-05-01 VITALS
WEIGHT: 214.38 LBS | SYSTOLIC BLOOD PRESSURE: 132 MMHG | TEMPERATURE: 98 F | DIASTOLIC BLOOD PRESSURE: 86 MMHG | OXYGEN SATURATION: 99 % | HEART RATE: 72 BPM | BODY MASS INDEX: 28.41 KG/M2 | HEIGHT: 73 IN

## 2023-05-01 DIAGNOSIS — E78.5 HYPERLIPIDEMIA, UNSPECIFIED HYPERLIPIDEMIA TYPE: ICD-10-CM

## 2023-05-01 DIAGNOSIS — I10 HTN (HYPERTENSION), BENIGN: Primary | ICD-10-CM

## 2023-05-01 DIAGNOSIS — Z11.59 ENCOUNTER FOR HEPATITIS C SCREENING TEST FOR LOW RISK PATIENT: ICD-10-CM

## 2023-05-01 DIAGNOSIS — E11.9 TYPE 2 DIABETES MELLITUS WITHOUT COMPLICATION, WITHOUT LONG-TERM CURRENT USE OF INSULIN: ICD-10-CM

## 2023-05-01 DIAGNOSIS — R73.09 HEMOGLOBIN A1C LESS THAN 7.0%: ICD-10-CM

## 2023-05-01 LAB
ALBUMIN SERPL BCP-MCNC: 4.1 G/DL (ref 3.5–5)
ALBUMIN/GLOB SERPL: 1.6 {RATIO}
ALP SERPL-CCNC: 53 U/L (ref 45–115)
ALT SERPL W P-5'-P-CCNC: 41 U/L (ref 16–61)
ANION GAP SERPL CALCULATED.3IONS-SCNC: 7 MMOL/L (ref 7–16)
AST SERPL W P-5'-P-CCNC: 26 U/L (ref 15–37)
BASOPHILS # BLD AUTO: 0.05 K/UL (ref 0–0.2)
BASOPHILS NFR BLD AUTO: 1 % (ref 0–1)
BILIRUB SERPL-MCNC: 0.8 MG/DL (ref ?–1.2)
BUN SERPL-MCNC: 21 MG/DL (ref 7–18)
BUN/CREAT SERPL: 24 (ref 6–20)
CALCIUM SERPL-MCNC: 9.2 MG/DL (ref 8.5–10.1)
CHLORIDE SERPL-SCNC: 107 MMOL/L (ref 98–107)
CHOLEST SERPL-MCNC: 121 MG/DL (ref 0–200)
CHOLEST/HDLC SERPL: 3.3 {RATIO}
CO2 SERPL-SCNC: 28 MMOL/L (ref 21–32)
CREAT SERPL-MCNC: 0.88 MG/DL (ref 0.7–1.3)
DIFFERENTIAL METHOD BLD: ABNORMAL
EGFR (NO RACE VARIABLE) (RUSH/TITUS): 102 ML/MIN/1.73M2
EOSINOPHIL # BLD AUTO: 0.91 K/UL (ref 0–0.5)
EOSINOPHIL NFR BLD AUTO: 18.8 % (ref 1–4)
ERYTHROCYTE [DISTWIDTH] IN BLOOD BY AUTOMATED COUNT: 11.8 % (ref 11.5–14.5)
EST. AVERAGE GLUCOSE BLD GHB EST-MCNC: 120 MG/DL
GLOBULIN SER-MCNC: 2.6 G/DL (ref 2–4)
GLUCOSE SERPL-MCNC: 148 MG/DL (ref 74–106)
HBA1C MFR BLD HPLC: 6.2 % (ref 4.5–6.6)
HCT VFR BLD AUTO: 42.8 % (ref 40–54)
HCV AB SER QL: NORMAL
HDLC SERPL-MCNC: 37 MG/DL (ref 40–60)
HGB BLD-MCNC: 14.4 G/DL (ref 13.5–18)
IMM GRANULOCYTES # BLD AUTO: 0.01 K/UL (ref 0–0.04)
IMM GRANULOCYTES NFR BLD: 0.2 % (ref 0–0.4)
LDLC SERPL CALC-MCNC: 55 MG/DL
LDLC/HDLC SERPL: 1.5 {RATIO}
LYMPHOCYTES # BLD AUTO: 1.68 K/UL (ref 1–4.8)
LYMPHOCYTES NFR BLD AUTO: 34.7 % (ref 27–41)
MCH RBC QN AUTO: 30.9 PG (ref 27–31)
MCHC RBC AUTO-ENTMCNC: 33.6 G/DL (ref 32–36)
MCV RBC AUTO: 91.8 FL (ref 80–96)
MONOCYTES # BLD AUTO: 0.41 K/UL (ref 0–0.8)
MONOCYTES NFR BLD AUTO: 8.5 % (ref 2–6)
MPC BLD CALC-MCNC: 10.1 FL (ref 9.4–12.4)
NEUTROPHILS # BLD AUTO: 1.78 K/UL (ref 1.8–7.7)
NEUTROPHILS NFR BLD AUTO: 36.8 % (ref 53–65)
NONHDLC SERPL-MCNC: 84 MG/DL
NRBC # BLD AUTO: 0 X10E3/UL
NRBC, AUTO (.00): 0 %
PLATELET # BLD AUTO: 260 K/UL (ref 150–400)
POTASSIUM SERPL-SCNC: 4.1 MMOL/L (ref 3.5–5.1)
PROT SERPL-MCNC: 6.7 G/DL (ref 6.4–8.2)
RBC # BLD AUTO: 4.66 M/UL (ref 4.6–6.2)
SODIUM SERPL-SCNC: 138 MMOL/L (ref 136–145)
TRIGL SERPL-MCNC: 147 MG/DL (ref 35–150)
VLDLC SERPL-MCNC: 29 MG/DL
WBC # BLD AUTO: 4.84 K/UL (ref 4.5–11)

## 2023-05-01 PROCEDURE — 96372 PR INJECTION,THERAP/PROPH/DIAG2ST, IM OR SUBCUT: ICD-10-PCS | Mod: ,,, | Performed by: FAMILY MEDICINE

## 2023-05-01 PROCEDURE — 3075F PR MOST RECENT SYSTOLIC BLOOD PRESS GE 130-139MM HG: ICD-10-PCS | Mod: CPTII,,, | Performed by: FAMILY MEDICINE

## 2023-05-01 PROCEDURE — 80061 LIPID PANEL: CPT | Mod: ,,, | Performed by: CLINICAL MEDICAL LABORATORY

## 2023-05-01 PROCEDURE — 86803 HEPATITIS C ANTIBODY: ICD-10-PCS | Mod: ,,, | Performed by: CLINICAL MEDICAL LABORATORY

## 2023-05-01 PROCEDURE — 3008F BODY MASS INDEX DOCD: CPT | Mod: CPTII,,, | Performed by: FAMILY MEDICINE

## 2023-05-01 PROCEDURE — 83036 HEMOGLOBIN A1C: ICD-10-PCS | Mod: ,,, | Performed by: CLINICAL MEDICAL LABORATORY

## 2023-05-01 PROCEDURE — 83036 HEMOGLOBIN GLYCOSYLATED A1C: CPT | Mod: ,,, | Performed by: CLINICAL MEDICAL LABORATORY

## 2023-05-01 PROCEDURE — 80053 COMPREHENSIVE METABOLIC PANEL: ICD-10-PCS | Mod: ,,, | Performed by: CLINICAL MEDICAL LABORATORY

## 2023-05-01 PROCEDURE — 86803 HEPATITIS C AB TEST: CPT | Mod: ,,, | Performed by: CLINICAL MEDICAL LABORATORY

## 2023-05-01 PROCEDURE — 80053 COMPREHEN METABOLIC PANEL: CPT | Mod: ,,, | Performed by: CLINICAL MEDICAL LABORATORY

## 2023-05-01 PROCEDURE — 99214 PR OFFICE/OUTPT VISIT, EST, LEVL IV, 30-39 MIN: ICD-10-PCS | Mod: 25,,, | Performed by: FAMILY MEDICINE

## 2023-05-01 PROCEDURE — 85025 CBC WITH DIFFERENTIAL: ICD-10-PCS | Mod: ,,, | Performed by: CLINICAL MEDICAL LABORATORY

## 2023-05-01 PROCEDURE — 96372 THER/PROPH/DIAG INJ SC/IM: CPT | Mod: ,,, | Performed by: FAMILY MEDICINE

## 2023-05-01 PROCEDURE — 1159F PR MEDICATION LIST DOCUMENTED IN MEDICAL RECORD: ICD-10-PCS | Mod: CPTII,,, | Performed by: FAMILY MEDICINE

## 2023-05-01 PROCEDURE — 3079F PR MOST RECENT DIASTOLIC BLOOD PRESSURE 80-89 MM HG: ICD-10-PCS | Mod: CPTII,,, | Performed by: FAMILY MEDICINE

## 2023-05-01 PROCEDURE — 3075F SYST BP GE 130 - 139MM HG: CPT | Mod: CPTII,,, | Performed by: FAMILY MEDICINE

## 2023-05-01 PROCEDURE — 1159F MED LIST DOCD IN RCRD: CPT | Mod: CPTII,,, | Performed by: FAMILY MEDICINE

## 2023-05-01 PROCEDURE — 3044F HG A1C LEVEL LT 7.0%: CPT | Mod: CPTII,,, | Performed by: FAMILY MEDICINE

## 2023-05-01 PROCEDURE — 3079F DIAST BP 80-89 MM HG: CPT | Mod: CPTII,,, | Performed by: FAMILY MEDICINE

## 2023-05-01 PROCEDURE — 3008F PR BODY MASS INDEX (BMI) DOCUMENTED: ICD-10-PCS | Mod: CPTII,,, | Performed by: FAMILY MEDICINE

## 2023-05-01 PROCEDURE — 3044F PR MOST RECENT HEMOGLOBIN A1C LEVEL <7.0%: ICD-10-PCS | Mod: CPTII,,, | Performed by: FAMILY MEDICINE

## 2023-05-01 PROCEDURE — 99214 OFFICE O/P EST MOD 30 MIN: CPT | Mod: 25,,, | Performed by: FAMILY MEDICINE

## 2023-05-01 PROCEDURE — 85025 COMPLETE CBC W/AUTO DIFF WBC: CPT | Mod: ,,, | Performed by: CLINICAL MEDICAL LABORATORY

## 2023-05-01 PROCEDURE — 80061 LIPID PANEL: ICD-10-PCS | Mod: ,,, | Performed by: CLINICAL MEDICAL LABORATORY

## 2023-05-01 RX ORDER — FLUTICASONE PROPIONATE 50 MCG
2 SPRAY, SUSPENSION (ML) NASAL DAILY
Qty: 16 G | Refills: 5 | Status: SHIPPED | OUTPATIENT
Start: 2023-05-01

## 2023-05-01 RX ORDER — METHYLPREDNISOLONE ACETATE 80 MG/ML
40 INJECTION, SUSPENSION INTRA-ARTICULAR; INTRALESIONAL; INTRAMUSCULAR; SOFT TISSUE
Status: COMPLETED | OUTPATIENT
Start: 2023-05-01 | End: 2023-05-01

## 2023-05-01 RX ORDER — CEFTRIAXONE 1 G/1
1 INJECTION, POWDER, FOR SOLUTION INTRAMUSCULAR; INTRAVENOUS
Status: COMPLETED | OUTPATIENT
Start: 2023-05-01 | End: 2023-05-01

## 2023-05-01 RX ORDER — MONTELUKAST SODIUM 10 MG/1
10 TABLET ORAL NIGHTLY
Qty: 90 TABLET | Refills: 1 | Status: SHIPPED | OUTPATIENT
Start: 2023-05-01 | End: 2024-02-26

## 2023-05-01 RX ORDER — DEXAMETHASONE SODIUM PHOSPHATE 4 MG/ML
4 INJECTION, SOLUTION INTRA-ARTICULAR; INTRALESIONAL; INTRAMUSCULAR; INTRAVENOUS; SOFT TISSUE
Status: COMPLETED | OUTPATIENT
Start: 2023-05-01 | End: 2023-05-01

## 2023-05-01 RX ORDER — CLOTRIMAZOLE AND BETAMETHASONE DIPROPIONATE 10; .64 MG/G; MG/G
CREAM TOPICAL 2 TIMES DAILY
Qty: 15 G | Refills: 1 | Status: SHIPPED | OUTPATIENT
Start: 2023-05-01 | End: 2024-01-25

## 2023-05-01 RX ORDER — CEFUROXIME AXETIL 250 MG/1
250 TABLET ORAL EVERY 12 HOURS
Qty: 20 TABLET | Refills: 0 | Status: SHIPPED | OUTPATIENT
Start: 2023-05-01 | End: 2023-05-11

## 2023-05-01 RX ADMIN — DEXAMETHASONE SODIUM PHOSPHATE 4 MG: 4 INJECTION, SOLUTION INTRA-ARTICULAR; INTRALESIONAL; INTRAMUSCULAR; INTRAVENOUS; SOFT TISSUE at 07:05

## 2023-05-01 RX ADMIN — METHYLPREDNISOLONE ACETATE 40 MG: 80 INJECTION, SUSPENSION INTRA-ARTICULAR; INTRALESIONAL; INTRAMUSCULAR; SOFT TISSUE at 07:05

## 2023-05-01 RX ADMIN — CEFTRIAXONE 1 G: 1 INJECTION, POWDER, FOR SOLUTION INTRAMUSCULAR; INTRAVENOUS at 07:05

## 2023-05-01 NOTE — PROGRESS NOTES
Wilfred Menon MD   [facility]  [address] [place] [address]     PATIENT NAME: Quintin Sandoval  : 1965  DATE: 23  MRN: [id number]      Billing Provider: Wilfred Menon MD  Level of Service: TX OFFICE/OUTPT VISIT, EST, LEVL IV, 30-39 MIN  Patient PCP Information       Provider PCP Type    Wilfred Menon MD General            Reason for Visit / Chief Complaint: Hypertension (Fasting check up ), Hyperlipidemia, Diabetes, and Sinusitis (Sinus drainage, left facial tenderness, cough and nausea x 1 week)         History of Present Illness / Problem Focused Workflow     Quintin Sandoval presents to the clinic with Hypertension (Fasting check up ), Hyperlipidemia, Diabetes, and Sinusitis (Sinus drainage, left facial tenderness, cough and nausea x 1 week)     HPI    Review of Systems     Review of Systems   Constitutional:  Negative for activity change, appetite change, fatigue and fever.   HENT:  Positive for nasal congestion, rhinorrhea, sinus pressure/congestion and sore throat. Negative for ear pain and hearing loss.    Respiratory:  Positive for cough. Negative for chest tightness and shortness of breath.    Cardiovascular:  Negative for chest pain and palpitations.   Gastrointestinal:  Negative for abdominal pain and fecal incontinence.   Genitourinary:  Negative for bladder incontinence, difficulty urinating and erectile dysfunction.   Musculoskeletal:  Negative for arthralgias.   Integumentary:  Negative for rash.   Neurological:  Negative for dizziness and headaches.      Medical / Social / Family History     Past Medical History:   Diagnosis Date    Hyperlipidemia        Past Surgical History:   Procedure Laterality Date    ANGIOPLASTY      HERNIA REPAIR      REPAIR OF MENISCUS OF KNEE Right     SINUS SURGERY      x 4       Social History  Quintin Sandoval  reports that he has never smoked. He has quit using smokeless tobacco. He reports current  alcohol use. He reports that he does not use drugs.    Family History  Quintin Sandoval  family history includes Diabetes in his mother; Heart disease in his mother.    Medications and Allergies     Medications  Outpatient Medications Marked as Taking for the 5/1/23 encounter (Office Visit) with Wilfred Menon MD   Medication Sig Dispense Refill    aspirin (ECOTRIN) 81 MG EC tablet Take 81 mg by mouth once daily.      atorvastatin (LIPITOR) 80 MG tablet Take 80 mg by mouth once daily.      b complex vitamins tablet Take 1 tablet by mouth once daily.      carvediloL (COREG) 6.25 MG tablet Take 6.25 mg by mouth 2 (two) times daily.      fexofenadine (ALLEGRA) 180 MG tablet Take 180 mg by mouth once daily.      lisinopriL (PRINIVIL,ZESTRIL) 2.5 MG tablet Take 2.5 mg by mouth once daily.      [DISCONTINUED] fluticasone propionate (FLONASE) 50 mcg/actuation nasal spray 2 sprays by Each Nostril route once daily.      [DISCONTINUED] montelukast (SINGULAIR) 10 mg tablet Take 1 tablet (10 mg total) by mouth every evening. 90 tablet 1       Allergies  Review of patient's allergies indicates:  No Known Allergies    Physical Examination     Vitals:    05/01/23 0713   BP: 132/86   Pulse: 72   Temp: 98.1 °F (36.7 °C)     Physical Exam  Constitutional:       General: He is not in acute distress.     Appearance: He is not ill-appearing.   HENT:      Head: Normocephalic and atraumatic.      Right Ear: Tympanic membrane and ear canal normal.      Left Ear: Tympanic membrane and ear canal normal.      Nose: Congestion and rhinorrhea present.   Eyes:      Pupils: Pupils are equal, round, and reactive to light.   Cardiovascular:      Rate and Rhythm: Normal rate and regular rhythm.      Pulses: Normal pulses.      Heart sounds: No murmur heard.  Pulmonary:      Effort: No respiratory distress.      Breath sounds: No wheezing, rhonchi or rales.   Abdominal:      General: Bowel sounds are normal.      Palpations: Abdomen is soft.       Tenderness: There is no abdominal tenderness.      Hernia: No hernia is present.   Musculoskeletal:      Cervical back: Normal range of motion and neck supple.   Lymphadenopathy:      Cervical: No cervical adenopathy.   Skin:     General: Skin is warm and dry.   Neurological:      Mental Status: He is alert.   Psychiatric:         Behavior: Behavior normal.         Thought Content: Thought content normal.        Assessment and Plan (including Health Maintenance)   :    Plan:         Health Maintenance Due   Topic Date Due    Diabetes Urine Screening  Never done    Pneumococcal Vaccines (Age 0-64) (1 - PCV) Never done    Foot Exam  Never done    Eye Exam  Never done    HIV Screening  Never done    TETANUS VACCINE  Never done    Shingles Vaccine (1 of 2) Never done       Problem List Items Addressed This Visit    None  Visit Diagnoses       HTN (hypertension), benign    -  Primary    Relevant Orders    CBC Auto Differential (Completed)    Comprehensive Metabolic Panel (Completed)    Lipid Panel (Completed)    Hyperlipidemia, unspecified hyperlipidemia type        Relevant Orders    CBC Auto Differential (Completed)    Comprehensive Metabolic Panel (Completed)    Lipid Panel (Completed)    Hemoglobin A1c less than 7.0%        Relevant Orders    Hemoglobin A1C (Completed)    Type 2 diabetes mellitus without complication, without long-term current use of insulin        Relevant Orders    CBC Auto Differential (Completed)    Comprehensive Metabolic Panel (Completed)    Lipid Panel (Completed)    Hemoglobin A1C (Completed)    Encounter for hepatitis C screening test for low risk patient        Relevant Orders    Hepatitis C Antibody (Completed)          HTN (hypertension), benign  -     CBC Auto Differential; Future; Expected date: 05/01/2023  -     Comprehensive Metabolic Panel; Future; Expected date: 05/01/2023  -     Lipid Panel; Future; Expected date: 05/01/2023    Hyperlipidemia, unspecified hyperlipidemia  type  -     CBC Auto Differential; Future; Expected date: 05/01/2023  -     Comprehensive Metabolic Panel; Future; Expected date: 05/01/2023  -     Lipid Panel; Future; Expected date: 05/01/2023    Hemoglobin A1c less than 7.0%  -     Hemoglobin A1C; Future; Expected date: 05/01/2023    Type 2 diabetes mellitus without complication, without long-term current use of insulin  -     CBC Auto Differential; Future; Expected date: 05/01/2023  -     Comprehensive Metabolic Panel; Future; Expected date: 05/01/2023  -     Lipid Panel; Future; Expected date: 05/01/2023  -     Hemoglobin A1C; Future; Expected date: 05/01/2023    Encounter for hepatitis C screening test for low risk patient  -     Hepatitis C Antibody; Future; Expected date: 05/01/2023    Other orders  -     montelukast (SINGULAIR) 10 mg tablet; Take 1 tablet (10 mg total) by mouth every evening.  Dispense: 90 tablet; Refill: 1  -     fluticasone propionate (FLONASE) 50 mcg/actuation nasal spray; 2 sprays (100 mcg total) by Each Nostril route once daily.  Dispense: 16 g; Refill: 5  -     dexAMETHasone injection 4 mg  -     methylPREDNISolone acetate injection 40 mg  -     cefTRIAXone injection 1 g  -     cefUROXime (CEFTIN) 250 MG tablet; Take 1 tablet (250 mg total) by mouth every 12 (twelve) hours. for 10 days  Dispense: 20 tablet; Refill: 0  -     clotrimazole-betamethasone 1-0.05% (LOTRISONE) cream; Apply topically 2 (two) times daily.  Dispense: 15 g; Refill: 1       Health Maintenance Topics with due status: Not Due       Topic Last Completion Date    Low Dose Statin 05/01/2023    Lipid Panel 05/01/2023    Hemoglobin A1c 05/01/2023    Colorectal Cancer Screening Not Due       Procedures     No future appointments.     No follow-ups on file.       Signature:  Wilfred Menon MD  [facility]  [address] [place] [address]  [phone] Phone  [phone] Fax    Date of encounter: 5/1/23

## 2023-05-03 ENCOUNTER — PATIENT MESSAGE (OUTPATIENT)
Dept: ADMINISTRATIVE | Facility: HOSPITAL | Age: 58
End: 2023-05-03

## 2023-05-03 ENCOUNTER — PATIENT OUTREACH (OUTPATIENT)
Dept: ADMINISTRATIVE | Facility: HOSPITAL | Age: 58
End: 2023-05-03

## 2023-05-03 LAB — CRC RECOMMENDATION EXT: NORMAL

## 2023-05-03 NOTE — PROGRESS NOTES
Health Maintenance Due   Topic Date Due    Diabetes Urine Screening  Never done    Pneumococcal Vaccines (Age 0-64) (1 - PCV) Never done    Foot Exam  Never done    Eye Exam  Never done    HIV Screening  Never done    TETANUS VACCINE  Never done    Shingles Vaccine (1 of 2) Never done     05/03/2023   Phone call attempted, left message.   Patient Portal Message sent r/t DM Eye exam  Care Everywhere updates requested and reviewed.  Media reports reviewed.  [facility] and [facility] reviewed.  [facility] reviewed.   updated with external Colonoscopy Report  [facility] abstracted  Next appointment: No future appointment scheduled.  Patient is due for DM foot exam and DM urine screening

## 2023-06-13 ENCOUNTER — OFFICE VISIT (OUTPATIENT)
Dept: DERMATOLOGY | Facility: CLINIC | Age: 58
End: 2023-06-13
Payer: COMMERCIAL

## 2023-06-13 DIAGNOSIS — L82.1 SEBORRHEIC KERATOSES: ICD-10-CM

## 2023-06-13 DIAGNOSIS — D23.9 INTRADERMAL NEVUS: ICD-10-CM

## 2023-06-13 DIAGNOSIS — L73.9 FOLLICULITIS: Primary | ICD-10-CM

## 2023-06-13 PROCEDURE — 1159F MED LIST DOCD IN RCRD: CPT | Mod: ,,, | Performed by: STUDENT IN AN ORGANIZED HEALTH CARE EDUCATION/TRAINING PROGRAM

## 2023-06-13 PROCEDURE — 99204 OFFICE O/P NEW MOD 45 MIN: CPT | Mod: ,,, | Performed by: STUDENT IN AN ORGANIZED HEALTH CARE EDUCATION/TRAINING PROGRAM

## 2023-06-13 PROCEDURE — 1159F PR MEDICATION LIST DOCUMENTED IN MEDICAL RECORD: ICD-10-PCS | Mod: ,,, | Performed by: STUDENT IN AN ORGANIZED HEALTH CARE EDUCATION/TRAINING PROGRAM

## 2023-06-13 PROCEDURE — 3044F HG A1C LEVEL LT 7.0%: CPT | Mod: ,,, | Performed by: STUDENT IN AN ORGANIZED HEALTH CARE EDUCATION/TRAINING PROGRAM

## 2023-06-13 PROCEDURE — 4010F ACE/ARB THERAPY RXD/TAKEN: CPT | Mod: ,,, | Performed by: STUDENT IN AN ORGANIZED HEALTH CARE EDUCATION/TRAINING PROGRAM

## 2023-06-13 PROCEDURE — 3044F PR MOST RECENT HEMOGLOBIN A1C LEVEL <7.0%: ICD-10-PCS | Mod: ,,, | Performed by: STUDENT IN AN ORGANIZED HEALTH CARE EDUCATION/TRAINING PROGRAM

## 2023-06-13 PROCEDURE — 99204 PR OFFICE/OUTPT VISIT, NEW, LEVL IV, 45-59 MIN: ICD-10-PCS | Mod: ,,, | Performed by: STUDENT IN AN ORGANIZED HEALTH CARE EDUCATION/TRAINING PROGRAM

## 2023-06-13 PROCEDURE — 4010F PR ACE/ARB THEARPY RXD/TAKEN: ICD-10-PCS | Mod: ,,, | Performed by: STUDENT IN AN ORGANIZED HEALTH CARE EDUCATION/TRAINING PROGRAM

## 2023-06-13 RX ORDER — CLINDAMYCIN PHOSPHATE 11.9 MG/ML
SOLUTION TOPICAL 2 TIMES DAILY
Qty: 60 ML | Refills: 5 | Status: SHIPPED | OUTPATIENT
Start: 2023-06-13

## 2023-06-13 NOTE — PROGRESS NOTES
[facility]  Sam Rawls MD    [address], [place] [address]  [phone]    Fax: [phone]    Patient Name: Quintin Sandoval  Medical Record Number: [id number]  PCP: Wilfred Menon MD  Age: 57 y.o. : 1965  Contact: [phone] (home) [phone] (work)    CC: lesion on leg   History of Present Illness:     Quintin Sandoval is a 57 y.o.  male with no history of skin cancer who presents to clinic today for lesion on leg. It has been present 2 years. Symptoms include growth. He also complains of a rash in the scalp.     The patient has no other concerns today.    Review of Systems:     Unremarkable other than mentioned above.     Physical Exam:     General: Relaxed, oriented, alert    Skin examination of the scalp, face, neck, chest, back, abdomen, upper extremities and lower extremities were normal except for as listed below      Assessment and Plan:     1. Folliculitis  -follicular based pustules in scalp     Plan:   - clindamycin solution    Counseling  Patient instructed to apply antibacterial soap and/or benzoyl peroxide wash to affected areas in shower.  Expectations: Folliculitis is an infection of the hair follicle that can persist for several weeks.    2. Benign appearing melanocytic nevi   - no lesions appear clinically or dermatoscopically atypical enough to warrant biopsy at this time  - The patient was counseled on the ABCDE's of melanoma and on the importance of performing monthly self skin checks at home in between visits and will call our clinic with changes.  - discussed importance of sunscreen SPF 30 or greater     3. Seborrheic keratoses   - brown stuck on appearing papules/plaques  - patient educated on benign nature. No treatment necessary unless they become irritated or inflamed       Return to clinic in 1 year    AVS printed with patient instructions     Sam Rawls MD   Mohs Surgery/Dermatologic Oncology  Dermatology

## 2023-06-19 ENCOUNTER — OFFICE VISIT (OUTPATIENT)
Dept: FAMILY MEDICINE | Facility: CLINIC | Age: 58
End: 2023-06-19
Payer: COMMERCIAL

## 2023-06-19 VITALS
HEIGHT: 73 IN | OXYGEN SATURATION: 96 % | HEART RATE: 80 BPM | TEMPERATURE: 98 F | SYSTOLIC BLOOD PRESSURE: 116 MMHG | BODY MASS INDEX: 26.94 KG/M2 | WEIGHT: 203.25 LBS | DIASTOLIC BLOOD PRESSURE: 72 MMHG

## 2023-06-19 DIAGNOSIS — M79.10 MYALGIA: Primary | ICD-10-CM

## 2023-06-19 DIAGNOSIS — J01.00 ACUTE NON-RECURRENT MAXILLARY SINUSITIS: ICD-10-CM

## 2023-06-19 PROCEDURE — 3044F HG A1C LEVEL LT 7.0%: CPT | Mod: CPTII,,, | Performed by: FAMILY MEDICINE

## 2023-06-19 PROCEDURE — 3078F PR MOST RECENT DIASTOLIC BLOOD PRESSURE < 80 MM HG: ICD-10-PCS | Mod: CPTII,,, | Performed by: FAMILY MEDICINE

## 2023-06-19 PROCEDURE — 3074F SYST BP LT 130 MM HG: CPT | Mod: CPTII,,, | Performed by: FAMILY MEDICINE

## 2023-06-19 PROCEDURE — 1159F PR MEDICATION LIST DOCUMENTED IN MEDICAL RECORD: ICD-10-PCS | Mod: CPTII,,, | Performed by: FAMILY MEDICINE

## 2023-06-19 PROCEDURE — 3044F PR MOST RECENT HEMOGLOBIN A1C LEVEL <7.0%: ICD-10-PCS | Mod: CPTII,,, | Performed by: FAMILY MEDICINE

## 2023-06-19 PROCEDURE — 4010F ACE/ARB THERAPY RXD/TAKEN: CPT | Mod: CPTII,,, | Performed by: FAMILY MEDICINE

## 2023-06-19 PROCEDURE — 4010F PR ACE/ARB THEARPY RXD/TAKEN: ICD-10-PCS | Mod: CPTII,,, | Performed by: FAMILY MEDICINE

## 2023-06-19 PROCEDURE — 3078F DIAST BP <80 MM HG: CPT | Mod: CPTII,,, | Performed by: FAMILY MEDICINE

## 2023-06-19 PROCEDURE — 3008F BODY MASS INDEX DOCD: CPT | Mod: CPTII,,, | Performed by: FAMILY MEDICINE

## 2023-06-19 PROCEDURE — 3008F PR BODY MASS INDEX (BMI) DOCUMENTED: ICD-10-PCS | Mod: CPTII,,, | Performed by: FAMILY MEDICINE

## 2023-06-19 PROCEDURE — 99213 OFFICE O/P EST LOW 20 MIN: CPT | Mod: 25,,, | Performed by: FAMILY MEDICINE

## 2023-06-19 PROCEDURE — 3074F PR MOST RECENT SYSTOLIC BLOOD PRESSURE < 130 MM HG: ICD-10-PCS | Mod: CPTII,,, | Performed by: FAMILY MEDICINE

## 2023-06-19 PROCEDURE — 1159F MED LIST DOCD IN RCRD: CPT | Mod: CPTII,,, | Performed by: FAMILY MEDICINE

## 2023-06-19 PROCEDURE — 99213 PR OFFICE/OUTPT VISIT, EST, LEVL III, 20-29 MIN: ICD-10-PCS | Mod: 25,,, | Performed by: FAMILY MEDICINE

## 2023-06-19 PROCEDURE — 96372 PR INJECTION,THERAP/PROPH/DIAG2ST, IM OR SUBCUT: ICD-10-PCS | Mod: ,,, | Performed by: FAMILY MEDICINE

## 2023-06-19 PROCEDURE — 96372 THER/PROPH/DIAG INJ SC/IM: CPT | Mod: ,,, | Performed by: FAMILY MEDICINE

## 2023-06-19 RX ORDER — KETOROLAC TROMETHAMINE 30 MG/ML
60 INJECTION, SOLUTION INTRAMUSCULAR; INTRAVENOUS
Status: COMPLETED | OUTPATIENT
Start: 2023-06-19 | End: 2023-06-19

## 2023-06-19 RX ORDER — METHYLPREDNISOLONE ACETATE 80 MG/ML
40 INJECTION, SUSPENSION INTRA-ARTICULAR; INTRALESIONAL; INTRAMUSCULAR; SOFT TISSUE
Status: COMPLETED | OUTPATIENT
Start: 2023-06-19 | End: 2023-06-19

## 2023-06-19 RX ORDER — DEXAMETHASONE SODIUM PHOSPHATE 4 MG/ML
4 INJECTION, SOLUTION INTRA-ARTICULAR; INTRALESIONAL; INTRAMUSCULAR; INTRAVENOUS; SOFT TISSUE
Status: COMPLETED | OUTPATIENT
Start: 2023-06-19 | End: 2023-06-19

## 2023-06-19 RX ADMIN — METHYLPREDNISOLONE ACETATE 40 MG: 80 INJECTION, SUSPENSION INTRA-ARTICULAR; INTRALESIONAL; INTRAMUSCULAR; SOFT TISSUE at 02:06

## 2023-06-19 RX ADMIN — DEXAMETHASONE SODIUM PHOSPHATE 4 MG: 4 INJECTION, SOLUTION INTRA-ARTICULAR; INTRALESIONAL; INTRAMUSCULAR; INTRAVENOUS; SOFT TISSUE at 02:06

## 2023-06-19 RX ADMIN — KETOROLAC TROMETHAMINE 60 MG: 30 INJECTION, SOLUTION INTRAMUSCULAR; INTRAVENOUS at 02:06

## 2023-06-21 NOTE — PROGRESS NOTES
Wilfred Menon MD   [facility]  [address] [place] [address]     PATIENT NAME: Quintin Sandoval  : 1965  DATE: 23  MRN: [id number]      Billing Provider: Wilfred Menon MD  Level of Service:   Patient PCP Information       Provider PCP Type    Wilfred Menon MD General            Reason for Visit / Chief Complaint: Sinusitis (C/o headache, body aches, stomach cramps, cough and chills since Saturday night)         History of Present Illness / Problem Focused Workflow     Quintin Sandoval presents to the clinic with Sinusitis (C/o headache, body aches, stomach cramps, cough and chills since Saturday night)     HPI    Review of Systems     Review of Systems   Constitutional:  Negative for activity change, appetite change, fatigue and fever.   HENT:  Positive for rhinorrhea and sinus pressure/congestion. Negative for nasal congestion, ear pain, hearing loss and sore throat.    Respiratory:  Positive for shortness of breath. Negative for cough and chest tightness.    Cardiovascular:  Negative for chest pain and palpitations.   Gastrointestinal:  Negative for abdominal pain and fecal incontinence.   Genitourinary:  Negative for bladder incontinence, difficulty urinating and erectile dysfunction.   Musculoskeletal:  Negative for arthralgias.   Integumentary:  Negative for rash.   Neurological:  Negative for dizziness and headaches.      Medical / Social / Family History     Past Medical History:   Diagnosis Date    Hyperlipidemia        Past Surgical History:   Procedure Laterality Date    ANGIOPLASTY      HERNIA REPAIR      REPAIR OF MENISCUS OF KNEE Right     SINUS SURGERY      x 4       Social History  Quintin Sandoval  reports that he has never smoked. He has quit using smokeless tobacco. He reports current alcohol use. He reports that he does not use drugs.    Family History  Quintin Sandoval  family history includes Diabetes in his mother;  Heart disease in his mother.    Medications and Allergies     Medications  Outpatient Medications Marked as Taking for the 6/19/23 encounter (Office Visit) with Wilfred Menon MD   Medication Sig Dispense Refill    aspirin (ECOTRIN) 81 MG EC tablet Take 81 mg by mouth once daily.      atorvastatin (LIPITOR) 80 MG tablet Take 80 mg by mouth once daily.      b complex vitamins tablet Take 1 tablet by mouth once daily.      carvediloL (COREG) 6.25 MG tablet Take 6.25 mg by mouth 2 (two) times daily.      clindamycin (CLEOCIN T) 1 % external solution Apply topically 2 (two) times daily. 60 mL 5    clotrimazole-betamethasone 1-0.05% (LOTRISONE) cream Apply topically 2 (two) times daily. 15 g 1    fexofenadine (ALLEGRA) 180 MG tablet Take 180 mg by mouth once daily.      fluticasone propionate (FLONASE) 50 mcg/actuation nasal spray 2 sprays (100 mcg total) by Each Nostril route once daily. 16 g 5    lisinopriL (PRINIVIL,ZESTRIL) 2.5 MG tablet Take 2.5 mg by mouth once daily.      montelukast (SINGULAIR) 10 mg tablet Take 1 tablet (10 mg total) by mouth every evening. 90 tablet 1       Allergies  Review of patient's allergies indicates:  No Known Allergies    Physical Examination     Vitals:    06/19/23 1317   BP: 116/72   Pulse: 80   Temp: 98.3 °F (36.8 °C)     Physical Exam  Constitutional:       General: He is not in acute distress.     Appearance: He is not ill-appearing.   HENT:      Head: Normocephalic and atraumatic.      Right Ear: Tympanic membrane and ear canal normal.      Left Ear: Tympanic membrane and ear canal normal.      Nose: Congestion and rhinorrhea present.   Eyes:      Pupils: Pupils are equal, round, and reactive to light.   Cardiovascular:      Rate and Rhythm: Normal rate and regular rhythm.      Pulses: Normal pulses.      Heart sounds: No murmur heard.  Pulmonary:      Effort: No respiratory distress.      Breath sounds: No wheezing, rhonchi or rales.   Abdominal:      General:  Bowel sounds are normal.      Palpations: Abdomen is soft.      Tenderness: There is no abdominal tenderness.      Hernia: No hernia is present.   Musculoskeletal:      Cervical back: Normal range of motion and neck supple.   Lymphadenopathy:      Cervical: No cervical adenopathy.   Skin:     General: Skin is warm and dry.   Neurological:      Mental Status: He is alert.   Psychiatric:         Behavior: Behavior normal.         Thought Content: Thought content normal.        Assessment and Plan (including Health Maintenance)   :    Plan:         Health Maintenance Due   Topic Date Due    Diabetes Urine Screening  Never done    Pneumococcal Vaccines (Age 0-64) (1 - PCV) Never done    Foot Exam  Never done    Eye Exam  Never done    HIV Screening  Never done    TETANUS VACCINE  Never done    Shingles Vaccine (1 of 2) Never done       Problem List Items Addressed This Visit    None  Visit Diagnoses       Myalgia    -  Primary    Relevant Medications    dexAMETHasone injection 4 mg (Completed)    methylPREDNISolone acetate injection 40 mg (Completed)    ketorolac injection 60 mg (Completed)          Myalgia  -     dexAMETHasone injection 4 mg  -     methylPREDNISolone acetate injection 40 mg  -     ketorolac injection 60 mg       Health Maintenance Topics with due status: Not Due       Topic Last Completion Date    Colorectal Cancer Screening 01/28/2021    Lipid Panel 05/01/2023    Hemoglobin A1c 05/01/2023       Procedures     Future Appointments   Date Time Provider Department Center   6/13/2024  1:15 PM Barbara Rawls MD [facility]        No follow-ups on file.       Signature:  Wilfred Menon MD  [facility]  [address] [place] [address]  [phone] Phone  [phone] Fax    Date of encounter: 6/19/23

## 2023-07-19 ENCOUNTER — PATIENT MESSAGE (OUTPATIENT)
Dept: ADMINISTRATIVE | Facility: HOSPITAL | Age: 58
End: 2023-07-19

## 2023-09-11 ENCOUNTER — OFFICE VISIT (OUTPATIENT)
Dept: FAMILY MEDICINE | Facility: CLINIC | Age: 58
End: 2023-09-11
Payer: COMMERCIAL

## 2023-09-11 VITALS
OXYGEN SATURATION: 98 % | BODY MASS INDEX: 27.2 KG/M2 | HEART RATE: 75 BPM | SYSTOLIC BLOOD PRESSURE: 112 MMHG | HEIGHT: 73 IN | DIASTOLIC BLOOD PRESSURE: 86 MMHG | TEMPERATURE: 98 F | WEIGHT: 205.25 LBS

## 2023-09-11 DIAGNOSIS — R11.0 NAUSEA: ICD-10-CM

## 2023-09-11 DIAGNOSIS — J01.00 ACUTE NON-RECURRENT MAXILLARY SINUSITIS: Primary | ICD-10-CM

## 2023-09-11 PROCEDURE — 3008F PR BODY MASS INDEX (BMI) DOCUMENTED: ICD-10-PCS | Mod: CPTII,,, | Performed by: FAMILY MEDICINE

## 2023-09-11 PROCEDURE — 99213 OFFICE O/P EST LOW 20 MIN: CPT | Mod: 25,,, | Performed by: FAMILY MEDICINE

## 2023-09-11 PROCEDURE — 3079F DIAST BP 80-89 MM HG: CPT | Mod: CPTII,,, | Performed by: FAMILY MEDICINE

## 2023-09-11 PROCEDURE — 4010F PR ACE/ARB THEARPY RXD/TAKEN: ICD-10-PCS | Mod: CPTII,,, | Performed by: FAMILY MEDICINE

## 2023-09-11 PROCEDURE — 1159F MED LIST DOCD IN RCRD: CPT | Mod: CPTII,,, | Performed by: FAMILY MEDICINE

## 2023-09-11 PROCEDURE — 3074F SYST BP LT 130 MM HG: CPT | Mod: CPTII,,, | Performed by: FAMILY MEDICINE

## 2023-09-11 PROCEDURE — 3074F PR MOST RECENT SYSTOLIC BLOOD PRESSURE < 130 MM HG: ICD-10-PCS | Mod: CPTII,,, | Performed by: FAMILY MEDICINE

## 2023-09-11 PROCEDURE — 3079F PR MOST RECENT DIASTOLIC BLOOD PRESSURE 80-89 MM HG: ICD-10-PCS | Mod: CPTII,,, | Performed by: FAMILY MEDICINE

## 2023-09-11 PROCEDURE — 96372 PR INJECTION,THERAP/PROPH/DIAG2ST, IM OR SUBCUT: ICD-10-PCS | Mod: ,,, | Performed by: FAMILY MEDICINE

## 2023-09-11 PROCEDURE — 99213 PR OFFICE/OUTPT VISIT, EST, LEVL III, 20-29 MIN: ICD-10-PCS | Mod: 25,,, | Performed by: FAMILY MEDICINE

## 2023-09-11 PROCEDURE — 3044F HG A1C LEVEL LT 7.0%: CPT | Mod: CPTII,,, | Performed by: FAMILY MEDICINE

## 2023-09-11 PROCEDURE — 3008F BODY MASS INDEX DOCD: CPT | Mod: CPTII,,, | Performed by: FAMILY MEDICINE

## 2023-09-11 PROCEDURE — 3044F PR MOST RECENT HEMOGLOBIN A1C LEVEL <7.0%: ICD-10-PCS | Mod: CPTII,,, | Performed by: FAMILY MEDICINE

## 2023-09-11 PROCEDURE — 4010F ACE/ARB THERAPY RXD/TAKEN: CPT | Mod: CPTII,,, | Performed by: FAMILY MEDICINE

## 2023-09-11 PROCEDURE — 1159F PR MEDICATION LIST DOCUMENTED IN MEDICAL RECORD: ICD-10-PCS | Mod: CPTII,,, | Performed by: FAMILY MEDICINE

## 2023-09-11 PROCEDURE — 96372 THER/PROPH/DIAG INJ SC/IM: CPT | Mod: ,,, | Performed by: FAMILY MEDICINE

## 2023-09-11 RX ORDER — CEFTRIAXONE 1 G/1
1 INJECTION, POWDER, FOR SOLUTION INTRAMUSCULAR; INTRAVENOUS
Status: COMPLETED | OUTPATIENT
Start: 2023-09-11 | End: 2023-09-11

## 2023-09-11 RX ORDER — AZITHROMYCIN 250 MG/1
TABLET, FILM COATED ORAL
Qty: 6 TABLET | Refills: 0 | Status: SHIPPED | OUTPATIENT
Start: 2023-09-11 | End: 2023-09-16

## 2023-09-11 RX ORDER — METHYLPREDNISOLONE ACETATE 80 MG/ML
20 INJECTION, SUSPENSION INTRA-ARTICULAR; INTRALESIONAL; INTRAMUSCULAR; SOFT TISSUE
Status: COMPLETED | OUTPATIENT
Start: 2023-09-11 | End: 2023-09-11

## 2023-09-11 RX ORDER — PROMETHAZINE HYDROCHLORIDE 25 MG/1
25 TABLET ORAL EVERY 4 HOURS
Qty: 20 TABLET | Refills: 0 | Status: SHIPPED | OUTPATIENT
Start: 2023-09-11 | End: 2024-01-25

## 2023-09-11 RX ORDER — DEXAMETHASONE SODIUM PHOSPHATE 4 MG/ML
2 INJECTION, SOLUTION INTRA-ARTICULAR; INTRALESIONAL; INTRAMUSCULAR; INTRAVENOUS; SOFT TISSUE
Status: COMPLETED | OUTPATIENT
Start: 2023-09-11 | End: 2023-09-11

## 2023-09-11 RX ORDER — EZETIMIBE 10 MG/1
10 TABLET ORAL EVERY MORNING
COMMUNITY
Start: 2023-07-20

## 2023-09-11 RX ADMIN — DEXAMETHASONE SODIUM PHOSPHATE 2 MG: 4 INJECTION, SOLUTION INTRA-ARTICULAR; INTRALESIONAL; INTRAMUSCULAR; INTRAVENOUS; SOFT TISSUE at 01:09

## 2023-09-11 RX ADMIN — METHYLPREDNISOLONE ACETATE 20 MG: 80 INJECTION, SUSPENSION INTRA-ARTICULAR; INTRALESIONAL; INTRAMUSCULAR; SOFT TISSUE at 01:09

## 2023-09-11 RX ADMIN — CEFTRIAXONE 1 G: 1 INJECTION, POWDER, FOR SOLUTION INTRAMUSCULAR; INTRAVENOUS at 01:09

## 2023-09-11 NOTE — PROGRESS NOTES
Wilfred Menon MD   [facility]  [address] [place] [address]     PATIENT NAME: Quintin Sandoval  : 1965  DATE: 23  MRN: [id number]      Billing Provider: Wilfred Menon MD  Level of Service: ND OFFICE/OUTPT VISIT, EST, LEVL III, 20-29 MIN  Patient PCP Information       Provider PCP Type    Wilfred Menon MD General            Reason for Visit / Chief Complaint: Sinus Problem (C/o headache, postnasal drip, cough, queasy feeling/fluttering in chest and diarrhea - started Saturday evening)         History of Present Illness / Problem Focused Workflow     Quintin Sandoval presents to the clinic with Sinus Problem (C/o headache, postnasal drip, cough, queasy feeling/fluttering in chest and diarrhea - started Saturday evening)     HPI    Review of Systems     Review of Systems   Constitutional:  Negative for activity change, appetite change, fatigue and fever.   HENT:  Positive for nasal congestion, rhinorrhea, sinus pressure/congestion and sore throat. Negative for ear pain and hearing loss.    Respiratory:  Positive for cough. Negative for chest tightness and shortness of breath.    Cardiovascular:  Negative for chest pain and palpitations.   Gastrointestinal:  Negative for abdominal pain and fecal incontinence.   Genitourinary:  Negative for bladder incontinence, difficulty urinating and erectile dysfunction.   Musculoskeletal:  Negative for arthralgias.   Integumentary:  Negative for rash.   Neurological:  Negative for dizziness and headaches.        Medical / Social / Family History     Past Medical History:   Diagnosis Date    Hyperlipidemia        Past Surgical History:   Procedure Laterality Date    ANGIOPLASTY      HERNIA REPAIR      REPAIR OF MENISCUS OF KNEE Right     SINUS SURGERY      x 4       Social History  Quintin Sandoval  reports that he has never smoked. He has quit using smokeless tobacco. He reports current alcohol use. He reports  that he does not use drugs.    Family History  Quintin Sandoval  family history includes Diabetes in his mother; Heart disease in his mother.    Medications and Allergies     Medications  Outpatient Medications Marked as Taking for the 9/11/23 encounter (Office Visit) with Wilfred Menon MD   Medication Sig Dispense Refill    aspirin (ECOTRIN) 81 MG EC tablet Take 81 mg by mouth once daily.      atorvastatin (LIPITOR) 80 MG tablet Take 80 mg by mouth once daily.      b complex vitamins tablet Take 1 tablet by mouth once daily.      carvediloL (COREG) 6.25 MG tablet Take 6.25 mg by mouth 2 (two) times daily.      clindamycin (CLEOCIN T) 1 % external solution Apply topically 2 (two) times daily. 60 mL 5    clotrimazole-betamethasone 1-0.05% (LOTRISONE) cream Apply topically 2 (two) times daily. 15 g 1    ezetimibe (ZETIA) 10 mg tablet Take 10 mg by mouth every morning.      fexofenadine (ALLEGRA) 180 MG tablet Take 180 mg by mouth once daily.      fluticasone propionate (FLONASE) 50 mcg/actuation nasal spray 2 sprays (100 mcg total) by Each Nostril route once daily. 16 g 5    lisinopriL (PRINIVIL,ZESTRIL) 2.5 MG tablet Take 2.5 mg by mouth once daily.      montelukast (SINGULAIR) 10 mg tablet Take 1 tablet (10 mg total) by mouth every evening. 90 tablet 1     Current Facility-Administered Medications for the 9/11/23 encounter (Office Visit) with Wilfred Menon MD   Medication Dose Route Frequency Provider Last Rate Last Admin    [COMPLETED] cefTRIAXone injection 1 g  1 g Intramuscular 1 time in Clinic/HOD Wilfred Menon MD   1 g at 09/11/23 1348    [COMPLETED] dexAMETHasone injection 2 mg  2 mg Intramuscular 1 time in Clinic/HOD Wilfred Menon MD   2 mg at 09/11/23 1348    [COMPLETED] methylPREDNISolone acetate injection 20 mg  20 mg Intramuscular 1 time in Clinic/HOD Wilfred Menon MD   20 mg at 09/11/23 1348       Allergies  Review of patient's allergies indicates:  No Known  Allergies    Physical Examination     Vitals:    09/11/23 1317   BP: 112/86   Pulse: 75   Temp: 98.4 °F (36.9 °C)     Physical Exam  Constitutional:       General: He is not in acute distress.     Appearance: He is not ill-appearing.   HENT:      Head: Normocephalic and atraumatic.      Right Ear: Tympanic membrane and ear canal normal.      Left Ear: Tympanic membrane and ear canal normal.      Nose: Congestion and rhinorrhea present.   Eyes:      Pupils: Pupils are equal, round, and reactive to light.   Cardiovascular:      Rate and Rhythm: Normal rate and regular rhythm.      Pulses: Normal pulses.      Heart sounds: No murmur heard.  Pulmonary:      Effort: No respiratory distress.      Breath sounds: No wheezing, rhonchi or rales.   Abdominal:      General: Bowel sounds are normal.      Palpations: Abdomen is soft.      Tenderness: There is no abdominal tenderness.      Hernia: No hernia is present.   Musculoskeletal:      Cervical back: Normal range of motion and neck supple.   Lymphadenopathy:      Cervical: No cervical adenopathy.   Skin:     General: Skin is warm and dry.   Neurological:      Mental Status: He is alert.   Psychiatric:         Behavior: Behavior normal.         Thought Content: Thought content normal.          Assessment and Plan (including Health Maintenance)   :    Plan:         Health Maintenance Due   Topic Date Due    Diabetes Urine Screening  Never done    Pneumococcal Vaccines (Age 0-64) (1 - PCV) Never done    Foot Exam  Never done    Eye Exam  Never done    HIV Screening  Never done    TETANUS VACCINE  Never done    Shingles Vaccine (1 of 2) Never done    Influenza Vaccine (1) 09/01/2023       Problem List Items Addressed This Visit    None  Visit Diagnoses       Acute non-recurrent maxillary sinusitis    -  Primary    Nausea              Acute non-recurrent maxillary sinusitis    Nausea    Other orders  -     dexAMETHasone injection 2 mg  -     methylPREDNISolone acetate injection  20 mg  -     cefTRIAXone injection 1 g  -     azithromycin (Z-BRIANNA) 250 MG tablet; Take 2 tablets by mouth on day 1; Take 1 tablet by mouth on days 2-5  Dispense: 6 tablet; Refill: 0  -     promethazine (PHENERGAN) 25 MG tablet; Take 1 tablet (25 mg total) by mouth every 4 (four) hours.  Dispense: 20 tablet; Refill: 0       Health Maintenance Topics with due status: Not Due       Topic Last Completion Date    Colorectal Cancer Screening 01/28/2021    Lipid Panel 05/01/2023    Hemoglobin A1c 05/01/2023       Procedures     Future Appointments   Date Time Provider Department Center   6/13/2024  1:15 PM [place] [facility]        No follow-ups on file.       Signature:  Wilfred Menon MD  [facility]  [address] [facility] [place] [address]  [phone] Phone  [phone] Fax    Date of encounter: 9/11/23

## 2023-10-30 ENCOUNTER — OFFICE VISIT (OUTPATIENT)
Dept: FAMILY MEDICINE | Facility: CLINIC | Age: 58
End: 2023-10-30
Payer: COMMERCIAL

## 2023-10-30 VITALS
HEIGHT: 73 IN | TEMPERATURE: 98 F | DIASTOLIC BLOOD PRESSURE: 88 MMHG | WEIGHT: 214.63 LBS | BODY MASS INDEX: 28.44 KG/M2 | SYSTOLIC BLOOD PRESSURE: 118 MMHG | OXYGEN SATURATION: 98 % | HEART RATE: 91 BPM

## 2023-10-30 DIAGNOSIS — J01.00 ACUTE NON-RECURRENT MAXILLARY SINUSITIS: Primary | ICD-10-CM

## 2023-10-30 DIAGNOSIS — M54.50 ACUTE BILATERAL LOW BACK PAIN WITHOUT SCIATICA: ICD-10-CM

## 2023-10-30 DIAGNOSIS — M62.838 MUSCLE SPASM: ICD-10-CM

## 2023-10-30 PROCEDURE — 1159F PR MEDICATION LIST DOCUMENTED IN MEDICAL RECORD: ICD-10-PCS | Mod: CPTII,,, | Performed by: FAMILY MEDICINE

## 2023-10-30 PROCEDURE — 4010F ACE/ARB THERAPY RXD/TAKEN: CPT | Mod: CPTII,,, | Performed by: FAMILY MEDICINE

## 2023-10-30 PROCEDURE — 3074F PR MOST RECENT SYSTOLIC BLOOD PRESSURE < 130 MM HG: ICD-10-PCS | Mod: CPTII,,, | Performed by: FAMILY MEDICINE

## 2023-10-30 PROCEDURE — 3044F HG A1C LEVEL LT 7.0%: CPT | Mod: CPTII,,, | Performed by: FAMILY MEDICINE

## 2023-10-30 PROCEDURE — 3079F DIAST BP 80-89 MM HG: CPT | Mod: CPTII,,, | Performed by: FAMILY MEDICINE

## 2023-10-30 PROCEDURE — 3008F PR BODY MASS INDEX (BMI) DOCUMENTED: ICD-10-PCS | Mod: CPTII,,, | Performed by: FAMILY MEDICINE

## 2023-10-30 PROCEDURE — 3079F PR MOST RECENT DIASTOLIC BLOOD PRESSURE 80-89 MM HG: ICD-10-PCS | Mod: CPTII,,, | Performed by: FAMILY MEDICINE

## 2023-10-30 PROCEDURE — 3008F BODY MASS INDEX DOCD: CPT | Mod: CPTII,,, | Performed by: FAMILY MEDICINE

## 2023-10-30 PROCEDURE — 3044F PR MOST RECENT HEMOGLOBIN A1C LEVEL <7.0%: ICD-10-PCS | Mod: CPTII,,, | Performed by: FAMILY MEDICINE

## 2023-10-30 PROCEDURE — 96372 THER/PROPH/DIAG INJ SC/IM: CPT | Mod: ,,, | Performed by: FAMILY MEDICINE

## 2023-10-30 PROCEDURE — 3074F SYST BP LT 130 MM HG: CPT | Mod: CPTII,,, | Performed by: FAMILY MEDICINE

## 2023-10-30 PROCEDURE — 4010F PR ACE/ARB THEARPY RXD/TAKEN: ICD-10-PCS | Mod: CPTII,,, | Performed by: FAMILY MEDICINE

## 2023-10-30 PROCEDURE — 99213 OFFICE O/P EST LOW 20 MIN: CPT | Mod: 25,,, | Performed by: FAMILY MEDICINE

## 2023-10-30 PROCEDURE — 99213 PR OFFICE/OUTPT VISIT, EST, LEVL III, 20-29 MIN: ICD-10-PCS | Mod: 25,,, | Performed by: FAMILY MEDICINE

## 2023-10-30 PROCEDURE — 1159F MED LIST DOCD IN RCRD: CPT | Mod: CPTII,,, | Performed by: FAMILY MEDICINE

## 2023-10-30 PROCEDURE — 96372 PR INJECTION,THERAP/PROPH/DIAG2ST, IM OR SUBCUT: ICD-10-PCS | Mod: ,,, | Performed by: FAMILY MEDICINE

## 2023-10-30 RX ORDER — KETOROLAC TROMETHAMINE 30 MG/ML
60 INJECTION, SOLUTION INTRAMUSCULAR; INTRAVENOUS
Status: COMPLETED | OUTPATIENT
Start: 2023-10-30 | End: 2023-10-30

## 2023-10-30 RX ORDER — DEXAMETHASONE SODIUM PHOSPHATE 4 MG/ML
4 INJECTION, SOLUTION INTRA-ARTICULAR; INTRALESIONAL; INTRAMUSCULAR; INTRAVENOUS; SOFT TISSUE
Status: COMPLETED | OUTPATIENT
Start: 2023-10-30 | End: 2023-10-30

## 2023-10-30 RX ORDER — CYCLOBENZAPRINE HCL 10 MG
10 TABLET ORAL 3 TIMES DAILY PRN
Qty: 30 TABLET | Refills: 1 | Status: SHIPPED | OUTPATIENT
Start: 2023-10-30 | End: 2023-11-19

## 2023-10-30 RX ORDER — AZELASTINE 1 MG/ML
SPRAY, METERED NASAL
COMMUNITY

## 2023-10-30 RX ORDER — AZITHROMYCIN 250 MG/1
TABLET, FILM COATED ORAL
Qty: 6 TABLET | Refills: 0 | Status: SHIPPED | OUTPATIENT
Start: 2023-10-30 | End: 2024-01-25 | Stop reason: ALTCHOICE

## 2023-10-30 RX ORDER — METHYLPREDNISOLONE ACETATE 80 MG/ML
40 INJECTION, SUSPENSION INTRA-ARTICULAR; INTRALESIONAL; INTRAMUSCULAR; SOFT TISSUE
Status: COMPLETED | OUTPATIENT
Start: 2023-10-30 | End: 2023-10-30

## 2023-10-30 RX ADMIN — KETOROLAC TROMETHAMINE 60 MG: 30 INJECTION, SOLUTION INTRAMUSCULAR; INTRAVENOUS at 07:10

## 2023-10-30 RX ADMIN — DEXAMETHASONE SODIUM PHOSPHATE 4 MG: 4 INJECTION, SOLUTION INTRA-ARTICULAR; INTRALESIONAL; INTRAMUSCULAR; INTRAVENOUS; SOFT TISSUE at 07:10

## 2023-10-30 RX ADMIN — METHYLPREDNISOLONE ACETATE 40 MG: 80 INJECTION, SUSPENSION INTRA-ARTICULAR; INTRALESIONAL; INTRAMUSCULAR; SOFT TISSUE at 07:10

## 2023-10-30 NOTE — PROGRESS NOTES
Wilfred Menon MD   [facility]  [address] [place] [address]     PATIENT NAME: Quintin Sandoval  : 1965  DATE: 10/30/23  MRN: [id number]      Billing Provider: Wilfred Menon MD  Level of Service:   Patient PCP Information       Provider PCP Type    Wilfred Menon MD General            Reason for Visit / Chief Complaint: Sinusitis (Sneezing, congestion, headache, PN drainage, cough x5 days) and Back Pain (Patient states he sneezed real hard and pulled a muscle in his upper back )         History of Present Illness / Problem Focused Workflow     Quintin Sandoval presents to the clinic with Sinusitis (Sneezing, congestion, headache, PN drainage, cough x5 days) and Back Pain (Patient states he sneezed real hard and pulled a muscle in his upper back )     HPI    Review of Systems     Review of Systems   Constitutional:  Negative for activity change, appetite change, fatigue and fever.   HENT:  Positive for nasal congestion, rhinorrhea, sinus pressure/congestion and sore throat. Negative for ear pain and hearing loss.    Respiratory:  Positive for cough. Negative for chest tightness and shortness of breath.    Cardiovascular:  Negative for chest pain and palpitations.   Gastrointestinal:  Negative for abdominal pain and fecal incontinence.   Genitourinary:  Negative for bladder incontinence, difficulty urinating and erectile dysfunction.   Musculoskeletal:  Positive for back pain and myalgias. Negative for arthralgias.   Integumentary:  Negative for rash.   Neurological:  Negative for dizziness and headaches.        Medical / Social / Family History     Past Medical History:   Diagnosis Date    Hyperlipidemia        Past Surgical History:   Procedure Laterality Date    ANGIOPLASTY      HERNIA REPAIR      REPAIR OF MENISCUS OF KNEE Right     SINUS SURGERY      x 4       Social History  Quintin Sandoval  reports that he has never smoked. He has quit using  smokeless tobacco. He reports current alcohol use. He reports that he does not use drugs.    Family History  Quintin Sandoval  family history includes Diabetes in his mother; Heart disease in his mother.    Medications and Allergies     Medications  Outpatient Medications Marked as Taking for the 10/30/23 encounter (Office Visit) with Wilfred Menon MD   Medication Sig Dispense Refill    aspirin (ECOTRIN) 81 MG EC tablet Take 81 mg by mouth once daily.      atorvastatin (LIPITOR) 80 MG tablet Take 80 mg by mouth once daily.      azelastine (ASTELIN) 137 mcg (0.1 %) nasal spray Nasal for 30      b complex vitamins tablet Take 1 tablet by mouth once daily.      carvediloL (COREG) 6.25 MG tablet Take 6.25 mg by mouth 2 (two) times daily.      ezetimibe (ZETIA) 10 mg tablet Take 10 mg by mouth every morning.      fexofenadine (ALLEGRA) 180 MG tablet Take 180 mg by mouth once daily.      fluticasone propionate (FLONASE) 50 mcg/actuation nasal spray 2 sprays (100 mcg total) by Each Nostril route once daily. 16 g 5    lisinopriL (PRINIVIL,ZESTRIL) 2.5 MG tablet Take 2.5 mg by mouth once daily.      montelukast (SINGULAIR) 10 mg tablet Take 1 tablet (10 mg total) by mouth every evening. 90 tablet 1    promethazine (PHENERGAN) 25 MG tablet Take 1 tablet (25 mg total) by mouth every 4 (four) hours. 20 tablet 0       Allergies  Review of patient's allergies indicates:  No Known Allergies    Physical Examination     Vitals:    10/30/23 0721   BP: 118/88   Pulse: 91   Temp: 97.9 °F (36.6 °C)     Physical Exam  Constitutional:       General: He is not in acute distress.     Appearance: He is not ill-appearing.   HENT:      Head: Normocephalic and atraumatic.      Right Ear: Tympanic membrane and ear canal normal.      Left Ear: Tympanic membrane and ear canal normal.      Nose: Congestion and rhinorrhea present.      Mouth/Throat:      Pharynx: Posterior oropharyngeal erythema present.   Eyes:      Pupils: Pupils are  equal, round, and reactive to light.   Cardiovascular:      Rate and Rhythm: Normal rate and regular rhythm.      Pulses: Normal pulses.      Heart sounds: No murmur heard.  Pulmonary:      Effort: No respiratory distress.      Breath sounds: No wheezing, rhonchi or rales.   Abdominal:      General: Bowel sounds are normal.      Palpations: Abdomen is soft.      Tenderness: There is no abdominal tenderness.      Hernia: No hernia is present.   Musculoskeletal:         General: Tenderness present.      Cervical back: Normal range of motion and neck supple.      Thoracic back: Spasms and tenderness present. Decreased range of motion.      Lumbar back: Tenderness present. Decreased range of motion. Negative right straight leg raise test and negative left straight leg raise test.   Lymphadenopathy:      Cervical: No cervical adenopathy.   Skin:     General: Skin is warm and dry.   Neurological:      Mental Status: He is alert.   Psychiatric:         Behavior: Behavior normal.         Thought Content: Thought content normal.          Assessment and Plan (including Health Maintenance)   :    Plan:         Health Maintenance Due   Topic Date Due    Diabetes Urine Screening  Never done    Pneumococcal Vaccines (Age 0-64) (1 - PCV) Never done    Foot Exam  Never done    Eye Exam  Never done    HIV Screening  Never done    TETANUS VACCINE  Never done    Shingles Vaccine (1 of 2) Never done    Influenza Vaccine (1) 09/01/2023    Hemoglobin A1c  11/01/2023       Problem List Items Addressed This Visit    None    There are no diagnoses linked to this encounter.   Health Maintenance Topics with due status: Not Due       Topic Last Completion Date    Colorectal Cancer Screening 01/28/2021    Lipid Panel 05/01/2023       Procedures     Future Appointments   Date Time Provider Department Center   6/13/2024  1:15 PM [place] MD [facility]        No follow-ups on file.       Signature:  Wilfred Menon  [facility]  [address] [facility]   [place] [address]  [phone] Phone  [phone] Fax    Date of encounter: 10/30/23

## 2023-11-30 ENCOUNTER — OFFICE VISIT (OUTPATIENT)
Dept: FAMILY MEDICINE | Facility: CLINIC | Age: 58
End: 2023-11-30
Payer: COMMERCIAL

## 2023-11-30 VITALS
OXYGEN SATURATION: 98 % | WEIGHT: 220.25 LBS | HEIGHT: 73 IN | HEART RATE: 74 BPM | SYSTOLIC BLOOD PRESSURE: 128 MMHG | TEMPERATURE: 98 F | DIASTOLIC BLOOD PRESSURE: 86 MMHG | BODY MASS INDEX: 29.19 KG/M2

## 2023-11-30 DIAGNOSIS — R05.1 ACUTE COUGH: ICD-10-CM

## 2023-11-30 DIAGNOSIS — J01.00 ACUTE NON-RECURRENT MAXILLARY SINUSITIS: Primary | ICD-10-CM

## 2023-11-30 DIAGNOSIS — R05.9 COUGH, UNSPECIFIED TYPE: ICD-10-CM

## 2023-11-30 PROCEDURE — 3079F DIAST BP 80-89 MM HG: CPT | Mod: CPTII,,, | Performed by: FAMILY MEDICINE

## 2023-11-30 PROCEDURE — 3074F SYST BP LT 130 MM HG: CPT | Mod: CPTII,,, | Performed by: FAMILY MEDICINE

## 2023-11-30 PROCEDURE — 96372 THER/PROPH/DIAG INJ SC/IM: CPT | Mod: ,,, | Performed by: FAMILY MEDICINE

## 2023-11-30 PROCEDURE — 99213 OFFICE O/P EST LOW 20 MIN: CPT | Mod: 25,,, | Performed by: FAMILY MEDICINE

## 2023-11-30 PROCEDURE — 99213 PR OFFICE/OUTPT VISIT, EST, LEVL III, 20-29 MIN: ICD-10-PCS | Mod: 25,,, | Performed by: FAMILY MEDICINE

## 2023-11-30 PROCEDURE — 3008F PR BODY MASS INDEX (BMI) DOCUMENTED: ICD-10-PCS | Mod: CPTII,,, | Performed by: FAMILY MEDICINE

## 2023-11-30 PROCEDURE — 3008F BODY MASS INDEX DOCD: CPT | Mod: CPTII,,, | Performed by: FAMILY MEDICINE

## 2023-11-30 PROCEDURE — 3074F PR MOST RECENT SYSTOLIC BLOOD PRESSURE < 130 MM HG: ICD-10-PCS | Mod: CPTII,,, | Performed by: FAMILY MEDICINE

## 2023-11-30 PROCEDURE — 3044F HG A1C LEVEL LT 7.0%: CPT | Mod: CPTII,,, | Performed by: FAMILY MEDICINE

## 2023-11-30 PROCEDURE — 4010F ACE/ARB THERAPY RXD/TAKEN: CPT | Mod: CPTII,,, | Performed by: FAMILY MEDICINE

## 2023-11-30 PROCEDURE — 96372 PR INJECTION,THERAP/PROPH/DIAG2ST, IM OR SUBCUT: ICD-10-PCS | Mod: ,,, | Performed by: FAMILY MEDICINE

## 2023-11-30 PROCEDURE — 1159F PR MEDICATION LIST DOCUMENTED IN MEDICAL RECORD: ICD-10-PCS | Mod: CPTII,,, | Performed by: FAMILY MEDICINE

## 2023-11-30 PROCEDURE — 1159F MED LIST DOCD IN RCRD: CPT | Mod: CPTII,,, | Performed by: FAMILY MEDICINE

## 2023-11-30 PROCEDURE — 4010F PR ACE/ARB THEARPY RXD/TAKEN: ICD-10-PCS | Mod: CPTII,,, | Performed by: FAMILY MEDICINE

## 2023-11-30 PROCEDURE — 3079F PR MOST RECENT DIASTOLIC BLOOD PRESSURE 80-89 MM HG: ICD-10-PCS | Mod: CPTII,,, | Performed by: FAMILY MEDICINE

## 2023-11-30 PROCEDURE — 3044F PR MOST RECENT HEMOGLOBIN A1C LEVEL <7.0%: ICD-10-PCS | Mod: CPTII,,, | Performed by: FAMILY MEDICINE

## 2023-11-30 RX ORDER — CEFUROXIME AXETIL 250 MG/1
250 TABLET ORAL EVERY 12 HOURS
Qty: 20 TABLET | Refills: 0 | Status: SHIPPED | OUTPATIENT
Start: 2023-11-30 | End: 2024-01-25 | Stop reason: ALTCHOICE

## 2023-11-30 RX ORDER — DEXAMETHASONE SODIUM PHOSPHATE 4 MG/ML
4 INJECTION, SOLUTION INTRA-ARTICULAR; INTRALESIONAL; INTRAMUSCULAR; INTRAVENOUS; SOFT TISSUE
Status: COMPLETED | OUTPATIENT
Start: 2023-11-30 | End: 2023-11-30

## 2023-11-30 RX ORDER — BROMPHENIRAMINE MALEATE, PSEUDOEPHEDRINE HYDROCHLORIDE, AND DEXTROMETHORPHAN HYDROBROMIDE 2; 30; 10 MG/5ML; MG/5ML; MG/5ML
10 SYRUP ORAL EVERY 4 HOURS PRN
Qty: 240 ML | Refills: 0 | Status: SHIPPED | OUTPATIENT
Start: 2023-11-30 | End: 2023-12-10

## 2023-11-30 RX ORDER — CEFTRIAXONE 1 G/1
1 INJECTION, POWDER, FOR SOLUTION INTRAMUSCULAR; INTRAVENOUS
Status: COMPLETED | OUTPATIENT
Start: 2023-11-30 | End: 2023-11-30

## 2023-11-30 RX ORDER — METHYLPREDNISOLONE ACETATE 80 MG/ML
40 INJECTION, SUSPENSION INTRA-ARTICULAR; INTRALESIONAL; INTRAMUSCULAR; SOFT TISSUE
Status: COMPLETED | OUTPATIENT
Start: 2023-11-30 | End: 2023-11-30

## 2023-11-30 RX ADMIN — METHYLPREDNISOLONE ACETATE 40 MG: 80 INJECTION, SUSPENSION INTRA-ARTICULAR; INTRALESIONAL; INTRAMUSCULAR; SOFT TISSUE at 01:11

## 2023-11-30 RX ADMIN — CEFTRIAXONE 1 G: 1 INJECTION, POWDER, FOR SOLUTION INTRAMUSCULAR; INTRAVENOUS at 01:11

## 2023-11-30 RX ADMIN — DEXAMETHASONE SODIUM PHOSPHATE 4 MG: 4 INJECTION, SOLUTION INTRA-ARTICULAR; INTRALESIONAL; INTRAMUSCULAR; INTRAVENOUS; SOFT TISSUE at 01:11

## 2023-11-30 NOTE — PROGRESS NOTES
Wilfred Menon MD   [facility]  [address] [place] [address]     PATIENT NAME: Quintin Sandoval  : 1965  DATE: 23  MRN: [id number]      Billing Provider: Wilfred Menon MD  Level of Service:   Patient PCP Information       Provider PCP Type    Wilfred Menon MD General            Reason for Visit / Chief Complaint: Sinus Problem (Headache x 3 days. Cough. Hoarseness.)         History of Present Illness / Problem Focused Workflow     Quintin Sandoval presents to the clinic with Sinus Problem (Headache x 3 days. Cough. Hoarseness.)     HPI    Review of Systems     Review of Systems   Constitutional:  Negative for activity change, appetite change, fatigue and fever.   HENT:  Positive for nasal congestion, rhinorrhea, sinus pressure/congestion and sore throat. Negative for ear pain and hearing loss.    Respiratory:  Positive for cough. Negative for chest tightness and shortness of breath.    Cardiovascular:  Negative for chest pain and palpitations.   Gastrointestinal:  Negative for abdominal pain and fecal incontinence.   Genitourinary:  Negative for bladder incontinence, difficulty urinating and erectile dysfunction.   Musculoskeletal:  Negative for arthralgias.   Integumentary:  Negative for rash.   Neurological:  Negative for dizziness and headaches.        Medical / Social / Family History     Past Medical History:   Diagnosis Date    Hyperlipidemia        Past Surgical History:   Procedure Laterality Date    ANGIOPLASTY      HERNIA REPAIR      REPAIR OF MENISCUS OF KNEE Right     SINUS SURGERY      x 4       Social History  Quintin Sandoval  reports that he has never smoked. He has quit using smokeless tobacco. He reports current alcohol use. He reports that he does not use drugs.    Family History  Quintin Sandoval  family history includes Diabetes in his mother; Heart disease in his mother.    Medications and Allergies      Medications  Outpatient Medications Marked as Taking for the 11/30/23 encounter (Office Visit) with Wilfred Menon MD   Medication Sig Dispense Refill    aspirin (ECOTRIN) 81 MG EC tablet Take 81 mg by mouth once daily.      atorvastatin (LIPITOR) 80 MG tablet Take 80 mg by mouth once daily.      azelastine (ASTELIN) 137 mcg (0.1 %) nasal spray Nasal for 30      b complex vitamins tablet Take 1 tablet by mouth once daily.      carvediloL (COREG) 6.25 MG tablet Take 6.25 mg by mouth 2 (two) times daily.      ezetimibe (ZETIA) 10 mg tablet Take 10 mg by mouth every morning.      fexofenadine (ALLEGRA) 180 MG tablet Take 180 mg by mouth once daily.      fluticasone propionate (FLONASE) 50 mcg/actuation nasal spray 2 sprays (100 mcg total) by Each Nostril route once daily. 16 g 5    lisinopriL (PRINIVIL,ZESTRIL) 2.5 MG tablet Take 2.5 mg by mouth once daily.      montelukast (SINGULAIR) 10 mg tablet Take 1 tablet (10 mg total) by mouth every evening. 90 tablet 1    promethazine (PHENERGAN) 25 MG tablet Take 1 tablet (25 mg total) by mouth every 4 (four) hours. 20 tablet 0     Current Facility-Administered Medications for the 11/30/23 encounter (Office Visit) with Wilfred Menon MD   Medication Dose Route Frequency Provider Last Rate Last Admin    cefTRIAXone injection 1 g  1 g Intramuscular 1 time in Clinic/HOD Wilfred Menon MD        dexAMETHasone injection 4 mg  4 mg Intramuscular 1 time in Clinic/HOD Wilfred Menon MD        methylPREDNISolone acetate injection 40 mg  40 mg Intramuscular 1 time in Clinic/HOD Wilfred Menon MD           Allergies  Review of patient's allergies indicates:  No Known Allergies    Physical Examination     Vitals:    11/30/23 1320   BP: 128/86   Pulse: 74   Temp: 98.2 °F (36.8 °C)     Physical Exam  Constitutional:       General: He is not in acute distress.     Appearance: He is not ill-appearing.   HENT:      Head: Normocephalic and atraumatic.       Right Ear: Tympanic membrane and ear canal normal.      Left Ear: Tympanic membrane and ear canal normal.      Nose: Congestion and rhinorrhea present.   Eyes:      Pupils: Pupils are equal, round, and reactive to light.   Cardiovascular:      Rate and Rhythm: Normal rate and regular rhythm.      Pulses: Normal pulses.      Heart sounds: No murmur heard.  Pulmonary:      Effort: No respiratory distress.      Breath sounds: No wheezing, rhonchi or rales.   Abdominal:      General: Bowel sounds are normal.      Palpations: Abdomen is soft.      Tenderness: There is no abdominal tenderness.      Hernia: No hernia is present.   Musculoskeletal:      Cervical back: Normal range of motion and neck supple.   Lymphadenopathy:      Cervical: No cervical adenopathy.   Skin:     General: Skin is warm and dry.   Neurological:      Mental Status: He is alert.   Psychiatric:         Behavior: Behavior normal.         Thought Content: Thought content normal.          Assessment and Plan (including Health Maintenance)   :    Plan:         Health Maintenance Due   Topic Date Due    Diabetes Urine Screening  Never done    Pneumococcal Vaccines (Age 0-64) (1 - PCV) Never done    Foot Exam  Never done    Eye Exam  Never done    HIV Screening  Never done    TETANUS VACCINE  Never done    Shingles Vaccine (1 of 2) Never done    Influenza Vaccine (1) 09/01/2023    Hemoglobin A1c  11/01/2023       Problem List Items Addressed This Visit    None  Visit Diagnoses       Acute non-recurrent maxillary sinusitis    -  Primary    Relevant Medications    dexAMETHasone injection 4 mg (Start on 11/30/2023  1:45 PM)    methylPREDNISolone acetate injection 40 mg (Start on 11/30/2023  1:45 PM)    cefTRIAXone injection 1 g (Start on 11/30/2023  1:45 PM)    brompheniramine-pseudoeph-DM (BROMFED DM) 2-30-10 mg/5 mL Syrp    cefUROXime (CEFTIN) 250 MG tablet    Acute cough        Cough, unspecified type              Acute non-recurrent maxillary  sinusitis  -     dexAMETHasone injection 4 mg  -     methylPREDNISolone acetate injection 40 mg  -     cefTRIAXone injection 1 g  -     brompheniramine-pseudoeph-DM (BROMFED DM) 2-30-10 mg/5 mL Syrp; Take 10 mLs by mouth every 4 (four) hours as needed (cough).  Dispense: 240 mL; Refill: 0  -     cefUROXime (CEFTIN) 250 MG tablet; Take 1 tablet (250 mg total) by mouth every 12 (twelve) hours.  Dispense: 20 tablet; Refill: 0    Acute cough    Cough, unspecified type       Health Maintenance Topics with due status: Not Due       Topic Last Completion Date    Colorectal Cancer Screening 01/28/2021    Lipid Panel 05/01/2023       Procedures     Future Appointments   Date Time Provider Department Center   6/13/2024  1:15 PM Barbara Rawls MD [facility]        No follow-ups on file.       Signature:  Wilfred Menon MD  [facility]  [address] [place] [address]  [phone] Phone  [phone] Fax    Date of encounter: 11/30/23

## 2024-01-25 ENCOUNTER — OFFICE VISIT (OUTPATIENT)
Dept: FAMILY MEDICINE | Facility: CLINIC | Age: 59
End: 2024-01-25
Payer: COMMERCIAL

## 2024-01-25 VITALS
BODY MASS INDEX: 30.17 KG/M2 | HEART RATE: 99 BPM | TEMPERATURE: 98 F | WEIGHT: 227.63 LBS | OXYGEN SATURATION: 98 % | SYSTOLIC BLOOD PRESSURE: 134 MMHG | DIASTOLIC BLOOD PRESSURE: 86 MMHG | HEIGHT: 73 IN

## 2024-01-25 DIAGNOSIS — M17.12 PRIMARY OSTEOARTHRITIS OF LEFT KNEE: ICD-10-CM

## 2024-01-25 DIAGNOSIS — I10 HTN (HYPERTENSION), BENIGN: Primary | ICD-10-CM

## 2024-01-25 DIAGNOSIS — M79.10 MYALGIA: ICD-10-CM

## 2024-01-25 PROCEDURE — 99213 OFFICE O/P EST LOW 20 MIN: CPT | Mod: ,,, | Performed by: FAMILY MEDICINE

## 2024-01-25 PROCEDURE — 1159F MED LIST DOCD IN RCRD: CPT | Mod: CPTII,,, | Performed by: FAMILY MEDICINE

## 2024-01-25 PROCEDURE — 3079F DIAST BP 80-89 MM HG: CPT | Mod: CPTII,,, | Performed by: FAMILY MEDICINE

## 2024-01-25 PROCEDURE — 3075F SYST BP GE 130 - 139MM HG: CPT | Mod: CPTII,,, | Performed by: FAMILY MEDICINE

## 2024-01-25 PROCEDURE — 3008F BODY MASS INDEX DOCD: CPT | Mod: CPTII,,, | Performed by: FAMILY MEDICINE

## 2024-01-25 RX ORDER — AMOXICILLIN 500 MG/1
500 TABLET, FILM COATED ORAL EVERY 12 HOURS
Qty: 20 TABLET | Refills: 0 | Status: SHIPPED | OUTPATIENT
Start: 2024-01-25 | End: 2024-02-04

## 2024-01-25 RX ORDER — GLIPIZIDE 5 MG/1
5 TABLET ORAL
Qty: 60 TABLET | Refills: 11 | Status: SHIPPED | OUTPATIENT
Start: 2024-01-25 | End: 2025-01-24

## 2024-01-26 NOTE — PROGRESS NOTES
Wilfred Menon MD   [facility]  [address] [place] [address]     PATIENT NAME: Quintin Sandoval  : 1965  DATE: 24  MRN: [id number]      Billing Provider: Wilfred Menon MD  Level of Service:   Patient PCP Information       Provider PCP Type    Wilfred Menon MD General            Reason for Visit / Chief Complaint: Diabetes (Patient had work-up for Left Knee Total Knee Replacement on 23 that is  scheduled for  2024 with Dr. Gilmore at [facility]. Patient reports blood glucose-143,  A1C 7.1. Discuss starting DM medication.  Patient has been cleared by Cardiology for surgery. ) and Sinus Problem (PN drainage that seems to increase reflux symptoms x a few days. )         History of Present Illness / Problem Focused Workflow     Quintin Sandoval presents to the clinic with Diabetes (Patient had work-up for Left Knee Total Knee Replacement on 23 that is  scheduled for  2024 with Dr. Gilmore at [facility]. Patient reports blood glucose-143,  A1C 7.1. Discuss starting DM medication.  Patient has been cleared by Cardiology for surgery. ) and Sinus Problem (PN drainage that seems to increase reflux symptoms x a few days. )     HPI    Review of Systems     Review of Systems   Constitutional:  Negative for activity change, appetite change, fatigue and fever.   HENT:  Negative for nasal congestion, ear pain, hearing loss, sinus pressure/congestion and sore throat.    Respiratory:  Negative for cough, chest tightness and shortness of breath.    Cardiovascular:  Negative for chest pain and palpitations.   Gastrointestinal:  Negative for abdominal pain and fecal incontinence.   Genitourinary:  Negative for bladder incontinence, difficulty urinating and erectile dysfunction.   Musculoskeletal:  Negative for arthralgias.   Integumentary:  Negative for rash.   Neurological:  Negative for dizziness and headaches.        Medical / Social / Family History      Past Medical History:   Diagnosis Date    Hyperlipidemia        Past Surgical History:   Procedure Laterality Date    ANGIOPLASTY      HERNIA REPAIR      REPAIR OF MENISCUS OF KNEE Right     SINUS SURGERY      x 4       Social History  Quintin Sandoval  reports that he has never smoked. He has quit using smokeless tobacco. He reports current alcohol use. He reports that he does not use drugs.    Family History  Quintin Sandoval  family history includes Diabetes in his mother; Heart disease in his mother.    Medications and Allergies     Medications  Outpatient Medications Marked as Taking for the 1/25/24 encounter (Office Visit) with Wilfred Menon MD   Medication Sig Dispense Refill    aspirin (ECOTRIN) 81 MG EC tablet Take 81 mg by mouth once daily.      atorvastatin (LIPITOR) 80 MG tablet Take 80 mg by mouth once daily.      azelastine (ASTELIN) 137 mcg (0.1 %) nasal spray Nasal for 30      carvediloL (COREG) 6.25 MG tablet Take 6.25 mg by mouth 2 (two) times daily.      ezetimibe (ZETIA) 10 mg tablet Take 10 mg by mouth every morning.      fexofenadine (ALLEGRA) 180 MG tablet Take 180 mg by mouth once daily.      fluticasone propionate (FLONASE) 50 mcg/actuation nasal spray 2 sprays (100 mcg total) by Each Nostril route once daily. 16 g 5    lisinopriL (PRINIVIL,ZESTRIL) 2.5 MG tablet Take 2.5 mg by mouth once daily.      montelukast (SINGULAIR) 10 mg tablet Take 1 tablet (10 mg total) by mouth every evening. 90 tablet 1       Allergies  Review of patient's allergies indicates:  No Known Allergies    Physical Examination     Vitals:    01/25/24 0722   BP: 134/86   Pulse: 99   Temp: 98.3 °F (36.8 °C)     Physical Exam  Constitutional:       General: He is not in acute distress.     Appearance: He is not ill-appearing.   HENT:      Head: Normocephalic and atraumatic.      Right Ear: Tympanic membrane and ear canal normal.      Left Ear: Tympanic membrane and ear canal normal.      Nose: Nose normal.  No congestion or rhinorrhea.   Eyes:      Pupils: Pupils are equal, round, and reactive to light.   Cardiovascular:      Rate and Rhythm: Normal rate and regular rhythm.      Pulses: Normal pulses.      Heart sounds: No murmur heard.  Pulmonary:      Effort: No respiratory distress.      Breath sounds: No wheezing, rhonchi or rales.   Abdominal:      General: Bowel sounds are normal.      Palpations: Abdomen is soft.      Tenderness: There is no abdominal tenderness.      Hernia: No hernia is present.   Musculoskeletal:      Cervical back: Normal range of motion and neck supple.   Lymphadenopathy:      Cervical: No cervical adenopathy.   Skin:     General: Skin is warm and dry.   Neurological:      Mental Status: He is alert.   Psychiatric:         Behavior: Behavior normal.         Thought Content: Thought content normal.          Assessment and Plan (including Health Maintenance)   :    Plan:         Health Maintenance Due   Topic Date Due    Diabetes Urine Screening  Never done    Pneumococcal Vaccines (Age 0-64) (1 of 2 - PCV) Never done    Foot Exam  Never done    Eye Exam  Never done    HIV Screening  Never done    TETANUS VACCINE  Never done    Shingles Vaccine (1 of 2) Never done    Influenza Vaccine (1) 09/01/2023    Hemoglobin A1c  11/01/2023       Problem List Items Addressed This Visit    None    There are no diagnoses linked to this encounter.   Health Maintenance Topics with due status: Not Due       Topic Last Completion Date    Colorectal Cancer Screening 01/28/2021    Lipid Panel 05/01/2023       Procedures     Future Appointments   Date Time Provider Department Center   6/13/2024  1:15 PM Barbara Rawls MD [facility]        No follow-ups on file.       Signature:  Wilfred Menon MD  [facility]  [address] [place] [address]  [phone] Phone  [phone] Fax    Date of encounter: 1/25/24

## 2024-01-31 NOTE — TELEPHONE ENCOUNTER
----- Message from Gely Mcgraw sent at 1/31/2024  9:59 AM CST -----  Needing to speak with a nurse regarding blood sugar med and upcoming surgery. Call back # [phone]

## 2024-01-31 NOTE — TELEPHONE ENCOUNTER
Patient concerned with taking glipizide with medication after knee surgery on 2/11/24 if they prescribe an antiinflammatory. Notified patient per Dr Menon that he was okay with patient taking both. Patient verbalized understanding. Instructed patient to call or come into clinic with any concerns. Patient asking for glucometer, strips, and lancets to be sent into pharmacy. Notified patient it was sent. Patient verbalized understanding.

## 2024-02-01 RX ORDER — LANCETS
EACH MISCELLANEOUS
Qty: 100 EACH | Refills: 2 | Status: SHIPPED | OUTPATIENT
Start: 2024-02-01

## 2024-02-01 RX ORDER — INSULIN PUMP SYRINGE, 3 ML
EACH MISCELLANEOUS
Qty: 1 EACH | Refills: 0 | Status: SHIPPED | OUTPATIENT
Start: 2024-02-01 | End: 2025-01-30

## 2024-02-15 ENCOUNTER — CLINICAL SUPPORT (OUTPATIENT)
Dept: REHABILITATION | Facility: HOSPITAL | Age: 59
End: 2024-02-15
Payer: COMMERCIAL

## 2024-02-15 DIAGNOSIS — M17.12 PRIMARY OSTEOARTHRITIS OF LEFT KNEE: ICD-10-CM

## 2024-02-15 DIAGNOSIS — M17.12 PRIMARY OSTEOARTHRITIS OF LEFT KNEE: Primary | ICD-10-CM

## 2024-02-15 PROCEDURE — 97161 PT EVAL LOW COMPLEX 20 MIN: CPT

## 2024-02-15 PROCEDURE — 97110 THERAPEUTIC EXERCISES: CPT

## 2024-02-15 PROCEDURE — 97016 VASOPNEUMATIC DEVICE THERAPY: CPT

## 2024-02-16 ENCOUNTER — CLINICAL SUPPORT (OUTPATIENT)
Dept: REHABILITATION | Facility: HOSPITAL | Age: 59
End: 2024-02-16
Payer: COMMERCIAL

## 2024-02-16 DIAGNOSIS — M17.12 PRIMARY OSTEOARTHRITIS OF LEFT KNEE: Primary | ICD-10-CM

## 2024-02-16 PROCEDURE — 97110 THERAPEUTIC EXERCISES: CPT | Mod: CQ

## 2024-02-16 PROCEDURE — 97014 ELECTRIC STIMULATION THERAPY: CPT | Mod: CQ

## 2024-02-16 PROCEDURE — 97016 VASOPNEUMATIC DEVICE THERAPY: CPT | Mod: CQ

## 2024-02-16 NOTE — PROGRESS NOTES
"[facility]   Physical Therapy Treatment Note      Name: Quintin Sandoval  Clinic Number: [id number]    Therapy Diagnosis:        Encounter Diagnosis   Name Primary?    Primary osteoarthritis of left knee          Physician: Jairo Gilmore MD     Physician Orders: PT Eval and Treat   Medical Diagnosis from Referral: L TKR   Evaluation Date: 2/15/2024  Authorization Period Expiration: 2/14/2025  Plan of Care Expiration: 3/29/2024  Progress Note Due: 3/29/2024  Date of Surgery: 2/12/2024  Visit # / Visits authorized: 2/18   FOTO: to be completed on visit 6      Precautions: Standard      Time In: 8:49  Time Out: 9:30  Total Billable Time: 41 minutes   Visit Date: 2/16/2024      PTA Visit #: 1/5       Subjective     Patient reports:  Stiffness and soreness in Left knee.  Ambulates with RW  .  He was compliant with home exercise program.  Response to previous treatment: Soreness in Left knee post treatment  Functional change: Early in Plan of Care     Pain: 4/10  Location: left knee      Objective      Objective Measures updated at progress report unless specified.     Treatment     Leno received the treatments listed below:      therapeutic exercises to develop strength, endurance, ROM, and flexibility for 29 minutes including:     Ther- Ex Reps   Nustep  8 minutes *    Wedge --------   Hamstring Stretch 3 x 20"    Heelslides (seated)  3 x 10 (70  degrees) *   LAQ's 2 x 10    Hamstring Curls     Quad sets  3 x 10    SAQ's 2 x 10    Straight Leg Raises 2 x 10 *    Ankle Pumps  2 x 10    Glute Sets     Heel Slides (supine)  2 x 10 (72 degrees) *         Ther-Act     Heel Raises     Mini Squats     3 way hip     Sit <> stands      Forward Step ups     Lateral Step ups                                         supervised modalities after being cleared for contradictions: Vasopneumatic device applied to L knee with elevation above heart level on minimal pressure for 10 minutes.     Patient " Education and Home Exercises       Education provided:   - Plan of Care, Home exercise program, Delayed onset muscle soreness     Written Home Exercises Provided: Patient instructed to cont prior HEP. Exercises were reviewed and Leno was able to demonstrate them prior to the end of the session.  Leno demonstrated fair  understanding of the education provided. See Electronic Medical Record under Patient Instructions for exercises provided during therapy sessions    Assessment     Quintin is a 58 y.o. male referred to outpatient Physical Therapy with a medical diagnosis of L TKA. Patient presents with L knee pain, decreased L knee active range of motion, L LE muscle strength, decreased L LE motor control and impaired gait mechanics. Patient's impairments are currently limiting his independence with functional mobility tasks and his activity tolerance.  LPTA initiated physical therapy treatment session with set up on Nustep for warm up and to increase L knee ROM.  LPTA instructed patient in Therapeutic exercises and increased reps and added exercises as tolerated by patient.  Patient requires increased effort to progress through completion of Therapeutic exercises and requires physical assistance with 50% of exercises.  Noted increased active range of motion Left knee flexion at end of treatment session.  No adverse effects  noted.     Leno Is progressing well towards his goals.   Patient prognosis is Good.     Patient will continue to benefit from skilled outpatient physical therapy to address the deficits listed in the problem list box on initial evaluation, provide pt/family education and to maximize pt's level of independence in the home and community environment.     Patient's spiritual, cultural and educational needs considered and pt agreeable to plan of care and goals.     Anticipated barriers to physical therapy: compliance with Home exercise program     Goals: Short Term Goals: 3 weeks   Patient will  independently complete Home exercise program with correct form.   Patient will report decreased pain to less than or equal to 2/10 for improved quality of life.   Patient will independently transfer sit to stand without UE use for increased independence with functional transfers.  Patient will ambulate community distances without assistive device for return to PLOF.       Long Term Goals: 6 weeks   Pt will increase L knee flexion to 120 degree, knee extension to 0 degrees, and quad lag to 0 degrees to allow patient normal gait pattern.  Pt will increase L knee strength to 4/5 to allow patient to safely return to prior level of function   Patient will ambulate with increased gait speed to greater than or equal to 3.25 feet per second for increased safety with community ambulation   Patient will negotiate up and down stairs with a reciprocal pattern in preparation for return to stair climbing at work.   Patient will have increased FOTO score to greater than or equal to 50% indicating increased function.   Patient will complete 5 x sit<> less than or equal to 12 seconds indicating increased LE power for functional transfers.     Plan     Plan of care Certification: 2/15/2024 to 3/29/2024.  Outpatient Physical Therapy 3 times weekly for 6 weeks to include the following interventions: Electrical Stimulation unattended, Gait Training, Manual Therapy, Moist Heat/ Ice, Neuromuscular Re-ed, Patient Education, Therapeutic Activities, and Therapeutic Exercise.     Continue Plan of Care per PT orer to progress patient toward rehab goals.     Saritha White, PTA  2/16/2024

## 2024-02-16 NOTE — PLAN OF CARE
"    [facility]   Physical Therapy Initial Evaluation      Name: Quintin Sandoval  Clinic Number: [id number]    Therapy Diagnosis:   Encounter Diagnosis   Name Primary?    Primary osteoarthritis of left knee         Physician: Jairo Gilmore MD    Physician Orders: PT Eval and Treat   Medical Diagnosis from Referral: L TKR   Evaluation Date: 2/15/2024  Authorization Period Expiration: 2/14/2025  Plan of Care Expiration: 3/29/2024  Progress Note Due: 3/29/2024  Date of Surgery: 2/12/2024  Visit # / Visits authorized: 1/18   FOTO: to be completed on visit 6     Precautions: Standard     Time In: 12:30  Time Out: 13:28  Total Billable Time: 58 minutes    Subjective     Date of onset: 2/12/2024     History of current condition - Leno reports: chronic history of L knee pain for approximately two years that had begun to limit his mobility and activity tolerance. Patient reports no therapy since discharging from hospital on Tuesday. Patient reports requiring assistance for supine to sit transfers, car transfers, and donning and doffing shoes and socks at this time. Patient referred to PT for conservative post-op rehabilitation.     Falls: None     Imaging: X-ray prior to surgery     Prior Therapy: None   Social History:  lives with their spouse  Occupation: [employer]   Prior Level of Function: Independent   Current Level of Function: Mod assist for ADLs and dressing     Pain:  Current 5/10, worst 8/10, best 0/10   Location: left knee    Description: Aching, Dull, Burning, Throbbing, and Sharp  Aggravating Factors: Bending and Getting out of bed/chair  Easing Factors: pain medication and ice    Patients goals: "get back to normal"      Medical History:   Past Medical History:   Diagnosis Date    Hyperlipidemia        Surgical History:   Quintin Sandovla  has a past surgical history that includes Sinus surgery; Repair of meniscus of knee (Right); Hernia repair; and " Angioplasty.    Medications:   Quintin has a current medication list which includes the following prescription(s): aspirin, atorvastatin, azelastine, b complex vitamins, blood sugar diagnostic, blood-glucose meter, carvedilol, clindamycin, ezetimibe, fexofenadine, fluticasone propionate, glipizide, lancets, lisinopril, and montelukast.    Allergies:   Review of patient's allergies indicates:  No Known Allergies     Objective    Incisions: Patient's incision appears to be healing well with no signs of drainage or infection.   Girth Measurements: Right 38.5 cm at midptella Left 43 cm at midpatella    Range of motion:  Motion Right Left    Hip flexion  WITHIN FUNCTIONAL LIMITS  WITHIN FUNCTIONAL LIMITS   Hip extension  WITHIN FUNCTIONAL LIMITS  WITHIN FUNCTIONAL LIMITS   Hip abduction  WITHIN FUNCTIONAL LIMITS  WITHIN FUNCTIONAL LIMITS   Hip adduction  WITHIN FUNCTIONAL LIMITS  WITHIN FUNCTIONAL LIMITS   Knee extension  WITHIN FUNCTIONAL LIMITS  18 degrees from zero    Knee flexion  WITHIN FUNCTIONAL LIMITS  58 degrees    Ankle DF  WITHIN FUNCTIONAL LIMITS  WITHIN FUNCTIONAL LIMITS   Ankle PF  WITHIN FUNCTIONAL LIMITS  WITHIN FUNCTIONAL LIMITS   Ankle Inversion  WITHIN FUNCTIONAL LIMITS  WITHIN FUNCTIONAL LIMITS   Ankle Eversion  WITHIN FUNCTIONAL LIMITS  WITHIN FUNCTIONAL LIMITS       Manual muscle test   Muscle Right  Left    Hip flexion  MMT strength: 5/5  MMT strength: 3-/5   Hip extension  MMT strength: 5/5  MMT strength: 3-/5   Hip abduction  MMT strength: 5/5  MMT strength: 3-/5   Hip adduction  MMT strength: 5/5  MMT strength: 3-/5   Knee extension  MMT strength: 4/5  MMT strength: 2-/5   Knee flexion  MMT strength: 4/5  MMT strength: 2-/5   Ankle DF  MMT strength: 5/5  MMT strength: 4/5   Ankle PF  MMT strength: 5/5  MMT strength: 4/5   Ankle inversion  MMT strength: 5/5  MMT strength: 4/5   Ankle eversion  MMT strength: 4+/5  MMT strength: 4/5       Gait:  Weight bearing precautions: WBAT  Assistive device:  "rolling walker  Ambulation deviations: L foot flat contact, decreased L stance, decreased step through gait pattern  Stairs: patient negotiates up and down stairs using B handrails with non-reciprocal gait pattern   Gait speed: 0.76 feet per second     Clinical Special Tests:  5 x sit<>stand: unable to complete without UE use at this time.     Intake Outcome Measure for FOTO Survey    Therapist reviewed FOTO scores for Quintin Sandoval on 2/15/2024.   FOTO report - see Media section or FOTO account episode details.    Intake Score: 29%            Treatment     Total Treatment time (time-based codes) separate from Evaluation: 44 minutes     Leno received the treatments listed below:      therapeutic exercises to develop strength, endurance, ROM, and flexibility for 29 minutes including:    Ther- Ex Reps   Nustep    Wedge    Hamstring Stretch 2 x 20"    Heelslides (seated)  2 x 10 (60 degrees)   LAQ's 2 x 10    Hamstring Curls    Quad sets  3 x 10    SAQ's 2 x 10    Straight Leg Raises    Ankle Pumps  2 x 10    Glute Sets  2 x 10    Heel Slides (supine)  2 x 10 (58 degrees)       Ther-Act    Heel Raises    Mini Squats    3 way hip    Sit <> stands     Forward Step ups    Lateral Step ups                             supervised modalities after being cleared for contradictions: Vasopneumatic device applied to L knee with elevation above heart level on minimal pressure for 15 minutes.     Patient Education and Home Exercises     Education provided:   - eval findings, plan of care, and Home exercise program     Written Home Exercises Provided: yes. Exercises were reviewed and Leno was able to demonstrate them prior to the end of the session.  Leno demonstrated good  understanding of the education provided. See EMR under Patient Instructions for exercises provided during therapy sessions.    Assessment     Quintin is a 58 y.o. male referred to outpatient Physical Therapy with a medical diagnosis of L TKA. Patient presents " "with L knee pain, decreased L knee active range of motion, L LE muscle strength, decreased L LE motor control and impaired gait mechanics. Patient's impairments are currently limiting his independence with functional mobility tasks and his activity tolerance.     Patient progressed through exercises with increased time and effort due to discomfort with L knee flexion and decreased L quad activation. Patient initially required min A for Long Arc Quad, Short Arc Quad, and heel slide tasks due to L knee stiffness. Patient reported increased L knee pain with increased L knee flexion. Patient tolerated vasopneumatic device without adverse effects and stated "that actually helped me to be able to relax my leg."    Patient prognosis is Good.   Patient will benefit from skilled outpatient Physical Therapy to address the deficits stated above and in the chart below, provide patient /family education, and to maximize patientt's level of independence.     Plan of care discussed with patient: Yes  Patient's spiritual, cultural and educational needs considered and patient is agreeable to the plan of care and goals as stated below:     Anticipated Barriers for therapy: compliance with Home exercise program     Medical Necessity is demonstrated by the following  History  Co-morbidities and personal factors that may impact the plan of care [x] LOW: no personal factors / co-morbidities  [] MODERATE: 1-2 personal factors / co-morbidities  [] HIGH: 3+ personal factors / co-morbidities    Moderate / High Support Documentation:   Co-morbidities affecting plan of care: NA    Personal Factors:   no deficits     Examination  Body Structures and Functions, activity limitations and participation restrictions that may impact the plan of care [x] LOW: addressing 1-2 elements  [] MODERATE: 3+ elements  [] HIGH: 4+ elements (please support below)    Moderate / High Support Documentation: NA     Clinical Presentation [x] LOW: stable  [] MODERATE: " Evolving  [] HIGH: Unstable     Decision Making/ Complexity Score: low       Goals:  Short Term Goals: 3 weeks   Patient will independently complete Home exercise program with correct form.   Patient will report decreased pain to less than or equal to 2/10 for improved quality of life.   Patient will independently transfer sit to stand without UE use for increased independence with functional transfers.  Patient will ambulate community distances without assistive device for return to PLOF.      Long Term Goals: 6 weeks   Pt will increase L knee flexion to 120 degree, knee extension to 0 degrees, and quad lag to 0 degrees to allow patient normal gait pattern.  Pt will increase L knee strength to 4/5 to allow patient to safely return to prior level of function   Patient will ambulate with increased gait speed to greater than or equal to 3.25 feet per second for increased safety with community ambulation   Patient will negotiate up and down stairs with a reciprocal pattern in preparation for return to stair climbing at work.   Patient will have increased FOTO score to greater than or equal to 50% indicating increased function.   Patient will complete 5 x sit<> less than or equal to 12 seconds indicating increased LE power for functional transfers.     Plan     Plan of care Certification: 2/15/2024 to 3/29/2024.    Outpatient Physical Therapy 3 times weekly for 6 weeks to include the following interventions: Electrical Stimulation unattended, Gait Training, Manual Therapy, Moist Heat/ Ice, Neuromuscular Re-ed, Patient Education, Therapeutic Activities, and Therapeutic Exercise.     Francisco Bell, PT, DPT         Physician's Signature: _________________________________________ Date: ________________

## 2024-02-20 ENCOUNTER — CLINICAL SUPPORT (OUTPATIENT)
Dept: REHABILITATION | Facility: HOSPITAL | Age: 59
End: 2024-02-20
Payer: COMMERCIAL

## 2024-02-20 DIAGNOSIS — M17.12 PRIMARY OSTEOARTHRITIS OF LEFT KNEE: Primary | ICD-10-CM

## 2024-02-20 PROBLEM — M25.562 ACUTE PAIN OF LEFT KNEE: Status: ACTIVE | Noted: 2023-03-07

## 2024-02-20 PROCEDURE — 97110 THERAPEUTIC EXERCISES: CPT

## 2024-02-20 PROCEDURE — 97016 VASOPNEUMATIC DEVICE THERAPY: CPT

## 2024-02-20 PROCEDURE — 97140 MANUAL THERAPY 1/> REGIONS: CPT

## 2024-02-20 NOTE — PROGRESS NOTES
"[facility]   Physical Therapy Treatment Note      Name: Quintin Sandoval  Clinic Number: [id number]    Therapy Diagnosis:        Encounter Diagnosis   Name Primary?    Primary osteoarthritis of left knee          Physician: Jairo Gilmore MD     Physician Orders: PT Eval and Treat   Medical Diagnosis from Referral: L TKR   Evaluation Date: 2/15/2024  Authorization Period Expiration: 2/14/2025  Plan of Care Expiration: 3/29/2024  Progress Note Due: 3/29/2024  Date of Surgery: 2/12/2024  Visit # / Visits authorized: 3/18   FOTO: to be completed on visit 6      Precautions: Standard      Time In: 7:58  Time Out: 9:12  Total Billable Time:  74 minutes   Visit Date: 2/20/2024      PTA Visit #: 0/5   Subjective     Patient reports:  "My knee is doing pretty good. I had a catch in my back this morning that was a stabbing pain."   .  He was compliant with home exercise program.  Response to previous treatment: Soreness in Left knee post treatment  Functional change: Early in Plan of Care     Pain: 3/10  Location: left knee      Objective      Objective Measures updated at progress report unless specified.     Treatment     Leno received the treatments listed below:      therapeutic exercises to develop strength, endurance, ROM, and flexibility for  minutes including:     Ther- Ex Reps   Nustep  8 minutes    Wedge 2 minutes    Hamstring Stretch 3 x 20"    Heelslides (seated)  3 x 10 (75  degrees)    LAQ's 2 x 10    Hamstring Curls     Quad sets  3 x 10    SAQ's 2 x 10    Straight Leg Raises 2 x 10    Ankle Pumps  2 x 10    Glute Sets     Heel Slides (supine)  2 x 10 (80 degrees)          Ther-Act     Heel Raises     Mini Squats     3 way hip     Sit <> stands      Forward Step ups     Lateral Step ups                                      Manual Therapy: PT completed L SI joint mobilizations, L innominate upslip correction, L quadratus and piriformis muscle releases to decrease tissue " tightness and pain and improve joint mobility.      supervised modalities after being cleared for contradictions: Vasopneumatic device applied to L knee with elevation above heart level on minimal pressure for 15 minutes.   PT also applied biphasic ESTIM to patient's B quadratus lumborum and piriformis muscles to decrease tightness and pain in low back. Patient's received burst modulation at 2 pps for 10 minutes. Completed at the same time patient was on vasopneumatic device.     Patient Education and Home Exercises       Education provided:   - Plan of Care, Home exercise program, Delayed onset muscle soreness     Written Home Exercises Provided: Patient instructed to cont prior HEP. Exercises were reviewed and Leno was able to demonstrate them prior to the end of the session.  Leno demonstrated fair  understanding of the education provided. See Electronic Medical Record under Patient Instructions for exercises provided during therapy sessions    Assessment     Quintin is a 58 y.o. male referred to outpatient Physical Therapy with a medical diagnosis of L TKA. Patient presents with L knee pain, decreased L knee active range of motion, L LE muscle strength, decreased L LE motor control and impaired gait mechanics. Patient's impairments are currently limiting his independence with functional mobility tasks and his activity tolerance.  PT initiated physical therapy treatment session with proper set up and positioning on Nustep for warm up and to increase L knee ROM. PT discussed patient's back pain and how impaired gait mechanics and L hip hiking compensations due to decreased knee flexion can be causing back pain. PT noted L innominate upslip upon palpation assessment. PT completed modified innominate correction in R sidelying to attempt to decrease patient's low back pain. Patient progressed through exercises with moderate cueing for decrease compensations and proper hold times. Patient had no reports of increased pain  or adverse effects to treatment.      Leno Is progressing well towards his goals.   Patient prognosis is Good.     Patient will continue to benefit from skilled outpatient physical therapy to address the deficits listed in the problem list box on initial evaluation, provide pt/family education and to maximize pt's level of independence in the home and community environment.     Patient's spiritual, cultural and educational needs considered and pt agreeable to plan of care and goals.     Anticipated barriers to physical therapy: compliance with Home exercise program     Goals: Short Term Goals: 3 weeks   Patient will independently complete Home exercise program with correct form.   Patient will report decreased pain to less than or equal to 2/10 for improved quality of life.   Patient will independently transfer sit to stand without UE use for increased independence with functional transfers.  Patient will ambulate community distances without assistive device for return to PLOF.       Long Term Goals: 6 weeks   Pt will increase L knee flexion to 120 degree, knee extension to 0 degrees, and quad lag to 0 degrees to allow patient normal gait pattern.  Pt will increase L knee strength to 4/5 to allow patient to safely return to prior level of function   Patient will ambulate with increased gait speed to greater than or equal to 3.25 feet per second for increased safety with community ambulation   Patient will negotiate up and down stairs with a reciprocal pattern in preparation for return to stair climbing at work.   Patient will have increased FOTO score to greater than or equal to 50% indicating increased function.   Patient will complete 5 x sit<> less than or equal to 12 seconds indicating increased LE power for functional transfers.     Plan     Plan of care Certification: 2/15/2024 to 3/29/2024.  Outpatient Physical Therapy 3 times weekly for 6 weeks to include the following interventions: Electrical  Stimulation unattended, Gait Training, Manual Therapy, Moist Heat/ Ice, Neuromuscular Re-ed, Patient Education, Therapeutic Activities, and Therapeutic Exercise.     Continue Plan of Care per PT orer to progress patient toward rehab goals.     Francisco Bell, PT, DPT   2/20/2024

## 2024-02-22 ENCOUNTER — CLINICAL SUPPORT (OUTPATIENT)
Dept: REHABILITATION | Facility: HOSPITAL | Age: 59
End: 2024-02-22
Payer: COMMERCIAL

## 2024-02-22 DIAGNOSIS — M17.12 PRIMARY OSTEOARTHRITIS OF LEFT KNEE: Primary | ICD-10-CM

## 2024-02-22 PROCEDURE — 97110 THERAPEUTIC EXERCISES: CPT | Mod: CQ

## 2024-02-22 NOTE — PROGRESS NOTES
"[facility]   Physical Therapy Treatment Note      Name: Quintin Sandoval  Clinic Number: [id number]    Therapy Diagnosis:        Encounter Diagnosis   Name Primary?    Primary osteoarthritis of left knee          Physician: Jairo Gilmore MD     Physician Orders: PT Eval and Treat   Medical Diagnosis from Referral: L TKR   Evaluation Date: 2/15/2024  Authorization Period Expiration: 2/14/2025  Plan of Care Expiration: 3/29/2024  Progress Note Due: 3/29/2024  Date of Surgery: 2/12/2024  Visit # / Visits authorized: 4/18   FOTO: to be completed on visit 6      Precautions: Standard      Time In: 8:45  Time Out: 9:40  Total Billable Time:  45 minutes   Visit Date: 2/22/2024      PTA Visit #: 1/5  Subjective     Patient reports:  Back is feeling better, and no new reports of pain or   Complications of Left knee     He was compliant with home exercise prgram.  Response to previous treatment: Soreness in Left knee post treatment  Functional change: Early in Plan of Care     Pain: 3/10  Location: left knee      Objective      Objective Measures updated at progress report unless specified.     Treatment     Leno received the treatments listed below:      therapeutic exercises to develop strength, endurance, ROM, and flexibility for  minutes including:     Ther- Ex Reps   Nustep  8 minutes    Wedge 2 minutes    Hamstring Stretch 3 x 20"    Heelslides (seated)  3 x 10 (90  degrees) *   LAQ's 2 x 10    Hamstring Curls 2 x 10, yellow *    Hip adduction with ball  20 x 3" *    Quad sets  3 x 10    SAQ's 3 x 10 *   Straight Leg Raises 2 x 10    Glute Sets     Heel Slides (supine)  2 x 10 (88 degrees)          Ther-Act     Heel Raises     Mini Squats     3 way hip     Sit <> stands      Forward Step ups     Lateral Step ups                                      CP application to Left knee x 10 minutes with L LE elevated greater than heart level to decrease post treatment pain and edema.     Patient " Education and Home Exercises       Education provided:   - Plan of Care, Home exercise program, Delayed onset muscle soreness     Written Home Exercises Provided: Patient instructed to cont prior HEP. Exercises were reviewed and Leno was able to demonstrate them prior to the end of the session.  Leno demonstrated fair  understanding of the education provided. See Electronic Medical Record under Patient Instructions for exercises provided during therapy sessions    Assessment     Quintin is a 58 y.o. male referred to outpatient Physical Therapy with a medical diagnosis of L TKA. Patient presents with L knee pain, decreased L knee active range of motion, L LE muscle strength, decreased L LE motor control and impaired gait mechanics. Patient's impairments are currently limiting his independence with functional mobility tasks and his activity tolerance.  PT initiated physical therapy treatment session with proper set up and positioning on Nustep for warm up and to increase L knee ROM.   LPTA increased reps of Therapeutic exercises as tolerated by patient.  Patient able to progress through exercises with mod difficulty due to reports of stiffness in Left knee.  Noted increased active range of motion L knee flexion at end of physical therapy treatment session.  Patient without report of increased pain or complications at end of physical therapy treatment session.       Leno Is progressing well towards his goals.   Patient prognosis is Good.     Patient will continue to benefit from skilled outpatient physical therapy to address the deficits listed in the problem list box on initial evaluation, provide pt/family education and to maximize pt's level of independence in the home and community environment.     Patient's spiritual, cultural and educational needs considered and pt agreeable to plan of care and goals.     Anticipated barriers to physical therapy: compliance with Home exercise program     Goals: Short Term Goals: 3  weeks   Patient will independently complete Home exercise program with correct form.   Patient will report decreased pain to less than or equal to 2/10 for improved quality of life.   Patient will independently transfer sit to stand without UE use for increased independence with functional transfers.  Patient will ambulate community distances without assistive device for return to PLOF.       Long Term Goals: 6 weeks   Pt will increase L knee flexion to 120 degree, knee extension to 0 degrees, and quad lag to 0 degrees to allow patient normal gait pattern.  Pt will increase L knee strength to 4/5 to allow patient to safely return to prior level of function   Patient will ambulate with increased gait speed to greater than or equal to 3.25 feet per second for increased safety with community ambulation   Patient will negotiate up and down stairs with a reciprocal pattern in preparation for return to stair climbing at work.   Patient will have increased FOTO score to greater than or equal to 50% indicating increased function.   Patient will complete 5 x sit<> less than or equal to 12 seconds indicating increased LE power for functional transfers.     Plan     Plan of care Certification: 2/15/2024 to 3/29/2024.  Outpatient Physical Therapy 3 times weekly for 6 weeks to include the following interventions: Electrical Stimulation unattended, Gait Training, Manual Therapy, Moist Heat/ Ice, Neuromuscular Re-ed, Patient Education, Therapeutic Activities, and Therapeutic Exercise.     Continue Plan of Care per PT orer to progress patient toward rehab goals.     Saritha White, PTA,   2/22/2024

## 2024-02-23 ENCOUNTER — CLINICAL SUPPORT (OUTPATIENT)
Dept: REHABILITATION | Facility: HOSPITAL | Age: 59
End: 2024-02-23
Payer: COMMERCIAL

## 2024-02-23 DIAGNOSIS — M17.12 PRIMARY OSTEOARTHRITIS OF LEFT KNEE: Primary | ICD-10-CM

## 2024-02-23 PROCEDURE — 97010 HOT OR COLD PACKS THERAPY: CPT | Mod: CQ

## 2024-02-23 PROCEDURE — 97110 THERAPEUTIC EXERCISES: CPT | Mod: CQ

## 2024-02-23 NOTE — PROGRESS NOTES
"[facility]   Physical Therapy Treatment Note      Name: Quintin Sandoval  Clinic Number: [id number]    Therapy Diagnosis:        Encounter Diagnosis   Name Primary?    Primary osteoarthritis of left knee          Physician: Jairo Gilmore MD     Physician Orders: PT Eval and Treat   Medical Diagnosis from Referral: L TKR   Evaluation Date: 2/15/2024  Authorization Period Expiration: 2/14/2025  Plan of Care Expiration: 3/29/2024  Progress Note Due: 3/29/2024  Date of Surgery: 2/12/2024  Visit # / Visits authorized: 5/18   FOTO: to be completed on visit 6      Precautions: Standard      Time In: 14:45  Time Out: 15:27  Total Billable Time:  42 minutes   Visit Date: 2/23/2024      PTA Visit #: 1/5  Subjective     Patient reports:  Back is feeling better, and no new reports of pain or   Complications of Left knee     He was compliant with home exercise prgram.  Response to previous treatment: Soreness in Left knee post treatment  Functional change: Early in Plan of Care     Pain: 3/10  Location: left knee      Objective      Objective Measures updated at progress report unless specified.     Treatment     Leno received the treatments listed below:      therapeutic exercises to develop strength, endurance, ROM, and flexibility for  minutes including:     Ther- Ex Reps   Nustep  8 minutes    Wedge 2 minutes    Hamstring Stretch 3 x 20"    Heelslides (seated)  3 x 10 (90  degrees)    LAQ's 2 x 10    Hamstring Curls 2 x 10, yellow     Hip adduction with ball  20 x 3"    Quad sets  3 x 10    SAQ's 3 x 10    Straight Leg Raises 2 x 10    Glute Sets     Heel Slides (supine)  2 x 10 (91 degrees)          Ther-Act     Heel Raises     Mini Squats     3 way hip     Sit <> stands      Forward Step ups     Lateral Step ups                                      CP application to Left knee x 6 minutes with L LE elevated greater than heart level to decrease post treatment pain and edema.     Patient " Education and Home Exercises       Education provided:   - Plan of Care, Home exercise program, Delayed onset muscle soreness     Written Home Exercises Provided: Patient instructed to cont prior HEP. Exercises were reviewed and Leno was able to demonstrate them prior to the end of the session.  Leno demonstrated fair  understanding of the education provided. See Electronic Medical Record under Patient Instructions for exercises provided during therapy sessions    Assessment     Quintin is a 58 y.o. male referred to outpatient Physical Therapy with a medical diagnosis of L TKA. Patient presents with L knee pain, decreased L knee active range of motion, L LE muscle strength, decreased L LE motor control and impaired gait mechanics. Patient's impairments are currently limiting his independence with functional mobility tasks and his activity tolerance. LPTA provided pt with proper setup on NuStep with positioning to improve left knee flexion. Tx intensity not increased secondary to recent increase. Pt displayed increased difficulty with straight leg raises and quad lag noted.  Improved left knee flexion active range of motion noted with supine heel slides.  No adverse effects noted from tx.    Leno Is progressing well towards his goals.   Patient prognosis is Good.     Patient will continue to benefit from skilled outpatient physical therapy to address the deficits listed in the problem list box on initial evaluation, provide pt/family education and to maximize pt's level of independence in the home and community environment.     Patient's spiritual, cultural and educational needs considered and pt agreeable to plan of care and goals.     Anticipated barriers to physical therapy: compliance with Home exercise program     Goals: Short Term Goals: 3 weeks   Patient will independently complete Home exercise program with correct form.   Patient will report decreased pain to less than or equal to 2/10 for improved quality of  life.   Patient will independently transfer sit to stand without UE use for increased independence with functional transfers.  Patient will ambulate community distances without assistive device for return to PLOF.       Long Term Goals: 6 weeks   Pt will increase L knee flexion to 120 degree, knee extension to 0 degrees, and quad lag to 0 degrees to allow patient normal gait pattern.  Pt will increase L knee strength to 4/5 to allow patient to safely return to prior level of function   Patient will ambulate with increased gait speed to greater than or equal to 3.25 feet per second for increased safety with community ambulation   Patient will negotiate up and down stairs with a reciprocal pattern in preparation for return to stair climbing at work.   Patient will have increased FOTO score to greater than or equal to 50% indicating increased function.   Patient will complete 5 x sit<> less than or equal to 12 seconds indicating increased LE power for functional transfers.     Plan     Plan of care Certification: 2/15/2024 to 3/29/2024.  Outpatient Physical Therapy 3 times weekly for 6 weeks to include the following interventions: Electrical Stimulation unattended, Gait Training, Manual Therapy, Moist Heat/ Ice, Neuromuscular Re-ed, Patient Education, Therapeutic Activities, and Therapeutic Exercise.     Continue Plan of Care per PT orer to progress patient toward rehab goals.   STAN Alvarado  2/23/2024

## 2024-02-26 RX ORDER — MONTELUKAST SODIUM 10 MG/1
10 TABLET ORAL NIGHTLY
Qty: 90 TABLET | Refills: 1 | Status: SHIPPED | OUTPATIENT
Start: 2024-02-26

## 2024-02-27 ENCOUNTER — CLINICAL SUPPORT (OUTPATIENT)
Dept: REHABILITATION | Facility: HOSPITAL | Age: 59
End: 2024-02-27
Payer: COMMERCIAL

## 2024-02-27 DIAGNOSIS — M17.12 PRIMARY OSTEOARTHRITIS OF LEFT KNEE: Primary | ICD-10-CM

## 2024-02-27 PROCEDURE — 97530 THERAPEUTIC ACTIVITIES: CPT

## 2024-02-27 PROCEDURE — 97110 THERAPEUTIC EXERCISES: CPT

## 2024-02-27 NOTE — PROGRESS NOTES
"[facility]   Physical Therapy Treatment Note      Name: Quintin Sandoval  Clinic Number: [id number]    Therapy Diagnosis:        Encounter Diagnosis   Name Primary?    Primary osteoarthritis of left knee          Physician: Jairo Gilmore MD     Physician Orders: PT Eval and Treat   Medical Diagnosis from Referral: L TKR   Evaluation Date: 2/15/2024  Authorization Period Expiration: 2/14/2025  Plan of Care Expiration: 3/29/2024  Progress Note Due: 3/29/2024  Date of Surgery: 2/12/2024  Visit # / Visits authorized: 6/18   FOTO: to be completed on visit 6      Precautions: Standard      Time In: 10:09  Time Out: 11:07  Total Billable Time:  58 minutes (50 minutes billed and 8 minutes not billed for ice)   Visit Date: 2/27/2024      PTA Visit #: 0/5  Subjective     Patient reports:  "I am doing good right now."     He was compliant with home exercise prgram.  Response to previous treatment: Soreness in Left knee post treatment  Functional change: improving gait pattern     Pain: 2/10  Location: left knee      Objective      Objective Measures updated at progress report unless specified.     Treatment     Leno received the treatments listed below:      therapeutic exercises to develop strength, endurance, ROM, and flexibility for 35 minutes including:  Therapeutic activities to improve functional performance for 15 minutes including:      Ther- Ex Reps   Nustep 10  minutes    Wedge 2 minutes    Hamstring Stretch 3 x 20"    Heelslides (seated)  3 x 10 (not today)   LAQ's 2 x 10 3"   Hamstring Curls 2 x 10, yellow     Hip adduction with ball  20 x 3" 3"   Quad sets  3 x 10 3"   SAQ's 3 x 10 3"   Straight Leg Raises 2 x 10    Glute Sets     Heel Slides with ankle pump (supine)  3 x 10 (97 degrees)          Ther-Act  Reps   Heel Raises  2 x 10    Mini Squats     L hip & knee flexion  2 x 10    Sit <> stands   2 x 10    Forward Step ups  10 x low step    Lateral Step ups      TKEs  2 x 10 red " TB                               CP application to Left knee x 8 minutes with L LE elevated greater than heart level to decrease post treatment pain and edema.     Patient Education and Home Exercises       Education provided:   - Plan of Care, Home exercise program, Delayed onset muscle soreness     Written Home Exercises Provided: Patient instructed to cont prior HEP. Exercises were reviewed and Leno was able to demonstrate them prior to the end of the session.  Leno demonstrated fair  understanding of the education provided. See Electronic Medical Record under Patient Instructions for exercises provided during therapy sessions    Assessment     Quintin is a 58 y.o. male referred to outpatient Physical Therapy with a medical diagnosis of L TKA. Patient presents with L knee pain, decreased L knee active range of motion, L LE muscle strength, decreased L LE motor control and impaired gait mechanics. Patient's impairments are currently limiting his independence with functional mobility tasks and his activity tolerance. PT provided pt with proper setup on NuStep with positioning to improve left knee flexion and decrease stiffness prior to exercises. PT initiated standing tasks with visual and verbal cues for L LE alignment, speed, form, and decreased compensations. Patient reported increased L knee pain during forward step up tasks. PT educated patient on increasing L knee flexion and weight bearing during sit to stand transfers and increasing L heel strike during ambulation to improved functional ROM. Patient continues to have significant deficits in L knee extension ROM at this time; therefor, PT educated patient on propped knee extension stretch to increase extension ROM. No adverse effects noted to PT treatment.     Leno Is progressing well towards his goals.   Patient prognosis is Good.     Patient will continue to benefit from skilled outpatient physical therapy to address the deficits listed in the problem list box  on initial evaluation, provide pt/family education and to maximize pt's level of independence in the home and community environment.     Patient's spiritual, cultural and educational needs considered and pt agreeable to plan of care and goals.     Anticipated barriers to physical therapy: compliance with Home exercise program     Goals: Short Term Goals: 3 weeks   Patient will independently complete Home exercise program with correct form.   Patient will report decreased pain to less than or equal to 2/10 for improved quality of life.   Patient will independently transfer sit to stand without UE use for increased independence with functional transfers.  Patient will ambulate community distances without assistive device for return to PLOF.       Long Term Goals: 6 weeks   Pt will increase L knee flexion to 120 degree, knee extension to 0 degrees, and quad lag to 0 degrees to allow patient normal gait pattern.  Pt will increase L knee strength to 4/5 to allow patient to safely return to prior level of function   Patient will ambulate with increased gait speed to greater than or equal to 3.25 feet per second for increased safety with community ambulation   Patient will negotiate up and down stairs with a reciprocal pattern in preparation for return to stair climbing at work.   Patient will have increased FOTO score to greater than or equal to 50% indicating increased function.   Patient will complete 5 x sit<> less than or equal to 12 seconds indicating increased LE power for functional transfers.     Plan     Plan of care Certification: 2/15/2024 to 3/29/2024.  Outpatient Physical Therapy 3 times weekly for 6 weeks to include the following interventions: Electrical Stimulation unattended, Gait Training, Manual Therapy, Moist Heat/ Ice, Neuromuscular Re-ed, Patient Education, Therapeutic Activities, and Therapeutic Exercise.     Continue Plan of Care per PT orer to progress patient toward rehab goals.   Bonetia  Ray, PT, DPT     2/27/2024

## 2024-02-28 ENCOUNTER — CLINICAL SUPPORT (OUTPATIENT)
Dept: REHABILITATION | Facility: HOSPITAL | Age: 59
End: 2024-02-28
Payer: COMMERCIAL

## 2024-02-28 DIAGNOSIS — M17.12 PRIMARY OSTEOARTHRITIS OF LEFT KNEE: Primary | ICD-10-CM

## 2024-02-28 PROCEDURE — 97530 THERAPEUTIC ACTIVITIES: CPT | Mod: CQ

## 2024-02-28 PROCEDURE — 97110 THERAPEUTIC EXERCISES: CPT | Mod: CQ

## 2024-02-28 PROCEDURE — 97010 HOT OR COLD PACKS THERAPY: CPT | Mod: CQ

## 2024-02-28 NOTE — PROGRESS NOTES
"[facility]   Physical Therapy Treatment Note      Name: Quintin Sandoval  Clinic Number: [id number]    Therapy Diagnosis:        Encounter Diagnosis   Name Primary?    Primary osteoarthritis of left knee          Physician: Jairo Gilmore MD     Physician Orders: PT Eval and Treat   Medical Diagnosis from Referral: L TKR   Evaluation Date: 2/15/2024  Authorization Period Expiration: 2/14/2025  Plan of Care Expiration: 3/29/2024  Progress Note Due: 3/29/2024  Date of Surgery: 2/12/2024  Visit # / Visits authorized: 7/18   FOTO: to be completed on visit 6      Precautions: Standard      Time In: 8:50  Time Out: 9:50  Total Billable Time:  60 minutes (52 minutes billed and 8 minutes not billed for ice)   Visit Date: 2/28/2024      PTA Visit #: 1/5  Subjective     Patient reports:  "I am doing good right now."     He was compliant with home exercise prgram.  Response to previous treatment: Soreness in Left knee post treatment  Functional change: improving gait pattern     Pain: 2/10  Location: left knee      Objective      Objective Measures updated at progress report unless specified.     Treatment     Leno received the treatments listed below:      therapeutic exercises to develop strength, endurance, ROM, and flexibility for 35 minutes including:  Therapeutic activities to improve functional performance for 15 minutes including:      Ther- Ex Reps   Nustep 10  minutes    Wedge 2 minutes    Hamstring Stretch 3 x 20"    Heelslides (seated)  3 x 10 (not today)   LAQ's 2 x 10 3"   Hamstring Curls 2 x 10, yellow     Hip adduction with ball  20 x 3" 3"   Quad sets  3 x 10 3"   SAQ's 3 x 10 3"   Straight Leg Raises 2 x 10    Glute Sets     Heel Slides with ankle pump (supine)  3 x 10 (97 degrees)          Ther-Act  Reps   Heel Raises  2 x 10    Mini Squats 15 x *   L hip & knee flexion  2 x 10    Sit <> stands   2 x 10    Forward Step ups  10 x low step    Lateral Step ups      TKEs  2 x 10 " red TB                               CP application to Left knee x 8 minutes with L LE elevated greater than heart level to decrease post treatment pain and edema.     Patient Education and Home Exercises       Education provided:   - Plan of Care, Home exercise program, Delayed onset muscle soreness     Written Home Exercises Provided: Patient instructed to cont prior HEP. Exercises were reviewed and Leno was able to demonstrate them prior to the end of the session.  Leno demonstrated fair  understanding of the education provided. See Electronic Medical Record under Patient Instructions for exercises provided during therapy sessions    Assessment     Quintin is a 58 y.o. male referred to outpatient Physical Therapy with a medical diagnosis of L TKA. Patient presents with L knee pain, decreased L knee active range of motion, L LE muscle strength, decreased L LE motor control and impaired gait mechanics. Patient's impairments are currently limiting his independence with functional mobility tasks and his activity tolerance. LPTA provided pt with proper setup on NuStep with positioning to improve left knee flexion and decrease stiffness prior to exercises. PT initiated standing tasks with visual and verbal cues for L LE alignment, speed, form, and decreased compensations.  Pt c/o pain during mini squats secondary to accidentally squatting too low. Frequent rest breaks required secondary to muscle fatigue. No adverse effects noted.     Leno Is progressing well towards his goals.   Patient prognosis is Good.     Patient will continue to benefit from skilled outpatient physical therapy to address the deficits listed in the problem list box on initial evaluation, provide pt/family education and to maximize pt's level of independence in the home and community environment.     Patient's spiritual, cultural and educational needs considered and pt agreeable to plan of care and goals.     Anticipated barriers to physical therapy:  compliance with Home exercise program     Goals: Short Term Goals: 3 weeks   Patient will independently complete Home exercise program with correct form.   Patient will report decreased pain to less than or equal to 2/10 for improved quality of life.   Patient will independently transfer sit to stand without UE use for increased independence with functional transfers.  Patient will ambulate community distances without assistive device for return to PLOF.       Long Term Goals: 6 weeks   Pt will increase L knee flexion to 120 degree, knee extension to 0 degrees, and quad lag to 0 degrees to allow patient normal gait pattern.  Pt will increase L knee strength to 4/5 to allow patient to safely return to prior level of function   Patient will ambulate with increased gait speed to greater than or equal to 3.25 feet per second for increased safety with community ambulation   Patient will negotiate up and down stairs with a reciprocal pattern in preparation for return to stair climbing at work.   Patient will have increased FOTO score to greater than or equal to 50% indicating increased function.   Patient will complete 5 x sit<> less than or equal to 12 seconds indicating increased LE power for functional transfers.     Plan     Plan of care Certification: 2/15/2024 to 3/29/2024.  Outpatient Physical Therapy 3 times weekly for 6 weeks to include the following interventions: Electrical Stimulation unattended, Gait Training, Manual Therapy, Moist Heat/ Ice, Neuromuscular Re-ed, Patient Education, Therapeutic Activities, and Therapeutic Exercise.     Continue Plan of Care per PT orer to progress patient toward rehab goals.   STAN Alvarado  2/28/2024

## 2024-03-01 ENCOUNTER — CLINICAL SUPPORT (OUTPATIENT)
Dept: REHABILITATION | Facility: HOSPITAL | Age: 59
End: 2024-03-01
Payer: COMMERCIAL

## 2024-03-01 DIAGNOSIS — M17.12 PRIMARY OSTEOARTHRITIS OF LEFT KNEE: Primary | ICD-10-CM

## 2024-03-01 PROCEDURE — 97530 THERAPEUTIC ACTIVITIES: CPT | Mod: CQ

## 2024-03-01 PROCEDURE — 97110 THERAPEUTIC EXERCISES: CPT | Mod: CQ

## 2024-03-01 NOTE — PROGRESS NOTES
"[facility]   Physical Therapy Treatment Note      Name: Quintin Sandoval  Clinic Number: [id number]    Therapy Diagnosis:        Encounter Diagnosis   Name Primary?    Primary osteoarthritis of left knee          Physician: Jairo Gilmore MD     Physician Orders: PT Eval and Treat   Medical Diagnosis from Referral: L TKR   Evaluation Date: 2/15/2024  Authorization Period Expiration: 2/14/2025  Plan of Care Expiration: 3/29/2024  Progress Note Due: 3/29/2024  Date of Surgery: 2/12/2024  Visit # / Visits authorized: 8/18   FOTO: to be completed on visit 6      Precautions: Standard      Time In: 10:55  Time Out: 10:40  Total Billable Time: 45 minutes     Visit Date: 3/1/2024      PTA Visit #: 2/5  Subjective     Patient reports:  No new complaints of pain.  Ambulates into physical therapy treamtent session with [facility].  Patient states doctor told him yesterday to use cane outdoors and when he feels comfortable to wean away from cane inside home.     He was compliant with home exercise prgram.  Response to previous treatment: Soreness in Left knee post treatment  Functional change: improving gait pattern     Pain: 2/10  Location: left knee      Objective      Objective Measures updated at progress report unless specified.     Treatment     Leno received the treatments listed below:      therapeutic exercises to develop strength, endurance, ROM, and flexibility.      Ther- Ex Reps   Nustep 10  minutes    Wedge 2 minutes    Hamstring Stretch 2 x 20"    Heelslides (seated)  3 x 10    LAQ's 2 x 10, 2# *    Hamstring Curls 2 x 10, yellow     Hip adduction with ball  20 x 3" 3"   Quad sets  3 x 10 3"  8 degrees *    SAQ's 3 x 10 3"   Straight Leg Raises 2 x 10    Glute Sets     Heel Slides with ankle pump (supine)  3 x 10 (103 degrees)               Therapeutic activities to improve functional performance .  See flow-sheet below.     Ther-Act  Reps   Heel Raises  2 x 10    Mini Squats 20 x *    L " "hip & knee flexion 15 x *    Sit <> stands   2 x 10    Forward Step ups 10 x low step    Lateral Step ups      TKEs  2 x 10 red TB                              Patient declined: CP application to Left knee x 8 minutes with L LE elevated greater than heart level to decrease post treatment pain and edema.     Patient Education and Home Exercises       Education provided:   - Plan of Care, Home exercise program, Delayed onset muscle soreness     Written Home Exercises Provided: Patient instructed to cont prior HEP. Exercises were reviewed and Leno was able to demonstrate them prior to the end of the session.  Leno demonstrated fair  understanding of the education provided. See Electronic Medical Record under Patient Instructions for exercises provided during therapy sessions    Assessment     Quintin is a 58 y.o. male referred to outpatient Physical Therapy with a medical diagnosis of L TKA. Patient presents with L knee pain, decreased L knee active range of motion, L LE muscle strength, decreased L LE motor control and impaired gait mechanics. Patient's impairments are currently limiting his independence with functional mobility tasks and his activity tolerance. LPTA provided pt with proper setup on NuStep with positioning to improve left knee flexion and decrease stiffness prior to exercises.  LPTA increased reps of Therapeutic exercises and Therapeutic activities as tolerated by patient with occasional verbal and visual cues to maintain proper speed of movement, count, and hold times. Patient states "My knee actually feels better today" when completing mini squats.  Noted improved active range of motion extension to flexion at end of treatment.  Patient declined modalities, and states "I'm going to put ice on it at home".     Leno Is progressing well towards his goals.   Patient prognosis is Good.     Patient will continue to benefit from skilled outpatient physical therapy to address the deficits listed in the problem " list box on initial evaluation, provide pt/family education and to maximize pt's level of independence in the home and community environment.     Patient's spiritual, cultural and educational needs considered and pt agreeable to plan of care and goals.     Anticipated barriers to physical therapy: compliance with Home exercise program     Goals: Short Term Goals: 3 weeks   Patient will independently complete Home exercise program with correct form.   Patient will report decreased pain to less than or equal to 2/10 for improved quality of life.   Patient will independently transfer sit to stand without UE use for increased independence with functional transfers.  Patient will ambulate community distances without assistive device for return to PLOF.       Long Term Goals: 6 weeks   Pt will increase L knee flexion to 120 degree, knee extension to 0 degrees, and quad lag to 0 degrees to allow patient normal gait pattern.  Pt will increase L knee strength to 4/5 to allow patient to safely return to prior level of function   Patient will ambulate with increased gait speed to greater than or equal to 3.25 feet per second for increased safety with community ambulation   Patient will negotiate up and down stairs with a reciprocal pattern in preparation for return to stair climbing at work.   Patient will have increased FOTO score to greater than or equal to 50% indicating increased function.   Patient will complete 5 x sit<> less than or equal to 12 seconds indicating increased LE power for functional transfers.     Plan     Plan of care Certification: 2/15/2024 to 3/29/2024.  Outpatient Physical Therapy 3 times weekly for 6 weeks to include the following interventions: Electrical Stimulation unattended, Gait Training, Manual Therapy, Moist Heat/ Ice, Neuromuscular Re-ed, Patient Education, Therapeutic Activities, and Therapeutic Exercise.     Continue Plan of Care per PT orer to progress patient toward rehab goals.    STAN Reyna   3/1/2024

## 2024-03-04 ENCOUNTER — CLINICAL SUPPORT (OUTPATIENT)
Dept: REHABILITATION | Facility: HOSPITAL | Age: 59
End: 2024-03-04
Payer: COMMERCIAL

## 2024-03-04 DIAGNOSIS — M17.12 PRIMARY OSTEOARTHRITIS OF LEFT KNEE: Primary | ICD-10-CM

## 2024-03-04 PROCEDURE — 97110 THERAPEUTIC EXERCISES: CPT | Mod: CQ

## 2024-03-04 PROCEDURE — 97530 THERAPEUTIC ACTIVITIES: CPT | Mod: CQ

## 2024-03-04 PROCEDURE — 97016 VASOPNEUMATIC DEVICE THERAPY: CPT | Mod: CQ

## 2024-03-04 NOTE — PROGRESS NOTES
"[facility]   Physical Therapy Treatment Note      Name: Quintin Sandoval  Clinic Number: [id number]    Therapy Diagnosis:        Encounter Diagnosis   Name Primary?    Primary osteoarthritis of left knee          Physician: Jairo Gilmore MD     Physician Orders: PT Eval and Treat   Medical Diagnosis from Referral: L TKR   Evaluation Date: 2/15/2024  Authorization Period Expiration: 2/14/2025  Plan of Care Expiration: 3/29/2024  Progress Note Due: 3/29/2024  Date of Surgery: 2/12/2024  Visit # / Visits authorized: 9/18   FOTO: to be completed on visit 6      Precautions: Standard      Time In: 8:01  Time Out: 9:15  Total Billable Time:  74 minutes     Visit Date: 3/4/2024      PTA Visit #: 3/5  Subjective     Patient reports:  "My knee is really hurting me today".  Patient ambulates to physical therapy treatment session using SC.      He was compliant with home exercise prgram.  Response to previous treatment: Soreness in Left knee post treatment  Functional change: improving gait pattern     Pain: 3/10  Location: left knee      Objective      Objective Measures updated at progress report unless specified.     Treatment     Leno received the treatments listed below:      therapeutic exercises to develop strength, endurance, ROM, and flexibility.      Ther- Ex Reps   Nustep 10  minutes    Wedge 2 minutes    Hamstring Stretch 2 x 20"    Heelslides (seated)  3 x 10 (94 degrees)*    LAQ's 3 x 10, 2# *    Hamstring Curls 3 x 10 red *    Hip adduction with ball  30 x 3" 3" *   Quad sets  3 x 10 3"  10 degrees active-assisted (cueing)*    SAQ's 3 x 10, 2# *    Straight Leg Raises 2 x 10  (with-held)    Heel Slides with ankle pump (supine)  3 x 10 (103 degrees)               Therapeutic activities to improve functional performance .  See flow-sheet below.     Ther-Act  Reps   Heel Raises 3 x 10 *   Mini Squats 20 x *    L hip & knee flexion 30 x 3" *    Sit <> stands   2 x 10    Forward Step " ups 20 x low step *   Lateral Step ups      TKEs  2 x 10 red TB                              Vaso-pneumatic Device x 10 mins on moderate pressure to Left LE/knee with L LE elevated greater than heart level.     Patient Education and Home Exercises       Education provided:   - Plan of Care, Home exercise program, Delayed onset muscle soreness     Written Home Exercises Provided: Patient instructed to cont prior HEP. Exercises were reviewed and Leno was able to demonstrate them prior to the end of the session.  Leno demonstrated fair  understanding of the education provided. See Electronic Medical Record under Patient Instructions for exercises provided during therapy sessions    Assessment     Quintin is a 58 y.o. male referred to outpatient Physical Therapy with a medical diagnosis of L TKA. Patient presents with L knee pain, decreased L knee active range of motion, L LE muscle strength, decreased L LE motor control and impaired gait mechanics. Patient's impairments are currently limiting his independence with functional mobility tasks and his activity tolerance. LPTA provided pt with proper setup on NuStep with positioning to improve left knee flexion and decrease stiffness prior to exercises.  LPTA increased reps of Therapeutic exercises and Therapeutic activities as tolerated by patient with occasional verbal and visual cues to maintain proper speed of movement, count, and hold times.  Patient reports feeling moderate discomfort in Left knee throughout entirety of PT treatment session, and requires increased time and effort to progress through completion of Therapeutic exercises and Therapeutic activities.  Patient with moderate Left knee extension lag, but able to improve from 18 to 10 after manual stretch and with moderate cueing. LPTA with-held supine straight leg raises' secondary to inadequate Quad activation with Quad setting exercises.   Vaso-pneumatic device applied to Left knee at end of PT treatment  "session secondary to patient stating increased Left knee pain to 7/10 during treatment and reports "feeling like it's swelling".          Leno Is progressing well towards his goals.   Patient prognosis is Good.     Patient will continue to benefit from skilled outpatient physical therapy to address the deficits listed in the problem list box on initial evaluation, provide pt/family education and to maximize pt's level of independence in the home and community environment.     Patient's spiritual, cultural and educational needs considered and pt agreeable to plan of care and goals.     Anticipated barriers to physical therapy: compliance with Home exercise program     Goals: Short Term Goals: 3 weeks   Patient will independently complete Home exercise program with correct form.   Patient will report decreased pain to less than or equal to 2/10 for improved quality of life.   Patient will independently transfer sit to stand without UE use for increased independence with functional transfers.  Patient will ambulate community distances without assistive device for return to PLOF.       Long Term Goals: 6 weeks   Pt will increase L knee flexion to 120 degree, knee extension to 0 degrees, and quad lag to 0 degrees to allow patient normal gait pattern.  Pt will increase L knee strength to 4/5 to allow patient to safely return to prior level of function   Patient will ambulate with increased gait speed to greater than or equal to 3.25 feet per second for increased safety with community ambulation   Patient will negotiate up and down stairs with a reciprocal pattern in preparation for return to stair climbing at work.   Patient will have increased FOTO score to greater than or equal to 50% indicating increased function.   Patient will complete 5 x sit<> less than or equal to 12 seconds indicating increased LE power for functional transfers.     Plan     Plan of care Certification: 2/15/2024 to 3/29/2024.  Outpatient " Physical Therapy 3 times weekly for 6 weeks to include the following interventions: Electrical Stimulation unattended, Gait Training, Manual Therapy, Moist Heat/ Ice, Neuromuscular Re-ed, Patient Education, Therapeutic Activities, and Therapeutic Exercise.     Continue Plan of Care per PT orer to progress patient toward rehab goals.   STAN Reyna   3/4/2024

## 2024-03-06 ENCOUNTER — CLINICAL SUPPORT (OUTPATIENT)
Dept: REHABILITATION | Facility: HOSPITAL | Age: 59
End: 2024-03-06
Payer: COMMERCIAL

## 2024-03-06 DIAGNOSIS — M17.12 PRIMARY OSTEOARTHRITIS OF LEFT KNEE: Primary | ICD-10-CM

## 2024-03-06 PROCEDURE — 97110 THERAPEUTIC EXERCISES: CPT

## 2024-03-06 PROCEDURE — 97530 THERAPEUTIC ACTIVITIES: CPT

## 2024-03-06 NOTE — PROGRESS NOTES
"[facility]   Physical Therapy Treatment Note      Name: Quintin Sandoval  Clinic Number: [id number]    Therapy Diagnosis:        Encounter Diagnosis   Name Primary?    Primary osteoarthritis of left knee          Physician: Jairo Gilmore MD     Physician Orders: PT Eval and Treat   Medical Diagnosis from Referral: L TKR   Evaluation Date: 2/15/2024  Authorization Period Expiration: 2/14/2025  Plan of Care Expiration: 3/29/2024  Progress Note Due: 3/29/2024  Date of Surgery: 2/12/2024  Visit # / Visits authorized: 9/18   FOTO: to be completed on visit 6      Precautions: Standard      Time In: 8:00  Time Out: 9:08  Total Billable Time:  68 minutes (60 minutes billed and 8 minutes not billed for ice)     Visit Date: 3/6/2024    PTA Visit #: 0/5  Subjective     Patient reports:  "I realized that the last time I was here I had not taken my nerve medication."    He was compliant with home exercise prgram.  Response to previous treatment: Soreness in Left knee post treatment  Functional change: improving gait pattern     Pain: 3/10  Location: left knee      Objective      Objective Measures updated at progress report unless specified.     Treatment     Leno received the treatments listed below:      therapeutic exercises to develop strength, endurance, ROM, and flexibility.      Ther- Ex Reps   Nustep 8 minutes    Wedge 2 minutes    Hamstring Stretch 2 x 20"    Heelslides (seated)  3 x 10 (103 degrees)   LAQ's 3 x 10, 2#    Hamstring Curls 3 x 10 red    Hip adduction with ball  30 x 3" 3" *   Quad sets  3 x 10 3"  10 degrees active-assisted (cueing)*    SAQ's 3 x 10, 2# *    Straight Leg Raises 2 x 10  (with-held)    Heel Slides with ankle pump (supine)  3 x 10 (105 degrees)     Quad Sets with ESTIM  5 minutes in propped extension         Therapeutic activities to improve functional performance .  See flow-sheet below.     Ther-Act  Reps   Heel Raises 3 x 10    Mini Squats 20 x *    L hip " "& knee flexion 30 x 3"    Sit <> stands   2 x 10    Forward Step ups 20 x low step    Lateral Step ups      TKEs  2 x 10 red TB                              Cold pack: 8 minutes with elevation above heart level    Patient Education and Home Exercises       Education provided:   - Plan of Care, Home exercise program, Delayed onset muscle soreness     Written Home Exercises Provided: Patient instructed to cont prior HEP. Exercises were reviewed and Leno was able to demonstrate them prior to the end of the session.  Leno demonstrated fair  understanding of the education provided. See Electronic Medical Record under Patient Instructions for exercises provided during therapy sessions    Assessment     Quintin is a 58 y.o. male referred to outpatient Physical Therapy with a medical diagnosis of L TKA. Patient presents with L knee pain, decreased L knee active range of motion, L LE muscle strength, decreased L LE motor control and impaired gait mechanics. Patient's impairments are currently limiting his independence with functional mobility tasks and his activity tolerance. PT provided pt with proper setup on NuStep with positioning to improve left knee flexion and decrease stiffness prior to exercises. PT repositioned patient after four minutes to further increase L knee flexion ROM. Patient progressed through exercises with visual and verbal cues for proper alignment, form, and speed as well as decreased compensations. PT noted decreased L quad activation with quad set exercises. Patient had increased active L knee extension ROM following [place] ESTIM. Patient had no adverse effects noted to treatment tasks.         Leno Is progressing well towards his goals.   Patient prognosis is Good.     Patient will continue to benefit from skilled outpatient physical therapy to address the deficits listed in the problem list box on initial evaluation, provide pt/family education and to maximize pt's level of independence in the home " and community environment.     Patient's spiritual, cultural and educational needs considered and pt agreeable to plan of care and goals.     Anticipated barriers to physical therapy: compliance with Home exercise program     Goals: Short Term Goals: 3 weeks   Patient will independently complete Home exercise program with correct form.   Patient will report decreased pain to less than or equal to 2/10 for improved quality of life.   Patient will independently transfer sit to stand without UE use for increased independence with functional transfers.  Patient will ambulate community distances without assistive device for return to PLOF.       Long Term Goals: 6 weeks   Pt will increase L knee flexion to 120 degree, knee extension to 0 degrees, and quad lag to 0 degrees to allow patient normal gait pattern.  Pt will increase L knee strength to 4/5 to allow patient to safely return to prior level of function   Patient will ambulate with increased gait speed to greater than or equal to 3.25 feet per second for increased safety with community ambulation   Patient will negotiate up and down stairs with a reciprocal pattern in preparation for return to stair climbing at work.   Patient will have increased FOTO score to greater than or equal to 50% indicating increased function.   Patient will complete 5 x sit<> less than or equal to 12 seconds indicating increased LE power for functional transfers.     Plan     Plan of care Certification: 2/15/2024 to 3/29/2024.  Outpatient Physical Therapy 3 times weekly for 6 weeks to include the following interventions: Electrical Stimulation unattended, Gait Training, Manual Therapy, Moist Heat/ Ice, Neuromuscular Re-ed, Patient Education, Therapeutic Activities, and Therapeutic Exercise.     Continue Plan of Care per PT orer to progress patient toward rehab goals.   Francisco Bell, PT, DPT     3/6/2024

## 2024-03-11 ENCOUNTER — CLINICAL SUPPORT (OUTPATIENT)
Dept: REHABILITATION | Facility: HOSPITAL | Age: 59
End: 2024-03-11
Payer: COMMERCIAL

## 2024-03-11 DIAGNOSIS — M17.12 PRIMARY OSTEOARTHRITIS OF LEFT KNEE: Primary | ICD-10-CM

## 2024-03-11 PROCEDURE — 97110 THERAPEUTIC EXERCISES: CPT | Mod: CQ

## 2024-03-11 PROCEDURE — 97530 THERAPEUTIC ACTIVITIES: CPT | Mod: CQ

## 2024-03-11 NOTE — PROGRESS NOTES
"[facility]   Physical Therapy Treatment Note      Name: Quintin Sandoval  Clinic Number: [id number]    Therapy Diagnosis:        Encounter Diagnosis   Name Primary?    Primary osteoarthritis of left knee          Physician: Jairo Gilmore MD     Physician Orders: PT Eval and Treat   Medical Diagnosis from Referral: L TKR   Evaluation Date: 2/15/2024  Authorization Period Expiration: 2/14/2025  Plan of Care Expiration: 3/29/2024  Progress Note Due: 3/29/2024  Date of Surgery: 2/12/2024  Visit # / Visits authorized: 10/18   FOTO: to be completed on visit 6      Precautions: Standard      Time In: 8:50  Time Out: 9:35  Total Billable Time:  45 minutes     Visit Date: 3/11/2024    PTA Visit #: 1/5  Subjective     Patient reports:  "I thought about why I was hurting so bad the other day and I remembered I had not taken my nerve pill that day, but I've had it today and I'm not hurting near as bad".       He was compliant with home exercise prgram.  Response to previous treatment: Soreness in Left knee post treatment  Functional change: improving gait pattern     Pain: 3/10  Location: left knee      Objective      Objective Measures updated at progress report unless specified.     Treatment     Leno received the treatments listed below:      therapeutic exercises to develop strength, endurance, ROM, and flexibility.      Ther- Ex Reps   Nustep 8 minutes    Wedge 2 minutes    Hamstring Stretch 2 x 20"    Heelslides (seated)  3 x 10 (110 degrees)   LAQ's 3 x 10, 2#    Hamstring Curls 3 x 10 red    Hip adduction with ball  30 x 3" 3" *   Quad sets  3 x 10 3"  0  degrees active-assisted (cueing)*    SAQ's 3 x 10, 2# *    Straight Leg Raises 2 x 10  (with-held)    Heel Slides with ankle pump (supine)  3 x 10 (108 degrees)     Quad Sets with ESTIM  5 minutes in propped extension         Therapeutic activities to improve functional performance .  See flow-sheet below.     Ther-Act  Reps   Heel " "Raises 3 x 10    Mini Squats 20 x *    L hip & knee flexion 30 x 3"    Sit <> stands   2 x 10    Forward Step ups 20 x low step    Lateral Step ups      TKEs  2 x 10 red TB                              Cold pack: 8 minutes with elevation above heart level    Patient Education and Home Exercises       Education provided:   - Plan of Care, Home exercise program, Delayed onset muscle soreness     Written Home Exercises Provided: Patient instructed to cont prior HEP. Exercises were reviewed and Leno was able to demonstrate them prior to the end of the session.  Leno demonstrated fair  understanding of the education provided. See Electronic Medical Record under Patient Instructions for exercises provided during therapy sessions    Assessment     Quintin is a 58 y.o. male referred to outpatient Physical Therapy with a medical diagnosis of L TKA. Patient presents with L knee pain, decreased L knee active range of motion, L LE muscle strength, decreased L LE motor control and impaired gait mechanics. Patient's impairments are currently limiting his independence with functional mobility tasks and his activity tolerance. LPTA provided pt with proper setup on NuStep with positioning to improve left knee flexion and decrease stiffness prior to exercises.  Patient progressed through exercises with min visual and verbal cues for proper alignment, form, and speed as well as decreased compensations.  LPTA noted moderate increase in L knee active range of motion extension to flexion at end of today's physical therapy treatment session.       Leno Is progressing well towards his goals.   Patient prognosis is Good.     Patient will continue to benefit from skilled outpatient physical therapy to address the deficits listed in the problem list box on initial evaluation, provide pt/family education and to maximize pt's level of independence in the home and community environment.     Patient's spiritual, cultural and educational needs " considered and pt agreeable to plan of care and goals.     Anticipated barriers to physical therapy: compliance with Home exercise program     Goals: Short Term Goals: 3 weeks   Patient will independently complete Home exercise program with correct form.   Patient will report decreased pain to less than or equal to 2/10 for improved quality of life.   Patient will independently transfer sit to stand without UE use for increased independence with functional transfers.  Patient will ambulate community distances without assistive device for return to PLOF.       Long Term Goals: 6 weeks   Pt will increase L knee flexion to 120 degree, knee extension to 0 degrees, and quad lag to 0 degrees to allow patient normal gait pattern.  Pt will increase L knee strength to 4/5 to allow patient to safely return to prior level of function   Patient will ambulate with increased gait speed to greater than or equal to 3.25 feet per second for increased safety with community ambulation   Patient will negotiate up and down stairs with a reciprocal pattern in preparation for return to stair climbing at work.   Patient will have increased FOTO score to greater than or equal to 50% indicating increased function.   Patient will complete 5 x sit<> less than or equal to 12 seconds indicating increased LE power for functional transfers.     Plan     Plan of care Certification: 2/15/2024 to 3/29/2024.  Outpatient Physical Therapy 3 times weekly for 6 weeks to include the following interventions: Electrical Stimulation unattended, Gait Training, Manual Therapy, Moist Heat/ Ice, Neuromuscular Re-ed, Patient Education, Therapeutic Activities, and Therapeutic Exercise.     Continue Plan of Care per PT orer to progress patient toward rehab goals.   STAN Reyna     3/11/2024

## 2024-03-13 ENCOUNTER — CLINICAL SUPPORT (OUTPATIENT)
Dept: REHABILITATION | Facility: HOSPITAL | Age: 59
End: 2024-03-13
Payer: COMMERCIAL

## 2024-03-13 DIAGNOSIS — M17.12 PRIMARY OSTEOARTHRITIS OF LEFT KNEE: Primary | ICD-10-CM

## 2024-03-13 PROCEDURE — 97530 THERAPEUTIC ACTIVITIES: CPT | Mod: CQ

## 2024-03-13 PROCEDURE — 97110 THERAPEUTIC EXERCISES: CPT | Mod: CQ

## 2024-03-13 NOTE — PROGRESS NOTES
"[facility]   Physical Therapy Treatment Note      Name: Quintin Sandoval  Clinic Number: [id number]    Therapy Diagnosis:        Encounter Diagnosis   Name Primary?    Primary osteoarthritis of left knee          Physician: Jairo Gilmore MD     Physician Orders: PT Eval and Treat   Medical Diagnosis from Referral: L TKR   Evaluation Date: 2/15/2024  Authorization Period Expiration: 2/14/2025  Plan of Care Expiration: 3/29/2024  Progress Note Due: 3/29/2024  Date of Surgery: 2/12/2024  Visit # / Visits authorized: 10/18   FOTO: to be completed on visit 6      Precautions: Standard      Time In: 8:56  Time Out: 9:34  Total Billable Time:  38 minutes     Visit Date: 3/13/2024    PTA Visit #: 1/5  Subjective     Patient reports:  "Yesterday was rough, I rode in a car with not much leg space the entire time."    He was compliant with home exercise prgram.  Response to previous treatment: Soreness in Left knee post treatment  Functional change: improving gait pattern     Pain: 3/10  Location: left knee      Objective      Objective Measures updated at progress report unless specified.     Treatment     Leno received the treatments listed below:      therapeutic exercises to develop strength, endurance, ROM, and flexibility.      Ther- Ex Reps   Nustep 8 minutes    Wedge 2 minutes    Hamstring Stretch 2 x 20"    Heelslides (seated)  3 x 10 (110 degrees)   LAQ's 3 x 10, 2#    Hamstring Curls 3 x 10 red    Hip adduction with ball  30 x 3" 3"    Quad sets  3 x 10 3"  5 degrees    SAQ's 3 x 10, 2#    Straight Leg Raises 2 x 10  (with-held)    Heel Slides with ankle pump (supine)  3 x 10 (105 degrees)     Quad Sets with ESTIM  5 minutes in propped extension         Therapeutic activities to improve functional performance .  See flow-sheet below.     Ther-Act  Reps   Heel Raises 3 x 10    Mini Squats 20 x    L hip & knee flexion 30 x 3"    Sit <> stands   2 x 10    Forward Step ups 20 x high " step *   Lateral Step ups     TKEs  2 x 10 red TB                              NOT COMPLETED---Cold pack: 8 minutes with elevation above heart level    Patient Education and Home Exercises       Education provided:   - Plan of Care, Home exercise program, Delayed onset muscle soreness     Written Home Exercises Provided: Patient instructed to cont prior HEP. Exercises were reviewed and Leno was able to demonstrate them prior to the end of the session.  Leno demonstrated fair  understanding of the education provided. See Electronic Medical Record under Patient Instructions for exercises provided during therapy sessions    Assessment     Quintin is a 58 y.o. male referred to outpatient Physical Therapy with a medical diagnosis of L TKA. Patient presents with L knee pain, decreased L knee active range of motion, L LE muscle strength, decreased L LE motor control and impaired gait mechanics. Patient's impairments are currently limiting his independence with functional mobility tasks and his activity tolerance. LPTA provided pt with proper setup on rec bike with positioning to improve left knee flexion and decrease stiffness prior to exercises.  Pt began full revolutions with hip hiking compensations that subsided after 2 minutes of use secondary to decreased tightness. Some quad lag noted during straight leg raises. Pt progressed through exercises quickly with min visual and verbal cues for proper alignment, form, and speed as well as decreased compensations.  No adverse effects noted.       Lneo Is progressing well towards his goals.   Patient prognosis is Good.     Patient will continue to benefit from skilled outpatient physical therapy to address the deficits listed in the problem list box on initial evaluation, provide pt/family education and to maximize pt's level of independence in the home and community environment.     Patient's spiritual, cultural and educational needs considered and pt agreeable to plan of care  and goals.     Anticipated barriers to physical therapy: compliance with Home exercise program     Goals: Short Term Goals: 3 weeks   Patient will independently complete Home exercise program with correct form.   Patient will report decreased pain to less than or equal to 2/10 for improved quality of life.   Patient will independently transfer sit to stand without UE use for increased independence with functional transfers.  Patient will ambulate community distances without assistive device for return to PLOF.       Long Term Goals: 6 weeks   Pt will increase L knee flexion to 120 degree, knee extension to 0 degrees, and quad lag to 0 degrees to allow patient normal gait pattern.  Pt will increase L knee strength to 4/5 to allow patient to safely return to prior level of function   Patient will ambulate with increased gait speed to greater than or equal to 3.25 feet per second for increased safety with community ambulation   Patient will negotiate up and down stairs with a reciprocal pattern in preparation for return to stair climbing at work.   Patient will have increased FOTO score to greater than or equal to 50% indicating increased function.   Patient will complete 5 x sit<> less than or equal to 12 seconds indicating increased LE power for functional transfers.     Plan     Plan of care Certification: 2/15/2024 to 3/29/2024.  Outpatient Physical Therapy 3 times weekly for 6 weeks to include the following interventions: Electrical Stimulation unattended, Gait Training, Manual Therapy, Moist Heat/ Ice, Neuromuscular Re-ed, Patient Education, Therapeutic Activities, and Therapeutic Exercise.     Continue Plan of Care per PT orer to progress patient toward rehab goals.   STAN Alvarado   3/13/2024

## 2024-03-15 ENCOUNTER — CLINICAL SUPPORT (OUTPATIENT)
Dept: REHABILITATION | Facility: HOSPITAL | Age: 59
End: 2024-03-15
Payer: COMMERCIAL

## 2024-03-15 DIAGNOSIS — M17.12 PRIMARY OSTEOARTHRITIS OF LEFT KNEE: Primary | ICD-10-CM

## 2024-03-15 PROCEDURE — 97110 THERAPEUTIC EXERCISES: CPT | Mod: CQ

## 2024-03-15 PROCEDURE — 97530 THERAPEUTIC ACTIVITIES: CPT | Mod: CQ

## 2024-03-15 NOTE — PROGRESS NOTES
"[facility]   Physical Therapy Treatment Note      Name: Quintin Sandoval  Clinic Number: [id number]    Therapy Diagnosis:        Encounter Diagnosis   Name Primary?    Primary osteoarthritis of left knee          Physician: Jairo Gilmore MD     Physician Orders: PT Eval and Treat   Medical Diagnosis from Referral: L TKR   Evaluation Date: 2/15/2024  Authorization Period Expiration: 2/14/2025  Plan of Care Expiration: 3/29/2024  Progress Note Due: 3/29/2024  Date of Surgery: 2/12/2024  Visit # / Visits authorized: 13/18   FOTO: to be completed on visit 18     Precautions: Standard      Time In: 7:57 (later check in time)   Time Out:9:08  Total Billable Time: 71  minutes (63 minutes billed and 8 minutes not billed for ice)     Visit Date: 3/18/2024    PTA Visit #: 0/5  Subjective     Patient reports:  "It is the same stiffness and soreness but it will loosen up."     He was compliant with home exercise prgram.  Response to previous treatment: Soreness in Left knee post treatment  Functional change: improving gait pattern     Pain: 0/10 (soreness)   Location: left knee      Objective      Objective Measures updated at progress report unless specified.     Treatment     Leno received the treatments listed below:      therapeutic exercises to develop strength, endurance, ROM, and flexibility for 37 minutes including: See flowsheet below    *indicates increases in reps or resistance during this treatment session       Ther- Ex Reps   Nustep 8 minutes    Wedge 2 minutes    Hamstring Stretch 2 x 20"    Heelslides (seated)  3 x 10    LAQ's 3 x 10, 2#    Hamstring Curls 3 x 10 red    Hip adduction with ball  30 x 3" 3"    Quad sets  3 x 10 3"  4 degrees active range of motion; 0 degress Passive range of motion    Sidelying Hip ABD  2 x 10 *   Straight Leg Raises 2 x 10 *   Heel Slides with ankle pump (supine)  3 x 10 (115 degrees)     Quad Sets with ESTIM  5 minutes in propped extension -not " completed        Therapeutic activities to improve functional performance for  26 minutes .  See flow-sheet below.   *indicates increases in reps or resistance during this treatment session      Ther-Act  Reps   Heel Raises 2 x 10 on step *    Ball Squats 2 x 10 *   L hip & knee flexion 30 x 3 yelllow TB    Sit <> stands   2 x 10 no UE use*    Forward Step ups 20 x high step    Lateral Step ups 20 x high step    TKEs   3 x 10 red TB *    3-way hip  10 x each yellow TB B *    Reverse TKEs  2 x 10 red                  Cold pack: 8 minutes with elevation above heart level    Patient Education and Home Exercises       Education provided:   - Plan of Care, Home exercise program, Delayed onset muscle soreness     Written Home Exercises Provided: Patient instructed to cont prior HEP. Exercises were reviewed and Leno was able to demonstrate them prior to the end of the session.  Leno demonstrated fair  understanding of the education provided. See Electronic Medical Record under Patient Instructions for exercises provided during therapy sessions    Assessment     Quintin is a 58 y.o. male referred to outpatient Physical Therapy with a medical diagnosis of L TKA. Patient presents with L knee pain, decreased L knee active range of motion, L LE muscle strength, decreased L LE motor control and impaired gait mechanics. Patient's impairments are currently limiting his independence with functional mobility tasks and his activity tolerance. PT progressed patient's standing activities and initiated standing hip strengthening and knee stability exercises. Patient required visual and verbal cues for decreased speed and improved eccentric control to increase effectiveness of standing tasks and exercise. PT instructed patient in completion of straight leg raises and sidelying hip ABD dur to improved L knee extension active range of motion and quad control. No adverse effects noted to therapy tasks today.     Leno Is progressing well towards  his goals.   Patient prognosis is Good.     Patient will continue to benefit from skilled outpatient physical therapy to address the deficits listed in the problem list box on initial evaluation, provide pt/family education and to maximize pt's level of independence in the home and community environment.     Patient's spiritual, cultural and educational needs considered and pt agreeable to plan of care and goals.     Anticipated barriers to physical therapy: compliance with Home exercise program     Goals: Short Term Goals: 3 weeks   Patient will independently complete Home exercise program with correct form.   Patient will report decreased pain to less than or equal to 2/10 for improved quality of life.   Patient will independently transfer sit to stand without UE use for increased independence with functional transfers.  Patient will ambulate community distances without assistive device for return to PLOF.       Long Term Goals: 6 weeks   Pt will increase L knee flexion to 120 degree, knee extension to 0 degrees, and quad lag to 0 degrees to allow patient normal gait pattern.  Pt will increase L knee strength to 4/5 to allow patient to safely return to prior level of function   Patient will ambulate with increased gait speed to greater than or equal to 3.25 feet per second for increased safety with community ambulation   Patient will negotiate up and down stairs with a reciprocal pattern in preparation for return to stair climbing at work.   Patient will have increased FOTO score to greater than or equal to 50% indicating increased function.   Patient will complete 5 x sit<> less than or equal to 12 seconds indicating increased LE power for functional transfers.     Plan     Plan of care Certification: 2/15/2024 to 3/29/2024.  Outpatient Physical Therapy 3 times weekly for 6 weeks to include the following interventions: Electrical Stimulation unattended, Gait Training, Manual Therapy, Moist Heat/ Ice,  Neuromuscular Re-ed, Patient Education, Therapeutic Activities, and Therapeutic Exercise.     Continue Plan of Care per PT orer to progress patient toward rehab goals.   Francisco Bell, PT, DPT     3/15/2024

## 2024-03-15 NOTE — PROGRESS NOTES
"[facility]   Physical Therapy Treatment Note      Name: Quintin Sandoval  Clinic Number: [id number]    Therapy Diagnosis:        Encounter Diagnosis   Name Primary?    Primary osteoarthritis of left knee          Physician: Jairo Gilmore MD     Physician Orders: PT Eval and Treat   Medical Diagnosis from Referral: L TKR   Evaluation Date: 2/15/2024  Authorization Period Expiration: 2/14/2025  Plan of Care Expiration: 3/29/2024  Progress Note Due: 3/29/2024  Date of Surgery: 2/12/2024  Visit # / Visits authorized: 11/18   FOTO: to be completed on visit 6      Precautions: Standard      Time In: 8:05  Time Out: 9:09  Total Billable Time:  54 minutes     Visit Date: 3/15/2024    PTA Visit #: 2/5  Subjective     Patient reports:  "My knee is real stiff today." Patient states he was in the car for a long ride yesterday and last night night L knee was swollen and stiff.  Patient states he elevated L LE and put ice on his knee.       He was compliant with home exercise prgram.  Response to previous treatment: Soreness in Left knee post treatment  Functional change: improving gait pattern     Pain: 3/10  Location: left knee      Objective      Objective Measures updated at progress report unless specified.     Treatment     Leno received the treatments listed below:      therapeutic exercises to develop strength, endurance, ROM, and flexibility.      Ther- Ex Reps   Nustep 8 minutes    Wedge 2 minutes    Hamstring Stretch 2 x 20"    Heelslides (seated)  3 x 10 (108 degrees)   LAQ's 3 x 10, 2#    Hamstring Curls 3 x 10 red    Hip adduction with ball  30 x 3" 3"    Quad sets  3 x 10 3"  3 degrees active range of motion; 0 degress Passive range of motion    SAQ's 3 x 10, 2#    Straight Leg Raises 2 x 10  (with-held)    Heel Slides with ankle pump (supine)  3 x 10 (110 degrees)     Quad Sets with ESTIM  5 minutes in propped extension -not completed         Therapeutic activities to improve " "functional performance .  See flow-sheet below.     Ther-Act  Reps   Heel Raises 3 x 10    Mini Squats 20 x    L hip & knee flexion 30 x 3"    Sit <> stands   2 x 10    Forward Step ups 20 x high step *   Lateral Step ups 20 x high step    TKEs  2 x 10 red TB                              Cold pack: 8 minutes with elevation above heart level    Patient Education and Home Exercises       Education provided:   - Plan of Care, Home exercise program, Delayed onset muscle soreness     Written Home Exercises Provided: Patient instructed to cont prior HEP. Exercises were reviewed and Leno was able to demonstrate them prior to the end of the session.  Leno demonstrated fair  understanding of the education provided. See Electronic Medical Record under Patient Instructions for exercises provided during therapy sessions    Assessment     Quintin is a 58 y.o. male referred to outpatient Physical Therapy with a medical diagnosis of L TKA. Patient presents with L knee pain, decreased L knee active range of motion, L LE muscle strength, decreased L LE motor control and impaired gait mechanics. Patient's impairments are currently limiting his independence with functional mobility tasks and his activity tolerance.  LPTA noted improving Left knee stability and active range of motion during today's physical therapy treatment session.     Leno Is progressing well towards his goals.   Patient prognosis is Good.     Patient will continue to benefit from skilled outpatient physical therapy to address the deficits listed in the problem list box on initial evaluation, provide pt/family education and to maximize pt's level of independence in the home and community environment.     Patient's spiritual, cultural and educational needs considered and pt agreeable to plan of care and goals.     Anticipated barriers to physical therapy: compliance with Home exercise program     Goals: Short Term Goals: 3 weeks   Patient will independently complete Home " exercise program with correct form.   Patient will report decreased pain to less than or equal to 2/10 for improved quality of life.   Patient will independently transfer sit to stand without UE use for increased independence with functional transfers.  Patient will ambulate community distances without assistive device for return to PLOF.       Long Term Goals: 6 weeks   Pt will increase L knee flexion to 120 degree, knee extension to 0 degrees, and quad lag to 0 degrees to allow patient normal gait pattern.  Pt will increase L knee strength to 4/5 to allow patient to safely return to prior level of function   Patient will ambulate with increased gait speed to greater than or equal to 3.25 feet per second for increased safety with community ambulation   Patient will negotiate up and down stairs with a reciprocal pattern in preparation for return to stair climbing at work.   Patient will have increased FOTO score to greater than or equal to 50% indicating increased function.   Patient will complete 5 x sit<> less than or equal to 12 seconds indicating increased LE power for functional transfers.     Plan     Plan of care Certification: 2/15/2024 to 3/29/2024.  Outpatient Physical Therapy 3 times weekly for 6 weeks to include the following interventions: Electrical Stimulation unattended, Gait Training, Manual Therapy, Moist Heat/ Ice, Neuromuscular Re-ed, Patient Education, Therapeutic Activities, and Therapeutic Exercise.     Continue Plan of Care per PT orer to progress patient toward rehab goals.   STAN Reyna   3/15/2024

## 2024-03-18 ENCOUNTER — CLINICAL SUPPORT (OUTPATIENT)
Dept: REHABILITATION | Facility: HOSPITAL | Age: 59
End: 2024-03-18
Payer: COMMERCIAL

## 2024-03-18 DIAGNOSIS — M17.12 PRIMARY OSTEOARTHRITIS OF LEFT KNEE: Primary | ICD-10-CM

## 2024-03-18 PROCEDURE — 97110 THERAPEUTIC EXERCISES: CPT

## 2024-03-18 PROCEDURE — 97530 THERAPEUTIC ACTIVITIES: CPT

## 2024-03-20 ENCOUNTER — CLINICAL SUPPORT (OUTPATIENT)
Dept: REHABILITATION | Facility: HOSPITAL | Age: 59
End: 2024-03-20
Payer: COMMERCIAL

## 2024-03-20 DIAGNOSIS — M17.12 PRIMARY OSTEOARTHRITIS OF LEFT KNEE: Primary | ICD-10-CM

## 2024-03-20 PROCEDURE — 97530 THERAPEUTIC ACTIVITIES: CPT | Mod: CQ

## 2024-03-20 PROCEDURE — 97110 THERAPEUTIC EXERCISES: CPT | Mod: CQ

## 2024-03-20 NOTE — PROGRESS NOTES
"[facility]   Physical Therapy Treatment Note      Name: Quintin Sandoval  Clinic Number: [id number]    Therapy Diagnosis:        Encounter Diagnosis   Name Primary?    Primary osteoarthritis of left knee          Physician: Jairo Gilmore MD     Physician Orders: PT Eval and Treat   Medical Diagnosis from Referral: L TKR   Evaluation Date: 2/15/2024  Authorization Period Expiration: 2/14/2025  Plan of Care Expiration: 3/29/2024  Progress Note Due: 3/29/2024  Date of Surgery: 2/12/2024  Visit # / Visits authorized: 15/18   FOTO: to be completed on visit 18     Precautions: Standard      Time In: 8:45  Time Out: 9:34  Total Billable Time: 49  minutes (63 minutes billed and 8 minutes not billed for ice)     Visit Date: 3/20/2024    PTA Visit #: 0/5  Subjective     Patient reports:  "It is the same stiffness and soreness but it will loosen up."     He was compliant with home exercise prgram.  Response to previous treatment: Soreness in Left knee post treatment  Functional change: improving gait pattern     Pain: 0/10 (soreness)   Location: left knee      Objective      Objective Measures updated at progress report unless specified.     Treatment     Leno received the treatments listed below:      therapeutic exercises to develop strength, endurance, ROM, and flexibility for 23 minutes including: See flowsheet below    *indicates increases in reps or resistance during this treatment session       Ther- Ex Reps   Nustep 8 minutes    Wedge 2 minutes    Hamstring Stretch 2 x 20"    Heelslides (seated)  3 x 10    LAQ's 3 x 10, 2#    Hamstring Curls 3 x 10 red    Hip adduction with ball  20 x 3"    Quad sets  3 x 10 3"  2 degrees active range of motion    Sidelying Hip ABD  2 x 10    Straight Leg Raises 2 x 10    Heel Slides with ankle pump (supine)  3 x 10 (108 degrees)        Therapeutic activities to improve functional performance for  26 minutes .  See flow-sheet below.   *indicates " increases in reps or resistance during this treatment session      Ther-Act  Reps   Heel Raises 2 x 10 on step    Ball Squats 2 x 10    L hip & knee flexion 30 x 3 yelllow TB    Sit <> stands   2 x 10 no UE use   Forward Step ups 20 x high step    Lateral Step ups 20 x high step    TKEs   3 x 10 red TB     3-way hip  10 x each yellow TB B     Reverse TKEs  2 x 10 red                  NOT COMPLETED---Cold pack: 8 minutes with elevation above heart level    Patient Education and Home Exercises       Education provided:   - Plan of Care, Home exercise program, Delayed onset muscle soreness     Written Home Exercises Provided: Patient instructed to cont prior HEP. Exercises were reviewed and Leno was able to demonstrate them prior to the end of the session.  Leno demonstrated fair  understanding of the education provided. See Electronic Medical Record under Patient Instructions for exercises provided during therapy sessions    Assessment     Quintin is a 58 y.o. male referred to outpatient Physical Therapy with a medical diagnosis of L TKA. Patient presents with L knee pain, decreased L knee active range of motion, L LE muscle strength, decreased L LE motor control and impaired gait mechanics. Patient's impairments are currently limiting his independence with functional mobility tasks and his activity tolerance. Pt tolerated standing activities with no c/o pain while displaying improved strength and carryover of exercises. Pt required visual and verbal cues for decreased speed and improved eccentric control to increase effectiveness of standing tasks and exercise. Pt displays improvement of active knee extension range of motion. Pt declined modalities. No adverse effects noted from tx.     Leno Is progressing well towards his goals.   Patient prognosis is Good.     Patient will continue to benefit from skilled outpatient physical therapy to address the deficits listed in the problem list box on initial evaluation, provide  pt/family education and to maximize pt's level of independence in the home and community environment.     Patient's spiritual, cultural and educational needs considered and pt agreeable to plan of care and goals.     Anticipated barriers to physical therapy: compliance with Home exercise program     Goals: Short Term Goals: 3 weeks   Patient will independently complete Home exercise program with correct form.   Patient will report decreased pain to less than or equal to 2/10 for improved quality of life.   Patient will independently transfer sit to stand without UE use for increased independence with functional transfers.  Patient will ambulate community distances without assistive device for return to PLOF.       Long Term Goals: 6 weeks   Pt will increase L knee flexion to 120 degree, knee extension to 0 degrees, and quad lag to 0 degrees to allow patient normal gait pattern.  Pt will increase L knee strength to 4/5 to allow patient to safely return to prior level of function   Patient will ambulate with increased gait speed to greater than or equal to 3.25 feet per second for increased safety with community ambulation   Patient will negotiate up and down stairs with a reciprocal pattern in preparation for return to stair climbing at work.   Patient will have increased FOTO score to greater than or equal to 50% indicating increased function.   Patient will complete 5 x sit<> less than or equal to 12 seconds indicating increased LE power for functional transfers.     Plan     Plan of care Certification: 2/15/2024 to 3/29/2024.  Outpatient Physical Therapy 3 times weekly for 6 weeks to include the following interventions: Electrical Stimulation unattended, Gait Training, Manual Therapy, Moist Heat/ Ice, Neuromuscular Re-ed, Patient Education, Therapeutic Activities, and Therapeutic Exercise.     Continue Plan of Care per PT orer to progress patient toward rehab goals.   STAN Alvarado  3/20/2024

## 2024-03-22 ENCOUNTER — CLINICAL SUPPORT (OUTPATIENT)
Dept: REHABILITATION | Facility: HOSPITAL | Age: 59
End: 2024-03-22
Payer: COMMERCIAL

## 2024-03-22 DIAGNOSIS — M17.12 PRIMARY OSTEOARTHRITIS OF LEFT KNEE: Primary | ICD-10-CM

## 2024-03-22 PROCEDURE — 97530 THERAPEUTIC ACTIVITIES: CPT | Mod: CQ

## 2024-03-22 PROCEDURE — 97110 THERAPEUTIC EXERCISES: CPT | Mod: CQ

## 2024-03-22 NOTE — PROGRESS NOTES
"[facility]   Physical Therapy Treatment Note      Name: Quintin Sandoval  Clinic Number: [id number]    Therapy Diagnosis:        Encounter Diagnosis   Name Primary?    Primary osteoarthritis of left knee          Physician: Jairo Gilmore MD     Physician Orders: PT Eval and Treat   Medical Diagnosis from Referral: L TKR   Evaluation Date: 2/15/2024  Authorization Period Expiration: 2/14/2025  Plan of Care Expiration: 3/29/2024  Progress Note Due: 3/29/2024  Date of Surgery: 2/12/2024  Visit # / Visits authorized: 16/18   FOTO: to be completed on visit 18     Precautions: Standard      Time In: 8:00  Time Out: 8:48  Total Billable Time: 48  minutes      Visit Date: 3/22/2024    PTA Visit #: 1/5  Subjective     Patient reports:  "I feel pretty good today."     He was compliant with home exercise prgram.  Response to previous treatment: Soreness in Left knee post treatment  Functional change: improving gait pattern     Pain: 0/10 (soreness)   Location: left knee      Objective      Objective Measures updated at progress report unless specified.     Treatment     Leno received the treatments listed below:      therapeutic exercises to develop strength, endurance, ROM, and flexibility for 18 minutes including: See flowsheet below    *indicates increases in reps or resistance during this treatment session       Ther- Ex Reps   Nustep 8 minutes    Wedge 2 minutes    Hamstring Stretch 2 x 20"    Heelslides (seated)  3 x 10    LAQ's 3 x 10, 2#    Hamstring Curls 3 x 10 red    Hip adduction with ball  20 x 3"    Quad sets  3 x 10 3"  0 degrees active range of motion    Sidelying Hip ABD  2 x 10    Straight Leg Raises 2 x 10    Heel Slides with ankle pump (supine)  3 x 10 (115 degrees active assisted)        Therapeutic activities to improve functional performance for  30 minutes .  See flow-sheet below.   *indicates increases in reps or resistance during this treatment session      Ther-Act  " Reps   Heel Raises 2 x 10 on step    Ball Squats 2 x 10    L hip & knee flexion 30 x 3 yelllow TB    Sit <> stands   2 x 10 no UE use   Forward Step ups 20 x high step    Lateral Step ups 20 x high step    TKEs   3 x 10 red TB     3-way hip  10 x each yellow TB B     Reverse TKEs  2 x 10 red                  NOT COMPLETED---Cold pack: 8 minutes with elevation above heart level    Patient Education and Home Exercises       Education provided:   - Plan of Care, Home exercise program, Delayed onset muscle soreness     Written Home Exercises Provided: Patient instructed to cont prior HEP. Exercises were reviewed and Leno was able to demonstrate them prior to the end of the session.  Leno demonstrated fair  understanding of the education provided. See Electronic Medical Record under Patient Instructions for exercises provided during therapy sessions    Assessment     Quintin is a 58 y.o. male referred to outpatient Physical Therapy with a medical diagnosis of L TKA. Patient presents with L knee pain, decreased L knee active range of motion, L LE muscle strength, decreased L LE motor control and impaired gait mechanics. Patient's impairments are currently limiting his independence with functional mobility tasks and his activity tolerance. Pt progressed through standing activities with improved mechanics. Pt required minimal cueing to decrease compensations and improve muscle activation during therapeutic exercises. Pt displays improvement of active knee extension range of motion. Pt declined modalities. No adverse effects noted from tx.     Leno Is progressing well towards his goals.   Patient prognosis is Good.     Patient will continue to benefit from skilled outpatient physical therapy to address the deficits listed in the problem list box on initial evaluation, provide pt/family education and to maximize pt's level of independence in the home and community environment.     Patient's spiritual, cultural and educational  needs considered and pt agreeable to plan of care and goals.     Anticipated barriers to physical therapy: compliance with Home exercise program     Goals: Short Term Goals: 3 weeks   Patient will independently complete Home exercise program with correct form.   Patient will report decreased pain to less than or equal to 2/10 for improved quality of life.   Patient will independently transfer sit to stand without UE use for increased independence with functional transfers.  Patient will ambulate community distances without assistive device for return to PLOF.       Long Term Goals: 6 weeks   Pt will increase L knee flexion to 120 degree, knee extension to 0 degrees, and quad lag to 0 degrees to allow patient normal gait pattern.  Pt will increase L knee strength to 4/5 to allow patient to safely return to prior level of function   Patient will ambulate with increased gait speed to greater than or equal to 3.25 feet per second for increased safety with community ambulation   Patient will negotiate up and down stairs with a reciprocal pattern in preparation for return to stair climbing at work.   Patient will have increased FOTO score to greater than or equal to 50% indicating increased function.   Patient will complete 5 x sit<> less than or equal to 12 seconds indicating increased LE power for functional transfers.     Plan     Plan of care Certification: 2/15/2024 to 3/29/2024.  Outpatient Physical Therapy 3 times weekly for 6 weeks to include the following interventions: Electrical Stimulation unattended, Gait Training, Manual Therapy, Moist Heat/ Ice, Neuromuscular Re-ed, Patient Education, Therapeutic Activities, and Therapeutic Exercise.     Continue Plan of Care per PT orer to progress patient toward rehab goals.   STAN Alvarado  3/22/2024

## 2024-03-25 ENCOUNTER — CLINICAL SUPPORT (OUTPATIENT)
Dept: REHABILITATION | Facility: HOSPITAL | Age: 59
End: 2024-03-25
Payer: COMMERCIAL

## 2024-03-25 DIAGNOSIS — M17.12 PRIMARY OSTEOARTHRITIS OF LEFT KNEE: Primary | ICD-10-CM

## 2024-03-25 PROCEDURE — 97110 THERAPEUTIC EXERCISES: CPT | Mod: CQ

## 2024-03-25 PROCEDURE — 97530 THERAPEUTIC ACTIVITIES: CPT | Mod: CQ

## 2024-03-25 PROCEDURE — 97010 HOT OR COLD PACKS THERAPY: CPT | Mod: CQ

## 2024-03-25 NOTE — PROGRESS NOTES
"[facility]   Physical Therapy Treatment Note      Name: Quintin Sandoval  Clinic Number: [id number]    Therapy Diagnosis:        Encounter Diagnosis   Name Primary?    Primary osteoarthritis of left knee          Physician: Jairo Gilmore MD     Physician Orders: PT Eval and Treat   Medical Diagnosis from Referral: L TKR   Evaluation Date: 2/15/2024  Authorization Period Expiration: 2/14/2025  Plan of Care Expiration: 3/22/2024  Progress Note Due: 3/22/2024  Date of Surgery: 2/12/2024  Visit # / Visits authorized: 17/26   FOTO: to be completed on visit 18     Precautions: Standard      Time In: 8:00  Time Out: 8:56  Total Billable Time: 48  minutes  (8 minutes not billable for CP)    Visit Date: 3/25/2024    PTA Visit #: 2/5  Subjective     Patient reports:  "My knee has been a little tender all weekend."     He was compliant with home exercise prgram.  Response to previous treatment: Soreness in Left knee post treatment  Functional change: improving gait pattern     Pain: 0/10 (soreness)   Location: left knee      Objective      Objective Measures updated at progress report unless specified.     Treatment     Leno received the treatments listed below:      therapeutic exercises to develop strength, endurance, ROM, and flexibility for 18 minutes including: See flowsheet below    *indicates increases in reps or resistance during this treatment session       Ther- Ex Reps   Nustep 8 minutes    Wedge 2 minutes    Hamstring Stretch 2 x 20"    Heelslides (seated)  3 x 10    LAQ's 3 x 10, 2#    Hamstring Curls 3 x 10 red    Hip adduction with ball  20 x 3"    Quad sets  3 x 10 3"  0 degrees active range of motion    Sidelying Hip ABD  2 x 10    Straight Leg Raises 2 x 10    Heel Slides with ankle pump (supine)  3 x 10 (115 degrees active assisted)        Therapeutic activities to improve functional performance for  30 minutes .  See flow-sheet below.   *indicates increases in reps or " resistance during this treatment session      Ther-Act  Reps   Heel Raises 2 x 10 on step    Touch N Go's 2 x 10 *   L hip & knee flexion 30 x 3 red TB *   Sit <> stands  2 x 10    Forward Step ups 20 x high step    Lateral Step ups 20 x high step    TKEs   3 x 10 red TB     3-way hip  10 x each red TB *    Reverse TKEs  2 x 10 red                  Cold pack: 8 minutes with elevation above heart level    Patient Education and Home Exercises       Education provided:   - Plan of Care, Home exercise program, Delayed onset muscle soreness     Written Home Exercises Provided: Patient instructed to cont prior HEP. Exercises were reviewed and Leno was able to demonstrate them prior to the end of the session.  Leno demonstrated fair  understanding of the education provided. See Electronic Medical Record under Patient Instructions for exercises provided during therapy sessions    Assessment     Quintin is a 58 y.o. male referred to outpatient Physical Therapy with a medical diagnosis of L TKA. Patient presents with L knee pain, decreased L knee active range of motion, L LE muscle strength, decreased L LE motor control and impaired gait mechanics. Patient's impairments are currently limiting his independence with functional mobility tasks and his activity tolerance. Pt progressed through standing activities with improved mechanics. LPTA increased resistance with improved strength and tolerance noted. Pt required some cueing to decrease compensations with touch n go's. Pt maintains full active knee extension range of motion. No adverse effects noted from tx.     Leno Is progressing well towards his goals.   Patient prognosis is Good.     Patient will continue to benefit from skilled outpatient physical therapy to address the deficits listed in the problem list box on initial evaluation, provide pt/family education and to maximize pt's level of independence in the home and community environment.     Patient's spiritual, cultural  and educational needs considered and pt agreeable to plan of care and goals.     Anticipated barriers to physical therapy: compliance with Home exercise program     Goals: Short Term Goals: 3 weeks   Patient will independently complete Home exercise program with correct form.   Patient will report decreased pain to less than or equal to 2/10 for improved quality of life.   Patient will independently transfer sit to stand without UE use for increased independence with functional transfers.  Patient will ambulate community distances without assistive device for return to PLOF.       Long Term Goals: 6 weeks   Pt will increase L knee flexion to 120 degree, knee extension to 0 degrees, and quad lag to 0 degrees to allow patient normal gait pattern.  Pt will increase L knee strength to 4/5 to allow patient to safely return to prior level of function   Patient will ambulate with increased gait speed to greater than or equal to 3.25 feet per second for increased safety with community ambulation   Patient will negotiate up and down stairs with a reciprocal pattern in preparation for return to stair climbing at work.   Patient will have increased FOTO score to greater than or equal to 50% indicating increased function.   Patient will complete 5 x sit<> less than or equal to 12 seconds indicating increased LE power for functional transfers.     Plan     Plan of care Certification: 2/15/2024 to 3/22/2024.  Outpatient Physical Therapy 2 times weekly for 4 weeks to include the following interventions: Electrical Stimulation unattended, Gait Training, Manual Therapy, Moist Heat/ Ice, Neuromuscular Re-ed, Patient Education, Therapeutic Activities, and Therapeutic Exercise.     Continue Plan of Care per PT orer to progress patient toward rehab goals.   STAN Alvarado  3/25/2024

## 2024-03-25 NOTE — PLAN OF CARE
[facility]  Physical Therapy Plan of Care Note     Name: Quintin Sandoval  Clinic Number: [id number]    Therapy Diagnosis:   Encounter Diagnosis   Name Primary?    Primary osteoarthritis of left knee Yes     Physician: Jairo Gilmore MD    Visit Date: 3/25/2024    Physician Orders: Eval and Treat  Medical Diagnosis from Referral: L TKR   Evaluation Date: 2/15/2024  Authorization Period Expiration: 2/14/2025  Plan of Care Expiration: 3/22/2024  Progress Note Due: 3/22/2024  Date of Surgery: 2/12/2024  Visit # / Visits authorized: 17/26   FOTO: to be completed on visit 18    Precautions: Standard  Functional Level Prior to Evaluation:  Independent     Subjective     Update: Patient reports less pain and improved activity tolerance.     Objective      Update: Patient demonstrates increased L knee ROM and improving strength. He continues to lack L knee flexion ROM and stability resulting in continued impaired movement mechanics.     Assessment     Update: Patient can benefit from continued skilled PT services to address remaining deficits and progress toward achieving rehab goals.     Previous Short Term Goals Status:     Patient will independently complete Home exercise program with correct form. -MET   Patient will report decreased pain to less than or equal to 2/10 for improved quality of life. -MET  Patient will independently transfer sit to stand without UE use for increased independence with functional transfers.-MET  Patient will ambulate community distances without assistive device for return to PLOF.  -MET   Long Term Goal Status: continue per initial plan of care.  Reasons for Recertification of Therapy:   Continue progressing toward rehab goals to promote return to PLOF in preparation for return to work.     GOALS  Pt will increase L knee flexion to 120 degree, knee extension to 0 degrees, and quad lag to 0 degrees to allow patient normal gait pattern.-Goal in progress 0-105  degrees  Pt will increase L knee strength to 4/5 to allow patient to safely return to prior level of function -Goal in progress 3+/5 within available ROM  Patient will ambulate with increased gait speed to greater than or equal to 3.25 feet per second for increased safety with community ambulation -Goal in progress  Patient will negotiate up and down stairs with a reciprocal pattern in preparation for return to stair climbing at work. -Goal in progress  Patient will have increased FOTO score to greater than or equal to 50% indicating increased function. -Goal in prp  Patient will complete 5 x sit<> less than or equal to 12 seconds indicating increased LE power for functional transfers. -Goal in progress     Plan     Updated Certification Period: 3/25/2024 to 4/26/2024   Recommended Treatment Plan: 2 times per week for 4 weeks:  Electrical Stimulation unattended, Gait Training, Manual Therapy, Moist Heat/ Ice, Neuromuscular Re-ed, Patient Education, Therapeutic Activities, and Therapeutic Exercise    Francisco Bell, PT, DPT

## 2024-04-02 ENCOUNTER — CLINICAL SUPPORT (OUTPATIENT)
Dept: REHABILITATION | Facility: HOSPITAL | Age: 59
End: 2024-04-02
Payer: COMMERCIAL

## 2024-04-02 DIAGNOSIS — M17.12 PRIMARY OSTEOARTHRITIS OF LEFT KNEE: Primary | ICD-10-CM

## 2024-04-02 PROCEDURE — 97530 THERAPEUTIC ACTIVITIES: CPT | Mod: CQ

## 2024-04-02 PROCEDURE — 97110 THERAPEUTIC EXERCISES: CPT | Mod: CQ

## 2024-04-02 PROCEDURE — 97010 HOT OR COLD PACKS THERAPY: CPT | Mod: CQ

## 2024-04-02 NOTE — PROGRESS NOTES
"[facility]   Physical Therapy Treatment Note      Name: Quintin Sandoval  Clinic Number: [id number]    Therapy Diagnosis:        Encounter Diagnosis   Name Primary?    Primary osteoarthritis of left knee          Physician: Jairo Gilmore MD     Physician Orders: PT Eval and Treat   Medical Diagnosis from Referral: L TKR   Evaluation Date: 2/15/2024  Authorization Period Expiration: 2/14/2025  Plan of Care Expiration: 3/22/2024  Progress Note Due: 3/22/2024  Date of Surgery: 2/12/2024  Visit # / Visits authorized: 17/26   FOTO: to be completed on visit 18     Precautions: Standard      Time In: 8:00  Time Out: 8:55  Total Billable Time: 55  minutes  (8 minutes not billable for CP)    Visit Date: 4/2/2024    PTA Visit #: 2/5  Subjective     Patient reports:  "I got kind of stiff over the weekend. I drove to [place] and sat around babysitting grandchildren."     He was compliant with home exercise prgram.  Response to previous treatment: Soreness in Left knee post treatment  Functional change: improving gait pattern     Pain: 0/10 (soreness)   Location: left knee      Objective      Objective Measures updated at progress report unless specified.     Treatment     Leno received the treatments listed below:      therapeutic exercises to develop strength, endurance, ROM, and flexibility for 17 minutes including: See flowsheet below    *indicates increases in reps or resistance during this treatment session       Ther- Ex Reps   Nustep 8 minutes    Wedge 2 minutes    Hamstring Stretch 2 x 20"    Heelslides (seated)  3 x 10    LAQ's 3 x 10, 2#    Hamstring Curls 3 x 10 red    Hip adduction with ball  20 x 3"    Quad sets  3 x 10 3"  0 degrees active range of motion    Sidelying Hip ABD  2 x 10    Straight Leg Raises 2 x 10    Heel Slides with ankle pump (supine)  3 x 10 (112 degrees active assisted)        Therapeutic activities to improve functional performance for  30 minutes .  See " flow-sheet below.   *indicates increases in reps or resistance during this treatment session      Ther-Act  Reps   Heel Raises 2 x 10 on step    Touch N Go's 2 x 10    L hip & knee flexion 30 x 3 red TB    Sit <> stands  2 x 10    Forward Step ups 20 x high step    Lateral Step ups 20 x high step    TKEs   3 x 10 red TB     3-way hip  10 x each red TB     Reverse TKEs  2 x 10 red                  Cold pack: 8 minutes with elevation above heart level    Patient Education and Home Exercises       Education provided:   - Plan of Care, Home exercise program, Delayed onset muscle soreness     Written Home Exercises Provided: Patient instructed to cont prior HEP. Exercises were reviewed and Leno was able to demonstrate them prior to the end of the session.  Leno demonstrated fair  understanding of the education provided. See Electronic Medical Record under Patient Instructions for exercises provided during therapy sessions    Assessment     Quintin is a 58 y.o. male referred to outpatient Physical Therapy with a medical diagnosis of L TKA. Patient presents with L knee pain, decreased L knee active range of motion, L LE muscle strength, decreased L LE motor control and impaired gait mechanics. Patient's impairments are currently limiting his independence with functional mobility tasks and his activity tolerance. Pt progressed through standing activities with improved mechanics and strength.  Pt required some cueing to decrease compensations with touch n go's. Pt maintains full active knee extension range of motion. No adverse effects noted from tx.     Leno Is progressing well towards his goals.   Patient prognosis is Good.     Patient will continue to benefit from skilled outpatient physical therapy to address the deficits listed in the problem list box on initial evaluation, provide pt/family education and to maximize pt's level of independence in the home and community environment.     Patient's spiritual, cultural and  educational needs considered and pt agreeable to plan of care and goals.     Anticipated barriers to physical therapy: compliance with Home exercise program     Goals: Short Term Goals: 3 weeks   Patient will independently complete Home exercise program with correct form.   Patient will report decreased pain to less than or equal to 2/10 for improved quality of life.   Patient will independently transfer sit to stand without UE use for increased independence with functional transfers.  Patient will ambulate community distances without assistive device for return to PLOF.       Long Term Goals: 6 weeks   Pt will increase L knee flexion to 120 degree, knee extension to 0 degrees, and quad lag to 0 degrees to allow patient normal gait pattern.  Pt will increase L knee strength to 4/5 to allow patient to safely return to prior level of function   Patient will ambulate with increased gait speed to greater than or equal to 3.25 feet per second for increased safety with community ambulation   Patient will negotiate up and down stairs with a reciprocal pattern in preparation for return to stair climbing at work.   Patient will have increased FOTO score to greater than or equal to 50% indicating increased function.   Patient will complete 5 x sit<> less than or equal to 12 seconds indicating increased LE power for functional transfers.     Plan     Plan of care Certification: 2/15/2024 to 3/22/2024.  Outpatient Physical Therapy 2 times weekly for 4 weeks to include the following interventions: Electrical Stimulation unattended, Gait Training, Manual Therapy, Moist Heat/ Ice, Neuromuscular Re-ed, Patient Education, Therapeutic Activities, and Therapeutic Exercise.     Continue Plan of Care per PT orer to progress patient toward rehab goals.   STAN Alvarado  4/2/2024

## 2024-04-04 NOTE — PROGRESS NOTES
"[facility]   Physical Therapy Treatment Note      Name: Quintin Sandoval  Clinic Number: [id number]    Therapy Diagnosis:        Encounter Diagnosis   Name Primary?    Primary osteoarthritis of left knee          Physician: Jairo Gilmore MD     Physician Orders: PT Eval and Treat   Medical Diagnosis from Referral: L TKR   Evaluation Date: 2/15/2024  Authorization Period Expiration: 2/14/2025  Plan of Care Expiration: 3/22/2024  Progress Note Due: 3/22/2024  Date of Surgery: 2/12/2024  Visit # / Visits authorized: 18/26   FOTO: to be completed on visit 18     Precautions: Standard      Time In: 8:00  Time Out: 8:58  Total Billable Time:  58 minutes  (50 minutes and 8 minutes not billable for CP)    Visit Date: 4/5/2024    PTA Visit #: 0/5  Subjective     Patient reports:  "My doctor says I am doing very good. He went ahead and scheduled to do my R knee in May."     He was compliant with home exercise prgram.  Response to previous treatment: Soreness in Left knee post treatment  Functional change: improving gait pattern     Pain: 0/10 (soreness)   Location: left knee      Objective      Objective Measures updated at progress report unless specified.     Treatment     Leno received the treatments listed below:      therapeutic exercises to develop strength, endurance, ROM, and flexibility for 28 minutes including: See flowsheet below    *indicates increases in reps or resistance during this treatment session    Ther- Ex Reps   Nustep 8 minutes    Wedge 2 minutes    Hamstring Stretch 2 x 20"    Heelslides (seated)  3 x 10    LAQ's 2 x 10 green TB    Hamstring Curls 2 x 10 green TB    Quad sets  3 x 10 3"  0 degrees active range of motion    4 way hip  2 x 10 1.5# *       Therapeutic activities to improve functional performance for  22 minutes .  See flow-sheet below.   *indicates increases in reps or resistance during this treatment session    Ther-Act  Reps   Heel Raises 2 x 10 on step "    Touch N Go's 2 x 10    L hip & knee flexion 30 x 3 red TB    Sit <> stands  2 x 10 with red TB *   Forward Step ups 20 x high step (heel type)*   Lateral Step ups 20 x high step (heel type) *    3-way hip (squatted) 10 x each red TB *    Ball Squats with hip ABD  2 x 10     Monster Walks  3 x red TB    Zombie Walks  3 x red TB      Cold pack: 8 minutes with elevation above heart level    Patient Education and Home Exercises       Education provided:   - Plan of Care, Home exercise program, Delayed onset muscle soreness     Written Home Exercises Provided: Patient instructed to cont prior HEP. Exercises were reviewed and Leno was able to demonstrate them prior to the end of the session.  Leno demonstrated fair  understanding of the education provided. See Electronic Medical Record under Patient Instructions for exercises provided during therapy sessions    Assessment     Quintin is a 58 y.o. male referred to outpatient Physical Therapy with a medical diagnosis of L TKA. Patient presents with L knee pain, decreased L knee active range of motion, L LE muscle strength, decreased L LE motor control and impaired gait mechanics. Patient's impairments are currently limiting his independence with functional mobility tasks and his activity tolerance. PT provided patient with proper setup on recbike to achieve musculoskeletal warmup. PT progressed patient to completing heels program on rec bike to ncrease L LE strength and endurance. PT progressed patient's standing tasks by focusing on increased motor control and stability with resistance bands and as described on flowsheet above. No adverse effects noted to PT.   Leno Is progressing well towards his goals.   Patient prognosis is Good.     Patient will continue to benefit from skilled outpatient physical therapy to address the deficits listed in the problem list box on initial evaluation, provide pt/family education and to maximize pt's level of independence in the home and  community environment.     Patient's spiritual, cultural and educational needs considered and pt agreeable to plan of care and goals.     Anticipated barriers to physical therapy: compliance with Home exercise program     Goals: Short Term Goals: 3 weeks   Patient will independently complete Home exercise program with correct form.   Patient will report decreased pain to less than or equal to 2/10 for improved quality of life.   Patient will independently transfer sit to stand without UE use for increased independence with functional transfers.  Patient will ambulate community distances without assistive device for return to PLOF.       Long Term Goals: 6 weeks   Pt will increase L knee flexion to 120 degree, knee extension to 0 degrees, and quad lag to 0 degrees to allow patient normal gait pattern.  Pt will increase L knee strength to 4/5 to allow patient to safely return to prior level of function   Patient will ambulate with increased gait speed to greater than or equal to 3.25 feet per second for increased safety with community ambulation   Patient will negotiate up and down stairs with a reciprocal pattern in preparation for return to stair climbing at work.   Patient will have increased FOTO score to greater than or equal to 50% indicating increased function.   Patient will complete 5 x sit<> less than or equal to 12 seconds indicating increased LE power for functional transfers.     Plan     Plan of care Certification: 2/15/2024 to 3/22/2024.  Outpatient Physical Therapy 2 times weekly for 4 weeks to include the following interventions: Electrical Stimulation unattended, Gait Training, Manual Therapy, Moist Heat/ Ice, Neuromuscular Re-ed, Patient Education, Therapeutic Activities, and Therapeutic Exercise.     Continue Plan of Care per PT orer to progress patient toward rehab goals.   Francisco Bell, PT, DPT     4/4/2024

## 2024-04-05 ENCOUNTER — CLINICAL SUPPORT (OUTPATIENT)
Dept: REHABILITATION | Facility: HOSPITAL | Age: 59
End: 2024-04-05
Payer: COMMERCIAL

## 2024-04-05 DIAGNOSIS — M17.12 PRIMARY OSTEOARTHRITIS OF LEFT KNEE: Primary | ICD-10-CM

## 2024-04-05 PROCEDURE — 97110 THERAPEUTIC EXERCISES: CPT

## 2024-04-05 PROCEDURE — 97530 THERAPEUTIC ACTIVITIES: CPT

## 2024-04-09 ENCOUNTER — CLINICAL SUPPORT (OUTPATIENT)
Dept: REHABILITATION | Facility: HOSPITAL | Age: 59
End: 2024-04-09
Payer: COMMERCIAL

## 2024-04-09 DIAGNOSIS — M17.12 PRIMARY OSTEOARTHRITIS OF LEFT KNEE: Primary | ICD-10-CM

## 2024-04-09 PROCEDURE — 97530 THERAPEUTIC ACTIVITIES: CPT | Mod: CQ

## 2024-04-09 PROCEDURE — 97110 THERAPEUTIC EXERCISES: CPT | Mod: CQ

## 2024-04-09 NOTE — PROGRESS NOTES
"[facility]   Physical Therapy Treatment Note      Name: Quintin Sandoval  Clinic Number: [id number]    Therapy Diagnosis:        Encounter Diagnosis   Name Primary?    Primary osteoarthritis of left knee          Physician: Jairo Gilmore MD     Physician Orders: PT Eval and Treat   Medical Diagnosis from Referral: L TKR   Evaluation Date: 2/15/2024  Authorization Period Expiration: 2/14/2025  Plan of Care Expiration: 3/22/2024  Progress Note Due: 3/22/2024  Date of Surgery: 2/12/2024  Visit # / Visits authorized: 20/26   FOTO: to be completed on visit 18     Precautions: Standard      Time In: 8:00  Time Out: 9:00  Total Billable Time:  60 minutes      Visit Date: 4/9/2024    PTA Visit #: 1/5  Subjective     Patient reports:  "I feel good today. My knee hurts with any kind of torque."     He was compliant with home exercise prgram.  Response to previous treatment: Soreness in Left knee post treatment  Functional change: improving gait pattern     Pain: 0/10 (soreness)   Location: left knee      Objective      Objective Measures updated at progress report unless specified.     Treatment     Leno received the treatments listed below:      therapeutic exercises to develop strength, endurance, ROM, and flexibility for 28 minutes including: See flowsheet below    *indicates increases in reps or resistance during this treatment session    Ther- Ex Reps   Nustep 8 minutes    Wedge 2 minutes    Hamstring Stretch 2 x 20"    Heelslides (seated)  3 x 10    LAQ's 2 x 10 green TB    Hamstring Curls 2 x 10 green TB    Quad sets  3 x 10 3"  0 degrees active range of motion    4 way hip  2 x 10 1.5# *       Therapeutic activities to improve functional performance for  32 minutes .  See flow-sheet below.   *indicates increases in reps or resistance during this treatment session    Ther-Act  Reps   Heel Raises 2 x 10 on step    Touch N Go's 2 x 10    L hip & knee flexion 30 x 3 red TB    Sit <> " "stands  2 x 10 with red TB    Forward Step ups 20 x high step (heel tap)   Lateral Step ups 20 x high step (heel tap)    3-way hip (squatted) 10 x each red TB    Ball Squats with hip ABD  2 x 10     Monster Walks  3 x red TB    Zombie Walks  3 x red TB      Cold pack: 8 minutes with elevation above heart level    Patient Education and Home Exercises       Education provided:   - Plan of Care, Home exercise program, Delayed onset muscle soreness     Written Home Exercises Provided: Patient instructed to cont prior HEP. Exercises were reviewed and Leno was able to demonstrate them prior to the end of the session.  Leno demonstrated fair  understanding of the education provided. See Electronic Medical Record under Patient Instructions for exercises provided during therapy sessions    Assessment     Quintin is a 58 y.o. male referred to outpatient Physical Therapy with a medical diagnosis of L TKA. Patient presents with L knee pain, decreased L knee active range of motion, L LE muscle strength, decreased L LE motor control and impaired gait mechanics. Patient's impairments are currently limiting his independence with functional mobility tasks and his activity tolerance. LPTA provided pt with proper setup on rec bike to achieve musculoskeletal warmup. Pt progressed through therapeutic activity with improved form and strength. Pt expressed some "torque like" pain with monster walks. No adverse effects noted.     Leno Is progressing well towards his goals.   Patient prognosis is Good.     Patient will continue to benefit from skilled outpatient physical therapy to address the deficits listed in the problem list box on initial evaluation, provide pt/family education and to maximize pt's level of independence in the home and community environment.     Patient's spiritual, cultural and educational needs considered and pt agreeable to plan of care and goals.     Anticipated barriers to physical therapy: compliance with Home " exercise program     Goals: Short Term Goals: 3 weeks   Patient will independently complete Home exercise program with correct form.   Patient will report decreased pain to less than or equal to 2/10 for improved quality of life.   Patient will independently transfer sit to stand without UE use for increased independence with functional transfers.  Patient will ambulate community distances without assistive device for return to PLOF.       Long Term Goals: 6 weeks   Pt will increase L knee flexion to 120 degree, knee extension to 0 degrees, and quad lag to 0 degrees to allow patient normal gait pattern.  Pt will increase L knee strength to 4/5 to allow patient to safely return to prior level of function   Patient will ambulate with increased gait speed to greater than or equal to 3.25 feet per second for increased safety with community ambulation   Patient will negotiate up and down stairs with a reciprocal pattern in preparation for return to stair climbing at work.   Patient will have increased FOTO score to greater than or equal to 50% indicating increased function.   Patient will complete 5 x sit<> less than or equal to 12 seconds indicating increased LE power for functional transfers.     Plan     Plan of care Certification: 2/15/2024 to 3/22/2024.  Outpatient Physical Therapy 2 times weekly for 4 weeks to include the following interventions: Electrical Stimulation unattended, Gait Training, Manual Therapy, Moist Heat/ Ice, Neuromuscular Re-ed, Patient Education, Therapeutic Activities, and Therapeutic Exercise.     Continue Plan of Care per PT orer to progress patient toward rehab goals.   STAN Alvarado  4/9/2024

## 2024-04-11 ENCOUNTER — CLINICAL SUPPORT (OUTPATIENT)
Dept: REHABILITATION | Facility: HOSPITAL | Age: 59
End: 2024-04-11
Payer: COMMERCIAL

## 2024-04-11 DIAGNOSIS — M17.12 PRIMARY OSTEOARTHRITIS OF LEFT KNEE: Primary | ICD-10-CM

## 2024-04-11 PROCEDURE — 97530 THERAPEUTIC ACTIVITIES: CPT | Mod: CQ

## 2024-04-11 PROCEDURE — 97110 THERAPEUTIC EXERCISES: CPT | Mod: CQ

## 2024-04-11 NOTE — PROGRESS NOTES
"[facility]   Physical Therapy Treatment Note      Name: Quintin Sandoval  Clinic Number: [id number]    Therapy Diagnosis:        Encounter Diagnosis   Name Primary?    Primary osteoarthritis of left knee          Physician: Jairo Gilmore MD     Physician Orders: PT Eval and Treat   Medical Diagnosis from Referral: L TKR   Evaluation Date: 2/15/2024  Authorization Period Expiration: 2/14/2025  Plan of Care Expiration: 3/22/2024  Progress Note Due: 3/22/2024  Date of Surgery: 2/12/2024  Visit # / Visits authorized: 21/26   FOTO: to be completed on visit 18     Precautions: Standard      Time In: 8:04  Time Out: 9:00  Total Billable Time:  56 minutes     Visit Date: 4/11/2024    PTA Visit #: 2/5  Subjective     Patient reports:  No new complaints.  Less complaints of stiffness in Left knee.     He was compliant with home exercise prgram.  Response to previous treatment: Soreness in Left knee post treatment  Functional change: improving gait pattern     Pain: 0/10 (soreness)   Location: left knee      Objective      Objective Measures updated at progress report unless specified.     Treatment     Leno received the treatments listed below:      therapeutic exercises to develop strength, endurance, ROM, and flexibility.  See flowsheet below    *indicates increases in reps or resistance during this treatment session    Ther- Ex Reps   Nustep 8 minutes    Wedge 2 minutes    Hamstring Stretch 2 x 20"    Heelslides (supine)  3 x 10  (118 degrees) *    LAQ's 2 x 10 green TB    Hamstring Curls 2 x 10 green TB    Quad sets  3 x 10 3"  0 degrees active range of motion    4 way hip  2 x 10 1.5# *       Therapeutic activities to improve functional performance.   See flow-sheet below.   *indicates increases in reps or resistance during this treatment session    Ther-Act  Reps   Heel Raises 2 x 10 on step    Touch N Go's 2 x 10    L hip & knee flexion 30 x 3 red TB    Sit <> stands  2 x 10 with red " TB    Forward Step ups 20 x high step (heel tap)   Lateral Step ups 20 x high step (heel tap)    3 way lunge  8 x * f   3-way hip (squatted) 10 x each red TB    Ball Squats with hip ABD  2 x 10     Monster Walks  3 x red TB    Zombie Walks  3 x red TB      Patient declined:  Cold pack: 8 minutes with elevation above heart level    Patient Education and Home Exercises       Education provided:   - Plan of Care, Home exercise program, Delayed onset muscle soreness     Written Home Exercises Provided: Patient instructed to cont prior HEP. Exercises were reviewed and Leno was able to demonstrate them prior to the end of the session.  Leno demonstrated fair  understanding of the education provided. See Electronic Medical Record under Patient Instructions for exercises provided during therapy sessions    Assessment     Quintin is a 58 y.o. male referred to outpatient Physical Therapy with a medical diagnosis of L TKA. Patient presents with L knee pain, decreased L knee active range of motion, L LE muscle strength, decreased L LE motor control and impaired gait mechanics. Patient's impairments are currently limiting his independence with functional mobility tasks and his activity tolerance. LPTA provided pt with proper setup on Nustep  to achieve musculoskeletal warmup. Patient able to progress through standing Therapeutic activities with proper alignment, speed of movement, count, and hold times.  Noted improved active range of motion L knee flexion at end of treatment session.  No adverse effects noted or reported.           Leno Is progressing well towards his goals.   Patient prognosis is Good.     Patient will continue to benefit from skilled outpatient physical therapy to address the deficits listed in the problem list box on initial evaluation, provide pt/family education and to maximize pt's level of independence in the home and community environment.     Patient's spiritual, cultural and educational needs considered  and pt agreeable to plan of care and goals.     Anticipated barriers to physical therapy: compliance with Home exercise program     Goals: Short Term Goals: 3 weeks   Patient will independently complete Home exercise program with correct form.   Patient will report decreased pain to less than or equal to 2/10 for improved quality of life.   Patient will independently transfer sit to stand without UE use for increased independence with functional transfers.  Patient will ambulate community distances without assistive device for return to PLOF.       Long Term Goals: 6 weeks   Pt will increase L knee flexion to 120 degree, knee extension to 0 degrees, and quad lag to 0 degrees to allow patient normal gait pattern.  Pt will increase L knee strength to 4/5 to allow patient to safely return to prior level of function   Patient will ambulate with increased gait speed to greater than or equal to 3.25 feet per second for increased safety with community ambulation   Patient will negotiate up and down stairs with a reciprocal pattern in preparation for return to stair climbing at work.   Patient will have increased FOTO score to greater than or equal to 50% indicating increased function.   Patient will complete 5 x sit<> less than or equal to 12 seconds indicating increased LE power for functional transfers.     Plan     Plan of care Certification: 2/15/2024 to 3/22/2024.  Outpatient Physical Therapy 2 times weekly for 4 weeks to include the following interventions: Electrical Stimulation unattended, Gait Training, Manual Therapy, Moist Heat/ Ice, Neuromuscular Re-ed, Patient Education, Therapeutic Activities, and Therapeutic Exercise.     Continue Plan of Care per PT orer to progress patient toward rehab goals.   Saritha PIERCE   4/11/2024

## 2024-04-16 ENCOUNTER — CLINICAL SUPPORT (OUTPATIENT)
Dept: REHABILITATION | Facility: HOSPITAL | Age: 59
End: 2024-04-16
Payer: COMMERCIAL

## 2024-04-16 DIAGNOSIS — M17.12 PRIMARY OSTEOARTHRITIS OF LEFT KNEE: Primary | ICD-10-CM

## 2024-04-16 PROCEDURE — 97530 THERAPEUTIC ACTIVITIES: CPT | Mod: CQ

## 2024-04-16 PROCEDURE — 97110 THERAPEUTIC EXERCISES: CPT | Mod: CQ

## 2024-04-16 NOTE — PROGRESS NOTES
"[facility]   Physical Therapy Treatment Note      Name: Quintin Sandoval  Clinic Number: [id number]    Therapy Diagnosis:        Encounter Diagnosis   Name Primary?    Primary osteoarthritis of left knee          Physician: Jairo Gilmore MD     Physician Orders: PT Eval and Treat   Medical Diagnosis from Referral: L TKR   Evaluation Date: 2/15/2024  Authorization Period Expiration: 2/14/2025  Plan of Care Expiration: 3/22/2024  Progress Note Due: 3/22/2024  Date of Surgery: 2/12/2024  Visit # / Visits authorized: 22/26   FOTO: to be completed on visit 24     Precautions: Standard      Time In: 8:03  Time Out: 8:48  Total Billable Time:  45 minutes     Visit Date: 4/16/2024    PTA Visit #: 3/5  Subjective     Patient reports:  No new complaints.  Less complaints of stiffness in Left knee.     He was compliant with home exercise prgram.  Response to previous treatment: Soreness in Left knee post treatment  Functional change: improving gait pattern     Pain: 0/10 (soreness)   Location: left knee      Objective      Objective Measures updated at progress report unless specified.     Treatment     Leno received the treatments listed below:      therapeutic exercises to develop strength, endurance, ROM, and flexibility.  See flowsheet below    *indicates increases in reps or resistance during this treatment session    Ther- Ex Reps   Nustep 8 minutes    Wedge 2 minutes    Hamstring Stretch 2 x 20"    Heelslides (supine)  3 x 10  (118 degrees)    LAQ's 2 x 10 green TB    Hamstring Curls 2 x 10 green TB    Quad sets  3 x 10 3"  0 degrees active range of motion    4 way hip  2 x 10 1.5#        Therapeutic activities to improve functional performance.   See flow-sheet below.   *indicates increases in reps or resistance during this treatment session    Ther-Act  Reps   Heel Raises 2 x 10 on step    Touch N Go's 2 x 10    L hip & knee flexion 30 x 3 red TB    Sit <> stands  2 x 10 with red TB  "   Forward Step ups 20 x high step (heel tap)   Lateral Step ups 20 x high step (heel tap)    3 way lunge  8 x *    3-way hip (squatted) 10 x each red TB    Ball Squats with hip ABD  2 x 10     Monster Walks  3 x red TB    Zombie Walks  3 x red TB      Patient declined:  Cold pack: 8 minutes with elevation above heart level    Patient Education and Home Exercises       Education provided:   - Plan of Care, Home exercise program, Delayed onset muscle soreness     Written Home Exercises Provided: Patient instructed to cont prior HEP. Exercises were reviewed and Leno was able to demonstrate them prior to the end of the session.  Leno demonstrated fair  understanding of the education provided. See Electronic Medical Record under Patient Instructions for exercises provided during therapy sessions    Assessment     Quintin is a 58 y.o. male referred to outpatient Physical Therapy with a medical diagnosis of L TKA. Patient presents with L knee pain, decreased L knee active range of motion, L LE muscle strength, decreased L LE motor control and impaired gait mechanics. Patient's impairments are currently limiting his independence with functional mobility tasks and his activity tolerance. LPTA provided pt with proper setup on rec bike  to achieve musculoskeletal warmup. Pt able to progress through standing Therapeutic activities with proper alignment, speed of movement, count, and hold times.  Pt displays improved activity tolerance with increased resistance with exercises.  No adverse effects noted or reported.       Leno Is progressing well towards his goals.   Patient prognosis is Good.     Patient will continue to benefit from skilled outpatient physical therapy to address the deficits listed in the problem list box on initial evaluation, provide pt/family education and to maximize pt's level of independence in the home and community environment.     Patient's spiritual, cultural and educational needs considered and pt  agreeable to plan of care and goals.     Anticipated barriers to physical therapy: compliance with Home exercise program     Goals: Short Term Goals: 3 weeks   Patient will independently complete Home exercise program with correct form.   Patient will report decreased pain to less than or equal to 2/10 for improved quality of life.   Patient will independently transfer sit to stand without UE use for increased independence with functional transfers.  Patient will ambulate community distances without assistive device for return to PLOF.       Long Term Goals: 6 weeks   Pt will increase L knee flexion to 120 degree, knee extension to 0 degrees, and quad lag to 0 degrees to allow patient normal gait pattern.  Pt will increase L knee strength to 4/5 to allow patient to safely return to prior level of function   Patient will ambulate with increased gait speed to greater than or equal to 3.25 feet per second for increased safety with community ambulation   Patient will negotiate up and down stairs with a reciprocal pattern in preparation for return to stair climbing at work.   Patient will have increased FOTO score to greater than or equal to 50% indicating increased function.   Patient will complete 5 x sit<> less than or equal to 12 seconds indicating increased LE power for functional transfers.     Plan     Plan of care Certification: 2/15/2024 to 3/22/2024.  Outpatient Physical Therapy 2 times weekly for 4 weeks to include the following interventions: Electrical Stimulation unattended, Gait Training, Manual Therapy, Moist Heat/ Ice, Neuromuscular Re-ed, Patient Education, Therapeutic Activities, and Therapeutic Exercise.     Continue Plan of Care per PT orer to progress patient toward rehab goals.   STAN Alvarado   4/16/2024

## 2024-04-18 ENCOUNTER — CLINICAL SUPPORT (OUTPATIENT)
Dept: REHABILITATION | Facility: HOSPITAL | Age: 59
End: 2024-04-18
Payer: COMMERCIAL

## 2024-04-18 DIAGNOSIS — M17.12 PRIMARY OSTEOARTHRITIS OF LEFT KNEE: Primary | ICD-10-CM

## 2024-04-18 PROCEDURE — 97110 THERAPEUTIC EXERCISES: CPT | Mod: CQ

## 2024-04-18 PROCEDURE — 97530 THERAPEUTIC ACTIVITIES: CPT | Mod: CQ

## 2024-04-18 NOTE — PROGRESS NOTES
"[facility]   Physical Therapy Treatment Note      Name: Quintin Sandoval  Clinic Number: [id number]    Therapy Diagnosis:        Encounter Diagnosis   Name Primary?    Primary osteoarthritis of left knee          Physician: Jairo Gilmore MD     Physician Orders: PT Eval and Treat   Medical Diagnosis from Referral: L TKR   Evaluation Date: 2/15/2024  Authorization Period Expiration: 2/14/2025  Plan of Care Expiration: 3/22/2024  Progress Note Due: 3/22/2024  Date of Surgery: 2/12/2024  Visit # / Visits authorized: 23/26   FOTO: to be completed on visit 24     Precautions: Standard      Time In: 8:05  Time Out: 8:53  Total Billable Time:  48 minutes     Visit Date: 4/18/2024    PTA Visit #: 4/5  Subjective     Patient reports:  No new complaints.      He was compliant with home exercise prgram.  Response to previous treatment: Soreness in Left knee post treatment  Functional change: improving gait pattern     Pain: 0/10 (soreness)   Location: left knee      Objective      Objective Measures updated at progress report unless specified.     Treatment     Leno received the treatments listed below:      therapeutic exercises to develop strength, endurance, ROM, and flexibility.  See flowsheet below    *indicates increases in reps or resistance during this treatment session    Ther- Ex Reps   Nustep 8 minutes    Wedge 2 minutes    Hamstring Stretch 2 x 20"    Heelslides (supine)  3 x 10  (118 degrees)    LAQ's 2 x 10 green TB    Hamstring Curls 2 x 10 green TB    Quad sets  3 x 10 3"  0 degrees active range of motion    4 way hip  2 x 10 1.5#        Therapeutic activities to improve functional performance.   See flow-sheet below.   *indicates increases in reps or resistance during this treatment session    Ther-Act  Reps   Heel Raises 2 x 10 on step    Touch N Go's 2 x 10    L hip & knee flexion 30 x 3 red TB    Sit <> stands  2 x 10 with red TB    Forward Step ups 20 x high step (heel " tap)   Lateral Step ups 20 x high step (heel tap)    3 way lunge  8 x *    3-way hip (squatted) 10 x each red TB    Ball Squats with hip ABD  2 x 10     Monster Walks  3 x red TB    Zombie Walks  3 x red TB      Patient declined:  Cold pack: 8 minutes with elevation above heart level    Patient Education and Home Exercises       Education provided:   - Plan of Care, Home exercise program, Delayed onset muscle soreness     Written Home Exercises Provided: Patient instructed to cont prior HEP. Exercises were reviewed and Leno was able to demonstrate them prior to the end of the session.  Leno demonstrated fair  understanding of the education provided. See Electronic Medical Record under Patient Instructions for exercises provided during therapy sessions    Assessment     Quintin is a 58 y.o. male referred to outpatient Physical Therapy with a medical diagnosis of L TKA. Patient presents with L knee pain, decreased L knee active range of motion, L LE muscle strength, decreased L LE motor control and impaired gait mechanics. Patient's impairments are currently limiting his independence with functional mobility tasks and his activity tolerance. LPTA provided pt with proper setup on rec bike  to achieve musculoskeletal warmup. Pt able to progress through standing Therapeutic activities with proper alignment, speed of movement, count, and hold times.  Pt displays minor decrease in left knee flexion range of motion.  No adverse effects noted or reported.       Leno Is progressing well towards his goals.   Patient prognosis is Good.     Patient will continue to benefit from skilled outpatient physical therapy to address the deficits listed in the problem list box on initial evaluation, provide pt/family education and to maximize pt's level of independence in the home and community environment.     Patient's spiritual, cultural and educational needs considered and pt agreeable to plan of care and goals.     Anticipated barriers  to physical therapy: compliance with Home exercise program     Goals: Short Term Goals: 3 weeks   Patient will independently complete Home exercise program with correct form.   Patient will report decreased pain to less than or equal to 2/10 for improved quality of life.   Patient will independently transfer sit to stand without UE use for increased independence with functional transfers.  Patient will ambulate community distances without assistive device for return to PLOF.       Long Term Goals: 6 weeks   Pt will increase L knee flexion to 120 degree, knee extension to 0 degrees, and quad lag to 0 degrees to allow patient normal gait pattern.  Pt will increase L knee strength to 4/5 to allow patient to safely return to prior level of function   Patient will ambulate with increased gait speed to greater than or equal to 3.25 feet per second for increased safety with community ambulation   Patient will negotiate up and down stairs with a reciprocal pattern in preparation for return to stair climbing at work.   Patient will have increased FOTO score to greater than or equal to 50% indicating increased function.   Patient will complete 5 x sit<> less than or equal to 12 seconds indicating increased LE power for functional transfers.     Plan     Plan of care Certification: 2/15/2024 to 3/22/2024.  Outpatient Physical Therapy 2 times weekly for 4 weeks to include the following interventions: Electrical Stimulation unattended, Gait Training, Manual Therapy, Moist Heat/ Ice, Neuromuscular Re-ed, Patient Education, Therapeutic Activities, and Therapeutic Exercise.     Continue Plan of Care per PT orer to progress patient toward rehab goals.   STAN Alvarado   4/18/2024

## 2024-04-23 ENCOUNTER — CLINICAL SUPPORT (OUTPATIENT)
Dept: REHABILITATION | Facility: HOSPITAL | Age: 59
End: 2024-04-23
Payer: COMMERCIAL

## 2024-04-23 DIAGNOSIS — M17.12 PRIMARY OSTEOARTHRITIS OF LEFT KNEE: Primary | ICD-10-CM

## 2024-04-23 PROCEDURE — 97530 THERAPEUTIC ACTIVITIES: CPT

## 2024-04-23 PROCEDURE — 97110 THERAPEUTIC EXERCISES: CPT

## 2024-04-23 PROCEDURE — 97140 MANUAL THERAPY 1/> REGIONS: CPT

## 2024-04-23 NOTE — PROGRESS NOTES
"[facility]   Physical Therapy Treatment Note      Name: Quintin Sandoval  Clinic Number: [id number]    Therapy Diagnosis:        Encounter Diagnosis   Name Primary?    Primary osteoarthritis of left knee          Physician: Jairo Gilmore MD     Physician Orders: PT Eval and Treat   Medical Diagnosis from Referral: L TKR   Evaluation Date: 2/15/2024  Authorization Period Expiration: 2/14/2025  Plan of Care Expiration: 3/22/2024  Progress Note Due: 3/22/2024  Date of Surgery: 2/12/2024  Visit # / Visits authorized: 24/26   FOTO: to be completed on visit 26     Precautions: Standard      Time In: 8:00  Time Out: 9:05  Total Billable Time: 65 minutes (8 minutes not billed for ice)    Visit Date: 4/23/2024    PTA Visit #: 0/5  Subjective     Patient reports:  "I was on my feet for a long period of time this weekend so now I have some tenderness on the outside of my knee."     He was compliant with home exercise prgram.  Response to previous treatment: Soreness in Left knee post treatment  Functional change: improving gait pattern     Pain: 3/10 (soreness)   Location: left knee      Objective      Objective Measures updated at progress report unless specified.     Treatment     Leno received the treatments listed below:      therapeutic exercises to develop strength, endurance, ROM, and flexibility.  See flowsheet below    *indicates increases in reps or resistance during this treatment session    Ther- Ex Reps   Nustep 8 minutes    Wedge 2 minutes    Hamstring Stretch 2 x 20"    Heelslides (supine)  3 x 10  (118 degrees)    LAQ's 2 x 10 green TB    Hamstring Curls 2 x 10 green TB    Quad sets  3 x 10 3"  0 degrees active range of motion    4 way hip  2 x 10 1.5#        Therapeutic activities to improve functional performance.   See flow-sheet below.   *indicates increases in reps or resistance during this treatment session    Ther-Act  Reps   Heel Raises 2 x 10 on step    Touch N Go's 2 " x 10    L hip & knee flexion 30 x 3 green TB *   Sit <> stands  2 x 10 with green TB *   Forward Step ups 20 x high step (heel tap)   Lateral Step ups 20 x high step (heel tap)    3 way lunge  8 x    3-way hip (squatted) 10 x each red TB    Ball Squats with hip ABD  2 x 10 green TB     Monster Walks  3 x red TB    Zombie Walks  3 x red TB      Manual Therapy for 8 minutes: PT completed IASTM to L IT band with theraroller and STM/MRF to L IT band to decreased tightness and adhesions especially at lateral aspect of L knee.     Cold pack: 8 minutes with elevation above heart level    Patient Education and Home Exercises       Education provided:   - Plan of Care, Home exercise program, Delayed onset muscle soreness     Written Home Exercises Provided: Patient instructed to cont prior HEP. Exercises were reviewed and Leon was able to demonstrate them prior to the end of the session.  Leno demonstrated fair  understanding of the education provided. See Electronic Medical Record under Patient Instructions for exercises provided during therapy sessions    Assessment     Quintin is a 58 y.o. male referred to outpatient Physical Therapy with a medical diagnosis of L TKA. Patient presents with L knee pain, decreased L knee active range of motion, L LE muscle strength, decreased L LE motor control and impaired gait mechanics. Patient's impairments are currently limiting his independence with functional mobility tasks and his activity tolerance. PT educated patient on IT band irritation caused by hip weakness with periods of increased activity during his warmup on rec bike. PT increased resistance with standing strengthening exercises as described on flowsheet above. Patient required occasional cues throughout treatment for improved motor control and speed to increase effectiveness of exercises.  PT noted improved tissue extensibility following manual therapy interventions. No adverse effects noted to PT treatment tasks.       Leno  Is progressing well towards his goals.   Patient prognosis is Good.     Patient will continue to benefit from skilled outpatient physical therapy to address the deficits listed in the problem list box on initial evaluation, provide pt/family education and to maximize pt's level of independence in the home and community environment.     Patient's spiritual, cultural and educational needs considered and pt agreeable to plan of care and goals.     Anticipated barriers to physical therapy: compliance with Home exercise program     Goals: Short Term Goals: 3 weeks   Patient will independently complete Home exercise program with correct form.   Patient will report decreased pain to less than or equal to 2/10 for improved quality of life.   Patient will independently transfer sit to stand without UE use for increased independence with functional transfers.  Patient will ambulate community distances without assistive device for return to PLOF.       Long Term Goals: 6 weeks   Pt will increase L knee flexion to 120 degree, knee extension to 0 degrees, and quad lag to 0 degrees to allow patient normal gait pattern.  Pt will increase L knee strength to 4/5 to allow patient to safely return to prior level of function   Patient will ambulate with increased gait speed to greater than or equal to 3.25 feet per second for increased safety with community ambulation   Patient will negotiate up and down stairs with a reciprocal pattern in preparation for return to stair climbing at work.   Patient will have increased FOTO score to greater than or equal to 50% indicating increased function.   Patient will complete 5 x sit<> less than or equal to 12 seconds indicating increased LE power for functional transfers.     Plan     Plan of care Certification: 2/15/2024 to 3/22/2024.  Outpatient Physical Therapy 2 times weekly for 4 weeks to include the following interventions: Electrical Stimulation unattended, Gait Training, Manual  Therapy, Moist Heat/ Ice, Neuromuscular Re-ed, Patient Education, Therapeutic Activities, and Therapeutic Exercise.     Continue Plan of Care per PT orer to progress patient toward rehab goals.   Francisco Bell, PT, DPT     4/23/2024

## 2024-04-25 ENCOUNTER — DOCUMENTATION ONLY (OUTPATIENT)
Dept: REHABILITATION | Facility: HOSPITAL | Age: 59
End: 2024-04-25
Payer: COMMERCIAL

## 2024-04-25 ENCOUNTER — CLINICAL SUPPORT (OUTPATIENT)
Dept: REHABILITATION | Facility: HOSPITAL | Age: 59
End: 2024-04-25
Payer: COMMERCIAL

## 2024-04-25 DIAGNOSIS — M17.12 PRIMARY OSTEOARTHRITIS OF LEFT KNEE: Primary | ICD-10-CM

## 2024-04-25 PROCEDURE — 97110 THERAPEUTIC EXERCISES: CPT | Mod: CQ

## 2024-04-25 PROCEDURE — 97530 THERAPEUTIC ACTIVITIES: CPT | Mod: CQ

## 2024-04-25 NOTE — PROGRESS NOTES
[facility]  PT Discharge Note    Name: Quintin Sandoval  Clinic Number: [id number]    Therapy Diagnosis:           Encounter Diagnosis   Name Primary?    Primary osteoarthritis of left knee          Physician: Jairo Gilmore MD  Physician Orders: PT Eval and Treat   Medical Diagnosis from Referral: L TKR   Evaluation Date: 2/15/2024  Date of Last visit: 4/25/2024  Total Visits Received: 25    ASSESSMENT      Patient demonstrates increased L knee active range of motion and strength resulting in increased functional mobility and activity tolerance with less pain.     Discharge reason: Patient has met all of his goals    Discharge FOTO Score: 64%    Goals:   Short Term Goals:   Patient will independently complete Home exercise program with correct form. -MET  Patient will report decreased pain to less than or equal to 2/10 for improved quality of life. -MET  Patient will independently transfer sit to stand without UE use for increased independence with functional transfers.-MET  Patient will ambulate community distances without assistive device for return to PLOF.  -MET     Long Term Goals:   Pt will increase L knee flexion to 120 degree, knee extension to 0 degrees, and quad lag to 0 degrees to allow patient normal gait pattern.-MET  Pt will increase L knee strength to 4/5 to allow patient to safely return to prior level of function   Patient will ambulate with increased gait speed to greater than or equal to 3.25 feet per second for increased safety with community ambulation -MET 3.61  Patient will negotiate up and down stairs with a reciprocal pattern in preparation for return to stair climbing at work. -MET  Patient will have increased FOTO score to greater than or equal to 50% indicating increased function. -MET 64%   Patient will complete 5 x sit<> less than or equal to 12 seconds indicating increased LE power for functional transfers.-MET 9 seconds     PLAN   This  patient is discharged from Physical Therapy      Francisco Bell, PT, DPT

## 2024-04-25 NOTE — PROGRESS NOTES
"[facility]   Physical Therapy Treatment Note      Name: Quintin Sandoval  Clinic Number: [id number]    Therapy Diagnosis:        Encounter Diagnosis   Name Primary?    Primary osteoarthritis of left knee          Physician: Jairo Gilmore MD     Physician Orders: PT Eval and Treat   Medical Diagnosis from Referral: L TKR   Evaluation Date: 2/15/2024  Authorization Period Expiration: 2/14/2025  Plan of Care Expiration: 3/22/2024  Progress Note Due: 3/22/2024  Date of Surgery: 2/12/2024  Visit # / Visits authorized: 25/26   FOTO: to be completed on visit 26     Precautions: Standard      Time In: 8:03  Time Out: 9:47  Total Billable Time: 44 minutes    Visit Date: 4/25/2024    PTA Visit #: 1/5  Subjective     Patient reports:  "I'm a little sore today."     He was compliant with home exercise prgram.  Response to previous treatment: Soreness in Left knee post treatment  Functional change: improving gait pattern     Pain: 3/10 (soreness)   Location: left knee      Objective      Objective Measures updated at progress report unless specified.     Treatment     Leno received the treatments listed below:      therapeutic exercises to develop strength, endurance, ROM, and flexibility.  See flowsheet below    *indicates increases in reps or resistance during this treatment session    Ther- Ex Reps   Nustep 8 minutes    Wedge 2 minutes    Hamstring Stretch 2 x 20"    Heelslides (supine)  3 x 10  (118 degrees)    LAQ's 2 x 10 green TB    Hamstring Curls 2 x 10 green TB    Quad sets  3 x 10 3"  0 degrees active range of motion    4 way hip  2 x 10 1.5#        Therapeutic activities to improve functional performance.   See flow-sheet below.   *indicates increases in reps or resistance during this treatment session    Ther-Act  Reps   Heel Raises 2 x 10 on step    Touch N Go's 2 x 10    L hip & knee flexion 30 x 3 green TB    Sit <> stands  2 x 10 with green TB    Forward Step ups 20 x high step " (heel tap)   Lateral Step ups 20 x high step (heel tap)    3 way lunge  8 x    3-way hip (squatted) 10 x each red TB    Ball Squats with hip ABD  2 x 10 green TB     Monster Walks  3 x green TB    Zombie Walks  3 x green TB          Patient Education and Home Exercises       Education provided:   - Plan of Care, Home exercise program, Delayed onset muscle soreness     Written Home Exercises Provided: Patient instructed to cont prior HEP. Exercises were reviewed and Leno was able to demonstrate them prior to the end of the session.  Leno demonstrated fair  understanding of the education provided. See Electronic Medical Record under Patient Instructions for exercises provided during therapy sessions    Assessment     Quintin is a 58 y.o. male referred to outpatient Physical Therapy with a medical diagnosis of L TKA. Patient presents with L knee pain, decreased L knee active range of motion, L LE muscle strength, decreased L LE motor control and impaired gait mechanics. Patient's impairments are currently limiting his independence with functional mobility tasks and his activity tolerance. Pt reports increased iliotibial band soreness following manual therapy from previous tx session. Tx intensity not increased secondary to recent increase. Pt required minimal cueing for decreased compensations throughout tx. No adverse effects noted or reported.       Leno Is progressing well towards his goals.   Patient prognosis is Good.     Patient will continue to benefit from skilled outpatient physical therapy to address the deficits listed in the problem list box on initial evaluation, provide pt/family education and to maximize pt's level of independence in the home and community environment.     Patient's spiritual, cultural and educational needs considered and pt agreeable to plan of care and goals.     Anticipated barriers to physical therapy: compliance with Home exercise program     Goals: Short Term Goals: 3 weeks   Patient  will independently complete Home exercise program with correct form.   Patient will report decreased pain to less than or equal to 2/10 for improved quality of life.   Patient will independently transfer sit to stand without UE use for increased independence with functional transfers.  Patient will ambulate community distances without assistive device for return to PLOF.       Long Term Goals: 6 weeks   Pt will increase L knee flexion to 120 degree, knee extension to 0 degrees, and quad lag to 0 degrees to allow patient normal gait pattern.  Pt will increase L knee strength to 4/5 to allow patient to safely return to prior level of function   Patient will ambulate with increased gait speed to greater than or equal to 3.25 feet per second for increased safety with community ambulation   Patient will negotiate up and down stairs with a reciprocal pattern in preparation for return to stair climbing at work.   Patient will have increased FOTO score to greater than or equal to 50% indicating increased function.   Patient will complete 5 x sit<> less than or equal to 12 seconds indicating increased LE power for functional transfers.     Plan     Plan of care Certification: 2/15/2024 to 3/22/2024.  Outpatient Physical Therapy 2 times weekly for 4 weeks to include the following interventions: Electrical Stimulation unattended, Gait Training, Manual Therapy, Moist Heat/ Ice, Neuromuscular Re-ed, Patient Education, Therapeutic Activities, and Therapeutic Exercise.     Plan to discharge per PT order.   STAN Alvarado  4/25/2024

## 2024-05-20 ENCOUNTER — TELEPHONE (OUTPATIENT)
Dept: FAMILY MEDICINE | Facility: CLINIC | Age: 59
End: 2024-05-20
Payer: COMMERCIAL

## 2024-05-20 DIAGNOSIS — E11.9 TYPE 2 DIABETES MELLITUS WITHOUT COMPLICATION, WITHOUT LONG-TERM CURRENT USE OF INSULIN: Primary | ICD-10-CM

## 2024-05-21 DIAGNOSIS — Z98.890 OTHER SPECIFIED POSTPROCEDURAL STATES: ICD-10-CM

## 2024-05-21 DIAGNOSIS — M17.9 OSTEOARTHRITIS OF KNEE, UNSPECIFIED: Primary | ICD-10-CM

## 2024-05-22 ENCOUNTER — CLINICAL SUPPORT (OUTPATIENT)
Dept: REHABILITATION | Facility: HOSPITAL | Age: 59
End: 2024-05-22
Payer: COMMERCIAL

## 2024-05-22 DIAGNOSIS — M17.9 OSTEOARTHRITIS OF KNEE, UNSPECIFIED: ICD-10-CM

## 2024-05-22 DIAGNOSIS — Z98.890 OTHER SPECIFIED POSTPROCEDURAL STATES: ICD-10-CM

## 2024-05-22 PROCEDURE — 97016 VASOPNEUMATIC DEVICE THERAPY: CPT

## 2024-05-22 PROCEDURE — 97110 THERAPEUTIC EXERCISES: CPT

## 2024-05-22 PROCEDURE — 97161 PT EVAL LOW COMPLEX 20 MIN: CPT

## 2024-05-22 NOTE — PLAN OF CARE
"  [facility]   Physical Therapy Initial Evaluation      Name: Quintin Sandoval  Clinic Number: [id number]    Therapy Diagnosis:   Encounter Diagnoses   Name Primary?    Osteoarthritis of knee, unspecified     Other specified postprocedural states         Physician: Jairo Gilmore MD    Physician Orders: PT Eval and Treat   Medical Diagnosis from Referral: R knee osteoarthritis   Evaluation Date: 5/22/2024  Authorization Period Expiration: 5/21/2025  Plan of Care Expiration: 6/28/2024  Progress Note Due: 6/28/2024  Date of Surgery: 5/20/2024  Visit # / Visits authorized: 1/ 12   FOTO: to be completed on visit 6    Precautions: Standard     Time In: 9:36  Time Out: 10:35  Total Billable Time: 59 minutes    Subjective     Date of onset: 5/20/2024    History of current condition - Leno reports: R TKA on 5/20/2024 following chronic R knee pain for a couple of years. Patient has a L TKA on February 12,2024. Patient reports difficulty lifting R lower extremity into and out of bed or recliner.     Falls: None     Imaging: prior to surgery     Prior Therapy: None for this episode   Social History:  lives with their spouse  Occupation: Patient is employed at the local [employer]   Prior Level of Function: Independent   Current Level of Function: Modified independent     Pain:  Current 4/10, worst 8/10, best 0/10   Location: right knee    Description: Aching and Sharp  Aggravating Factors: Standing and Walking  Easing Factors: pain medication, ice    Patients goals: "get back to work"      Medical History:   Past Medical History:   Diagnosis Date    Hyperlipidemia        Surgical History:   Quintin Sandoval  has a past surgical history that includes Sinus surgery; Repair of meniscus of knee (Right); Hernia repair; and Angioplasty.    Medications:   Quintin has a current medication list which includes the following prescription(s): aspirin, atorvastatin, azelastine, b complex vitamins, blood " sugar diagnostic, blood-glucose meter, carvedilol, clindamycin, ezetimibe, fexofenadine, fluticasone propionate, glipizide, lancets, lisinopril, and montelukast.    Allergies:   Review of patient's allergies indicates:  No Known Allergies     Objective    Incisions: There is no signs of drainage or infection from patient's incision   Girth Measurements: Right 44 cm at midpatella Left 40.5 cm at midpatella    Range of motion:  Motion Right Left    Hip flexion  WITHIN FUNCTIONAL LIMITS  WITHIN FUNCTIONAL LIMITS   Hip extension  WITHIN FUNCTIONAL LIMITS  WITHIN FUNCTIONAL LIMITS   Hip abduction  WITHIN FUNCTIONAL LIMITS  WITHIN FUNCTIONAL LIMITS   Hip adduction  WITHIN FUNCTIONAL LIMITS  WITHIN FUNCTIONAL LIMITS   Knee extension  6 degrees from zero   WITHIN FUNCTIONAL LIMITS   Knee flexion  62 degrees   WITHIN FUNCTIONAL LIMITS   Ankle DF  WITHIN FUNCTIONAL LIMITS  WITHIN FUNCTIONAL LIMITS   Ankle PF  WITHIN FUNCTIONAL LIMITS  WITHIN FUNCTIONAL LIMITS   Ankle Inversion  WITHIN FUNCTIONAL LIMITS  WITHIN FUNCTIONAL LIMITS   Ankle Eversion  WITHIN FUNCTIONAL LIMITS  WITHIN FUNCTIONAL LIMITS       Manual muscle test   Muscle Right  Left    Hip flexion  MMT strength: 3+/5  MMT strength: 4+/5   Hip extension  MMT strength: 4/5  MMT strength: 4+/5   Hip abduction  MMT strength: 4/5  MMT strength: 4+/5   Hip adduction  MMT strength: 4/5  MMT strength: 4+/5   Knee extension  MMT strength: 2-/5  MMT strength: 4+/5   Knee flexion  MMT strength: 2-/5  MMT strength: 4+/5   Ankle DF  MMT strength: 4+/5  MMT strength: 4+/5   Ankle PF  MMT strength: 4+/5  MMT strength: 4+/5   Ankle inversion  MMT strength: 4+/5  MMT strength: 4+/5   Ankle eversion  MMT strength: 4+/5  MMT strength: 4+/5       Gait:  Weight bearing precautions: WBAT  Assistive device: rolling walker  Ambulation deviations: decreased R heel strike, decreased R toe off, decreased R stance time, antalgic gait pattern with increased WB on assistive device   Gait Speed:  "1.32 feet per second     Clinical Special Tests:  5 x sit<>stand: unable without UE use       Intake Outcome Measure for FOTO Survey    Therapist reviewed FOTO scores for Quintin Sandoval on 5/22/2024.   FOTO report - see Media section or FOTO account episode details.    Intake Score: 31%         Treatment     Total Treatment time (time-based codes) separate from Evaluation: 39 minutes     Leno received the treatments listed below:      therapeutic exercises to develop strength, endurance, ROM, and flexibility for 26 minutes including:    Ther- Ex Reps        Wedge    Hamstring Stretch    Heelslides    LAQ's    Hamstring Curls    Ankle Pumps  3 x 10    Glute Sets  3 x 10    Quad sets  3 x 10 3"    SAQ's 2 x 10 3"    Straight Leg Raises    Heel slides (supine)  2 x 10                Ther-Act    Heel Raises    Mini Squats    3 way hip    Sit <> stands     Forward Step ups    Lateral Step ups                            supervised modalities after being cleared for contradictions: Vasopneumatic device applied to R knee with elevation above heart level. Moderate pressure for 10 minutes with 3 minutes of setup     Patient Education and Home Exercises     Education provided:   - eval findings, plan of care, and Home exercise program     Written Home Exercises Provided: yes. Exercises were reviewed and Leno was able to demonstrate them prior to the end of the session.  Leno demonstrated good  understanding of the education provided. See EMR under Patient Instructions for exercises provided during therapy sessions.    Assessment     Quintin is a 58 y.o. male referred to outpatient Physical Therapy with a medical diagnosis of R TKA. Patient presents with R knee pain, decreased R knee ROM, impaired R lower extremity muscle strength and motor control, and edema. Patient's impairments are limiting his independence and activity tolerance with mobility tasks.     PT provided patient with visual and verbal cueing throughout session " for proper LE alignment, speed, motor control and decreased compensations with exercises. Patient progressed through exercises slowly due to pain and increased guarding. No adverse effects noted to therapy tasks.     Patient prognosis is Good.   Patient will benefit from skilled outpatient Physical Therapy to address the deficits stated above and in the chart below, provide patient /family education, and to maximize patientt's level of independence.     Plan of care discussed with patient: Yes  Patient's spiritual, cultural and educational needs considered and patient is agreeable to the plan of care and goals as stated below:     Anticipated Barriers for therapy: N/A    Medical Necessity is demonstrated by the following  History  Co-morbidities and personal factors that may impact the plan of care [x] LOW: no personal factors / co-morbidities  [] MODERATE: 1-2 personal factors / co-morbidities  [] HIGH: 3+ personal factors / co-morbidities    Moderate / High Support Documentation:   Co-morbidities affecting plan of care: N/A    Personal Factors:   no deficits     Examination  Body Structures and Functions, activity limitations and participation restrictions that may impact the plan of care [x] LOW: addressing 1-2 elements  [] MODERATE: 3+ elements  [] HIGH: 4+ elements (please support below)    Moderate / High Support Documentation: N/A     Clinical Presentation [x] LOW: stable  [] MODERATE: Evolving  [] HIGH: Unstable     Decision Making/ Complexity Score: low       Goals:  Short Term Goals: 3 weeks   Patient will independently complete Home exercise program with correct form.   Patient will report decreased pain to less than or equal to 3/10 for improved quality of life.   Patient will independently transfer sit to stand with no UE use for increased independence with community transfers.     Long Term Goals: 6 weeks   Pt will increase R knee flexion to 120, knee extension to 0 degrees, and quad lag to 0 degrees  to allow patient normal gait pattern.  Pt will increase R knee strength to 4/5 to allow patient to safely return to prior level of function.  Pt will report decrease in pain to less than or equal to 2/10 to improve quality of life.  Pt will complete 5 x sit<> less than or equal to 15 seconds indicating increased LE power.   Pt will negotiate up and down stairs with a reciprocal pattern for return to stair climbing at work.   Pt will ambulate with gait speed greater than or equal to 3.25 feet per second.     Plan     Plan of care Certification: 5/22/2024 to 6/28/2024.    Outpatient Physical Therapy 3 times weekly for 6 weeks to include the following interventions: Electrical Stimulation unattended, Gait Training, Manual Therapy, Moist Heat/ Ice, Neuromuscular Re-ed, Patient Education, Therapeutic Activities, and Therapeutic Exercise.     Francisco Bell, PT, DPT       Physician's Signature: _________________________________________ Date: ________________

## 2024-05-24 ENCOUNTER — CLINICAL SUPPORT (OUTPATIENT)
Dept: REHABILITATION | Facility: HOSPITAL | Age: 59
End: 2024-05-24
Payer: COMMERCIAL

## 2024-05-24 ENCOUNTER — DOCUMENTATION ONLY (OUTPATIENT)
Dept: REHABILITATION | Facility: HOSPITAL | Age: 59
End: 2024-05-24
Payer: COMMERCIAL

## 2024-05-24 DIAGNOSIS — Z98.890 OTHER SPECIFIED POSTPROCEDURAL STATES: Primary | ICD-10-CM

## 2024-05-24 PROCEDURE — 97110 THERAPEUTIC EXERCISES: CPT | Mod: CQ

## 2024-05-24 NOTE — PROGRESS NOTES
"[facility]   Physical Therapy Treatment Note      Name: Quintin Sandoval  Clinic Number: [id number]    Therapy Diagnosis:        Encounter Diagnoses   Name Primary?    Osteoarthritis of knee, unspecified      Other specified postprocedural states          Physician: Jairo Gilmore MD     Physician Orders: PT Eval and Treat   Medical Diagnosis from Referral: R knee osteoarthritis   Evaluation Date: 5/22/2024  Authorization Period Expiration: 5/21/2025  Plan of Care Expiration: 6/28/2024  Progress Note Due: 6/28/2024  Date of Surgery: 5/20/2024  Visit # / Visits authorized: 2/ 12   FOTO: to be completed on visit 6     Precautions: Standard      Time In: 9:30  Time Out: 10:25  Total Billable Time: 40 minutes  (10 not billed for CP application)     Visit Date: 5/24/2024      PTA Visit #: 1/5       Subjective     Patient reports: Compliance with Home exercise program given at physical therapy initial evaluation.  Patient reports blisters on skin at edges of where bandage/tape was applied over incision.  Supervising physical therapist Francisco Bell DPT contacted Dr. Gilmore's office in attempt to report patient's complaints and clinical findings     He was compliant with home exercise program.  Response to previous treatment: No adverse effects   Functional change: Early in Plan of Care     Pain: 3/10  Location: right knee      Objective      Objective Measures updated at progress report unless specified.     Treatment     Leno received the treatments listed below:      therapeutic exercises to develop strength, endurance, ROM, and flexibility for 40 minutes including      Ther- Ex    Nustep/Bike 8 minutes *    Wedge 1 minute *    Hamstring Stretch 2 x 20" *    Heelslides 20 x 3" ( 69 degrees)    LAQ's 10 x 3"    Hamstring Curls    Quad sets     SAQ's 2 x 10, active assisted   Straight Leg Raises 2 x 10 active assisted    Horizontal hip abduction  2 x 10, active assisted   Heelslides " "supine  20 x (72 degrees)    Hip adduction  20 x 3" *            Ther-Act    Heel Raises    Mini Squats    3 way hip    Sit <> stands     Forward Step ups    Lateral Step ups                             CP application x 8 minutes to Right knee with LE elevated greater than heart level .       Patient Education and Home Exercises       Education provided:   - Plan of Care, Home exercise program, Delayed onset muscle soreness     Written Home Exercises Provided: Patient instructed to cont prior HEP. Exercises were reviewed and Leno was able to demonstrate them prior to the end of the session.  Leno demonstrated good  understanding of the education provided. See Electronic Medical Record under Patient Instructions for exercises provided during therapy sessions    Assessment     Quintin is a 58 y.o. male referred to outpatient Physical Therapy with a medical diagnosis of R TKA. Patient presents with R knee pain, decreased R knee ROM, impaired R lower extremity muscle strength and motor control, and edema. Patient's impairments are limiting his independence and activity tolerance with mobility tasks.  Patient requires increased time and effort to progress through completion of therapeutic exercises due to reports of Right knee pain, decreased strength, and decreased ROM.  LPTA provided patient with visual and verbal cueing throughout session for proper LE alignment, speed, motor control and decreased compensations with exercises.  Patient requires physical assistance to complete Right knee Short Arc Quads', horizontal hip abduction, and supine straight leg raises'.  Noted improved active range of motion Right knee extension to flexion at end of physical therapy treatment session.     Leno Is progressing well towards his goals.   Patient prognosis is Good.     Patient will continue to benefit from skilled outpatient physical therapy to address the deficits listed in the problem list box on initial evaluation, provide " pt/family education and to maximize pt's level of independence in the home and community environment.     Patient's spiritual, cultural and educational needs considered and pt agreeable to plan of care and goals.     Anticipated barriers to physical therapy: N/A    Goals:   Short Term Goals: 3 weeks   Patient will independently complete Home exercise program with correct form.   Patient will report decreased pain to less than or equal to 3/10 for improved quality of life.   Patient will independently transfer sit to stand with no UE use for increased independence with community transfers.      Long Term Goals: 6 weeks   Pt will increase R knee flexion to 120, knee extension to 0 degrees, and quad lag to 0 degrees to allow patient normal gait pattern.  Pt will increase R knee strength to 4/5 to allow patient to safely return to prior level of function.  Pt will report decrease in pain to less than or equal to 2/10 to improve quality of life.  Pt will complete 5 x sit<> less than or equal to 15 seconds indicating increased LE power.   Pt will negotiate up and down stairs with a reciprocal pattern for return to stair climbing at work.   Pt will ambulate with gait speed greater than or equal to 3.25 feet per second.     Plan     Plan of care Certification: 5/22/2024 to 6/28/2024.  Outpatient Physical Therapy 3 times weekly for 6 weeks to include the following interventions: Electrical Stimulation unattended, Gait Training, Manual Therapy, Moist Heat/ Ice, Neuromuscular Re-ed, Patient Education, Therapeutic Activities, and Therapeutic Exercise.      Continue Plan of Care per PT order to progress patient toward rehab goals as tolerated by patient.   Saritha White, PTA  5/24/2024

## 2024-05-24 NOTE — PROGRESS NOTES
Patient called and reported that he spoke to someone through the patient care portal. He was instructed to gently wash the two blisters with warm soapy water and continue monitoring them. He was told to call back to surgeon's office if those blisters worsen or further skin irritation occurs.   Francisco Bell, PT, DPT

## 2024-05-28 ENCOUNTER — CLINICAL SUPPORT (OUTPATIENT)
Dept: REHABILITATION | Facility: HOSPITAL | Age: 59
End: 2024-05-28
Payer: COMMERCIAL

## 2024-05-28 DIAGNOSIS — M17.12 PRIMARY OSTEOARTHRITIS OF LEFT KNEE: Primary | ICD-10-CM

## 2024-05-28 PROCEDURE — 97110 THERAPEUTIC EXERCISES: CPT

## 2024-05-28 NOTE — PROGRESS NOTES
"[facility]   Physical Therapy Treatment Note      Name: Quintin Sandoval  Clinic Number: [id number]    Therapy Diagnosis:        Encounter Diagnoses   Name Primary?    Osteoarthritis of knee, unspecified      Other specified postprocedural states          Physician: Jairo Gilmore MD     Physician Orders: PT Eval and Treat   Medical Diagnosis from Referral: R knee osteoarthritis   Evaluation Date: 5/22/2024  Authorization Period Expiration: 5/21/2025  Plan of Care Expiration: 6/28/2024  Progress Note Due: 6/28/2024  Date of Surgery: 5/20/2024  Visit # / Visits authorized: 3/12   FOTO: to be completed on visit 6     Precautions: Standard      Time In: 15:35  Time Out: 16:24  Total Billable Time: 49 minutes  (8 not billed for CP application)     Visit Date: 5/28/2024      PTA Visit #: 0/5   Subjective     Patient reports: "I've been taking a few steps in the house without my walker." Patient also reports no new skin irritation since last treatment.     He was compliant with home exercise program.  Response to previous treatment: No adverse effects   Functional change: Early in Plan of Care     Pain: 2/10  Location: right knee      Objective      Objective Measures updated at progress report unless specified.     Treatment     Leno received the treatments listed below:      therapeutic exercises to develop strength, endurance, ROM, and flexibility for 40 minutes including      Ther- Ex    Nustep/Bike 8 minutes    Wedge 2 minutes *   Hamstring Stretch 2 x 20" *    Heelslides 20 x 3"    LAQ's 2 x 10 3"    Hamstring Curls    Quad sets  3 x 10 3" (5 degrees from zero)   SAQ's 2 x 10 3"   Straight Leg Raises 2 x 10 active assisted    Sidelying Hip ABD  2 x 10    Heelslides supine  30 x (75 degrees)    Hip adduction  20 x 3"            Ther-Act    Heel Raises    Mini Squats    3 way hip    Sit <> stands     Forward Step ups    Lateral Step ups                             CP application x 8 " minutes to Right knee with LE elevated greater than heart level .       Patient Education and Home Exercises       Education provided:   - Plan of Care, Home exercise program, Delayed onset muscle soreness     Written Home Exercises Provided: Patient instructed to cont prior HEP. Exercises were reviewed and Leno was able to demonstrate them prior to the end of the session.  Leno demonstrated good  understanding of the education provided. See Electronic Medical Record under Patient Instructions for exercises provided during therapy sessions    Assessment     Quintin is a 58 y.o. male referred to outpatient Physical Therapy with a medical diagnosis of R TKA. Patient presents with R knee pain, decreased R knee ROM, impaired R lower extremity muscle strength and motor control, and edema. Patient's impairments are limiting his independence and activity tolerance with mobility tasks.  Patient requires increased time and effort to progress through completion of therapeutic exercises due to reports of Right knee pain, decreased strength, and decreased ROM.  PT positioned patient appropriately on NuStep to increase R knee flexion ROM and decrease joint stiffness prior to exercises. Patient progressed through exercises with verbal and tactile cues to move through increased available ROM, decrease guarding, and decrease compensations with exercises. Patient has increased R knee flexion and extension active range of motion today. No adverse effects noted to treatment tasks.     Leno Is progressing well towards his goals.   Patient prognosis is Good.     Patient will continue to benefit from skilled outpatient physical therapy to address the deficits listed in the problem list box on initial evaluation, provide pt/family education and to maximize pt's level of independence in the home and community environment.     Patient's spiritual, cultural and educational needs considered and pt agreeable to plan of care and goals.      Anticipated barriers to physical therapy: N/A    Goals:   Short Term Goals: 3 weeks   Patient will independently complete Home exercise program with correct form.   Patient will report decreased pain to less than or equal to 3/10 for improved quality of life.   Patient will independently transfer sit to stand with no UE use for increased independence with community transfers.      Long Term Goals: 6 weeks   Pt will increase R knee flexion to 120, knee extension to 0 degrees, and quad lag to 0 degrees to allow patient normal gait pattern.  Pt will increase R knee strength to 4/5 to allow patient to safely return to prior level of function.  Pt will report decrease in pain to less than or equal to 2/10 to improve quality of life.  Pt will complete 5 x sit<> less than or equal to 15 seconds indicating increased LE power.   Pt will negotiate up and down stairs with a reciprocal pattern for return to stair climbing at work.   Pt will ambulate with gait speed greater than or equal to 3.25 feet per second.     Plan   Plan of care Certification: 5/22/2024 to 6/28/2024.  Outpatient Physical Therapy 3 times weekly for 6 weeks to include the following interventions: Electrical Stimulation unattended, Gait Training, Manual Therapy, Moist Heat/ Ice, Neuromuscular Re-ed, Patient Education, Therapeutic Activities, and Therapeutic Exercise.      Continue Plan of Care per PT order to progress patient toward rehab goals as tolerated by patient.   Francisco Bell, PT, DPT     5/28/2024

## 2024-05-29 ENCOUNTER — CLINICAL SUPPORT (OUTPATIENT)
Dept: REHABILITATION | Facility: HOSPITAL | Age: 59
End: 2024-05-29
Payer: COMMERCIAL

## 2024-05-29 DIAGNOSIS — M17.12 PRIMARY OSTEOARTHRITIS OF LEFT KNEE: Primary | ICD-10-CM

## 2024-05-29 PROCEDURE — 97110 THERAPEUTIC EXERCISES: CPT | Mod: CQ

## 2024-05-29 NOTE — PROGRESS NOTES
"[facility]   Physical Therapy Treatment Note      Name: Quintin Sandoval  Clinic Number: [id number]    Therapy Diagnosis:        Encounter Diagnoses   Name Primary?    Osteoarthritis of knee, unspecified      Other specified postprocedural states          Physician: Jairo Gilmore MD     Physician Orders: PT Eval and Treat   Medical Diagnosis from Referral: R knee osteoarthritis   Evaluation Date: 5/22/2024  Authorization Period Expiration: 5/21/2025  Plan of Care Expiration: 6/28/2024  Progress Note Due: 6/28/2024  Date of Surgery: 5/20/2024  Visit # / Visits authorized: 4/12   FOTO: to be completed on visit 6     Precautions: Standard      Time In: 11:00  Time Out: 11:50  Total Billable Time: 40 minutes (10 not billable for CP application)     Visit Date: 5/29/2024      PTA Visit #: 1/5  Subjective     Patient reports: "My knee is sore on the inside and Right beneath it on the outside of my leg".       He was compliant with home exercise program.  Response to previous treatment: No adverse effects   Functional change: Early in Plan of Care     Pain: 2/10  Location: right knee      Objective      Objective Measures updated at progress report unless specified.     Treatment     Leno received the treatments listed below:      therapeutic exercises to develop strength, endurance, ROM, and flexibility for 40 minutes including      Ther- Ex    Nustep/Bike 8 minutes    Wedge 2 minutes *   Hamstring Stretch 2 x 20" *    Heelslides 20 x 3"  (83 degrees)   LAQ's 3 x 10 3"     Hamstring Curls    Quad sets  3 x 10 3" (5 degrees from zero)   SAQ's 2 x 10 3"   Straight Leg Raises 2 x 10 active assisted    Sidelying Hip ABD  2 x 10    Heelslides supine  30 x (85  degrees)    Hip adduction  30 x 3" *           Ther-Act    Heel Raises    Mini Squats    3 way hip    Sit <> stands     Forward Step ups    Lateral Step ups                             CP application x 8 minutes to Right knee with LE " elevated greater than heart level .       Patient Education and Home Exercises       Education provided:   - Plan of Care, Home exercise program, Delayed onset muscle soreness     Written Home Exercises Provided: Patient instructed to cont prior HEP. Exercises were reviewed and Leno was able to demonstrate them prior to the end of the session.  Leno demonstrated good  understanding of the education provided. See Electronic Medical Record under Patient Instructions for exercises provided during therapy sessions    Assessment     Quintin is a 58 y.o. male referred to outpatient Physical Therapy with a medical diagnosis of R TKA. Patient presents with R knee pain, decreased R knee ROM, impaired R lower extremity muscle strength and motor control, and edema. Patient's impairments are limiting his independence and activity tolerance with mobility tasks.  Patient requires increased time and effort to progress through completion of therapeutic exercises due to reports of Right knee pain, decreased strength, and decreased ROM.  LPTA positioned patient appropriately on NuStep to increase R knee flexion ROM and decrease joint stiffness prior to exercises. Patient progressed through exercises with verbal and tactile cues to move through increased available ROM, decrease guarding, and decrease compensations with exercises.  Active range of motion improved at end of PT treatment session.  No adverse effects noted.     Leno Is progressing well towards his goals.   Patient prognosis is Good.     Patient will continue to benefit from skilled outpatient physical therapy to address the deficits listed in the problem list box on initial evaluation, provide pt/family education and to maximize pt's level of independence in the home and community environment.     Patient's spiritual, cultural and educational needs considered and pt agreeable to plan of care and goals.     Anticipated barriers to physical therapy: N/A    Goals:   Short Term  Goals: 3 weeks   Patient will independently complete Home exercise program with correct form.   Patient will report decreased pain to less than or equal to 3/10 for improved quality of life.   Patient will independently transfer sit to stand with no UE use for increased independence with community transfers.      Long Term Goals: 6 weeks   Pt will increase R knee flexion to 120, knee extension to 0 degrees, and quad lag to 0 degrees to allow patient normal gait pattern.  Pt will increase R knee strength to 4/5 to allow patient to safely return to prior level of function.  Pt will report decrease in pain to less than or equal to 2/10 to improve quality of life.  Pt will complete 5 x sit<> less than or equal to 15 seconds indicating increased LE power.   Pt will negotiate up and down stairs with a reciprocal pattern for return to stair climbing at work.   Pt will ambulate with gait speed greater than or equal to 3.25 feet per second.     Plan   Plan of care Certification: 5/22/2024 to 6/28/2024.  Outpatient Physical Therapy 3 times weekly for 6 weeks to include the following interventions: Electrical Stimulation unattended, Gait Training, Manual Therapy, Moist Heat/ Ice, Neuromuscular Re-ed, Patient Education, Therapeutic Activities, and Therapeutic Exercise.      Continue Plan of Care per PT order to progress patient toward rehab goals as tolerated by patient.   Saritha White, PTA,     5/29/2024

## 2024-05-31 ENCOUNTER — CLINICAL SUPPORT (OUTPATIENT)
Dept: REHABILITATION | Facility: HOSPITAL | Age: 59
End: 2024-05-31
Payer: COMMERCIAL

## 2024-05-31 DIAGNOSIS — M17.12 PRIMARY OSTEOARTHRITIS OF LEFT KNEE: Primary | ICD-10-CM

## 2024-05-31 PROCEDURE — 97110 THERAPEUTIC EXERCISES: CPT | Mod: CQ

## 2024-05-31 NOTE — PROGRESS NOTES
"[facility]   Physical Therapy Treatment Note      Name: Quintin Sandoval  Clinic Number: [id number]    Therapy Diagnosis:        Encounter Diagnoses   Name Primary?    Osteoarthritis of knee, unspecified      Other specified postprocedural states          Physician: Jairo Gilmore MD     Physician Orders: PT Eval and Treat   Medical Diagnosis from Referral: R knee osteoarthritis   Evaluation Date: 5/22/2024  Authorization Period Expiration: 5/21/2025  Plan of Care Expiration: 6/28/2024  Progress Note Due: 6/28/2024  Date of Surgery: 5/20/2024  Visit # / Visits authorized: 5/12   FOTO: to be completed on visit 6     Precautions: Standard      Time In: 12:38  Time Out: 12:20  Total Billable Time: 32 minutes (10 not billed for CP)     Visit Date: 5/31/2024      PTA Visit #: 2/5  Subjective     Patient reports:  Right knee is feeling better.  States he has been walking around the house with [place].     He was compliant with home exercise program.  Response to previous treatment: No adverse effects   Functional change: Early in Plan of Care     Pain: 2/10  Location: right knee      Objective      Objective Measures updated at progress report unless specified.     Treatment     Leno received the treatments listed below:      therapeutic exercises to develop strength, endurance, ROM, and flexibility for 32 minutes including      Ther- Ex    Nustep/Bike 8 minutes    Wedge 2 minutes    Hamstring Stretch 2 x 20"    Heelslides seated  30 x 3"  (93 degrees) *   LAQ's 3 x 10 3"     Hamstring Curls    Quad sets  3 x 10 3" (3 degrees from zero)   SAQ's 2 x 10 3"   Straight Leg Raises 2 x 10 active assisted    Sidelying Hip ABD  2 x 10    Heelslides supine  30 x (95  degrees)    Hip adduction  30 x 3" *           Ther-Act    Heel Raises    Mini Squats    3 way hip    Sit <> stands     Forward Step ups    Lateral Step ups                             CP application x 8 minutes to Right knee with LE " elevated greater than heart level .       Patient Education and Home Exercises       Education provided:   - Plan of Care, Home exercise program, Delayed onset muscle soreness     Written Home Exercises Provided: Patient instructed to cont prior HEP. Exercises were reviewed and Leno was able to demonstrate them prior to the end of the session.  Leno demonstrated good  understanding of the education provided. See Electronic Medical Record under Patient Instructions for exercises provided during therapy sessions    Assessment     Quintin is a 58 y.o. male referred to outpatient Physical Therapy with a medical diagnosis of R TKA. Patient presents with R knee pain, decreased R knee ROM, impaired R lower extremity muscle strength and motor control, and edema. Patient's impairments are limiting his independence and activity tolerance with mobility tasks.  Patient requires increased time and effort to progress through completion of therapeutic exercises due to reports of Right knee pain, decreased strength, and decreased ROM.  LPTA positioned patient appropriately on NuStep to increase R knee flexion ROM and decrease joint stiffness prior to exercises.  Patient presents with less muscle guarding during completion of Therapeutic exercises.  Noted increased Right knee flexion seated and supine.  No adverse effects noted or reported.         Leno Is progressing well towards his goals.   Patient prognosis is Good.     Patient will continue to benefit from skilled outpatient physical therapy to address the deficits listed in the problem list box on initial evaluation, provide pt/family education and to maximize pt's level of independence in the home and community environment.     Patient's spiritual, cultural and educational needs considered and pt agreeable to plan of care and goals.     Anticipated barriers to physical therapy: N/A    Goals:   Short Term Goals: 3 weeks   Patient will independently complete Home exercise program  with correct form.   Patient will report decreased pain to less than or equal to 3/10 for improved quality of life.   Patient will independently transfer sit to stand with no UE use for increased independence with community transfers.      Long Term Goals: 6 weeks   Pt will increase R knee flexion to 120, knee extension to 0 degrees, and quad lag to 0 degrees to allow patient normal gait pattern.  Pt will increase R knee strength to 4/5 to allow patient to safely return to prior level of function.  Pt will report decrease in pain to less than or equal to 2/10 to improve quality of life.  Pt will complete 5 x sit<> less than or equal to 15 seconds indicating increased LE power.   Pt will negotiate up and down stairs with a reciprocal pattern for return to stair climbing at work.   Pt will ambulate with gait speed greater than or equal to 3.25 feet per second.     Plan   Plan of care Certification: 5/22/2024 to 6/28/2024.  Outpatient Physical Therapy 3 times weekly for 6 weeks to include the following interventions: Electrical Stimulation unattended, Gait Training, Manual Therapy, Moist Heat/ Ice, Neuromuscular Re-ed, Patient Education, Therapeutic Activities, and Therapeutic Exercise.      Continue Plan of Care per PT order to progress patient toward rehab goals as tolerated by patient.     Saritha hWite, PTA,     5/31/2024

## 2024-06-03 ENCOUNTER — CLINICAL SUPPORT (OUTPATIENT)
Dept: REHABILITATION | Facility: HOSPITAL | Age: 59
End: 2024-06-03
Payer: COMMERCIAL

## 2024-06-03 DIAGNOSIS — M17.12 PRIMARY OSTEOARTHRITIS OF LEFT KNEE: Primary | ICD-10-CM

## 2024-06-03 PROCEDURE — 97110 THERAPEUTIC EXERCISES: CPT

## 2024-06-03 NOTE — PROGRESS NOTES
"[facility]   Physical Therapy Treatment Note      Name: Quintin Sandoval  Clinic Number: [id number]    Therapy Diagnosis:        Encounter Diagnoses   Name Primary?    Osteoarthritis of knee, unspecified      Other specified postprocedural states          Physician: Jairo Gilmore MD     Physician Orders: PT Eval and Treat   Medical Diagnosis from Referral: R knee osteoarthritis   Evaluation Date: 5/22/2024  Authorization Period Expiration: 5/21/2025  Plan of Care Expiration: 6/28/2024  Progress Note Due: 6/28/2024  Date of Surgery: 5/20/2024  Visit # / Visits authorized: 6/12   FOTO: to be completed on visit 6     Precautions: Standard      Time In: 10:53 (later check in time)  Time Out: 11:45  Total Billable Time: 53 minutes (8 not billed for CP)     Visit Date: 6/3/2024      PTA Visit #: 0/5  Subjective     Patient reports: "I have my follow-up with the surgeon Thursday."     He was compliant with home exercise program.  Response to previous treatment: No adverse effects   Functional change: progression to straight cane for short distance ambulation    Pain: 2/10  Location: right knee      Objective      Objective Measures updated at progress report unless specified.     Treatment     Leno received the treatments listed below:      therapeutic exercises to develop strength, endurance, ROM, and flexibility for 32 minutes including      Ther- Ex Reps   Nustep/Bike 8 minutes    Wedge 2 minutes    Hamstring Stretch 3 x 20"    Heelslides seated  30 x 3"  (92 degrees)    LAQ's 3 x 10 3"     Hamstring Curls 2 x 10 yellow TB   Quad sets  3 x 10 3" (3 degrees from zero)   SAQ's 2 x 10 3"   Straight Leg Raises 2 x 10 active assisted    Sidelying Hip ABD  2 x 10    Heelslides supine  30 x (93 degrees)    Hip adduction  30 x 3" *           Ther-Act    Heel Raises    Mini Squats    3 way hip    Sit <> stands     Forward Step ups    Lateral Step ups                             CP application x " 8 minutes to Right knee with LE elevated greater than heart level .   Patient Education and Home Exercises       Education provided:   - Plan of Care, Home exercise program, Delayed onset muscle soreness     Written Home Exercises Provided: Patient instructed to cont prior HEP. Exercises were reviewed and Leno was able to demonstrate them prior to the end of the session.  Leno demonstrated good  understanding of the education provided. See Electronic Medical Record under Patient Instructions for exercises provided during therapy sessions    Assessment     Quintin is a 58 y.o. male referred to outpatient Physical Therapy with a medical diagnosis of R TKA. Patient presents with R knee pain, decreased R knee ROM, impaired R lower extremity muscle strength and motor control, and edema. Patient's impairments are limiting his independence and activity tolerance with mobility tasks.  Patient requires increased time and effort to progress through completion of therapeutic exercises due to reports of Right knee pain, decreased strength, and decreased ROM.  PT positioned patient appropriately on NuStep to increase R knee flexion ROM and decrease joint stiffness prior to exercises.  Patient demonstrates good carryover of exercise instruction requiring only minimal cueing to decrease compensations. Patient had no reports of increased pain or adverse effects to treatment tasks.     Leno Is progressing well towards his goals.   Patient prognosis is Good.     Patient will continue to benefit from skilled outpatient physical therapy to address the deficits listed in the problem list box on initial evaluation, provide pt/family education and to maximize pt's level of independence in the home and community environment.     Patient's spiritual, cultural and educational needs considered and pt agreeable to plan of care and goals.     Anticipated barriers to physical therapy: N/A    Goals:   Short Term Goals: 3 weeks   Patient will  independently complete Home exercise program with correct form.   Patient will report decreased pain to less than or equal to 3/10 for improved quality of life.   Patient will independently transfer sit to stand with no UE use for increased independence with community transfers.      Long Term Goals: 6 weeks   Pt will increase R knee flexion to 120, knee extension to 0 degrees, and quad lag to 0 degrees to allow patient normal gait pattern.  Pt will increase R knee strength to 4/5 to allow patient to safely return to prior level of function.  Pt will report decrease in pain to less than or equal to 2/10 to improve quality of life.  Pt will complete 5 x sit<> less than or equal to 15 seconds indicating increased LE power.   Pt will negotiate up and down stairs with a reciprocal pattern for return to stair climbing at work.   Pt will ambulate with gait speed greater than or equal to 3.25 feet per second.     Plan   Plan of care Certification: 5/22/2024 to 6/28/2024.  Outpatient Physical Therapy 3 times weekly for 6 weeks to include the following interventions: Electrical Stimulation unattended, Gait Training, Manual Therapy, Moist Heat/ Ice, Neuromuscular Re-ed, Patient Education, Therapeutic Activities, and Therapeutic Exercise.      Continue Plan of Care per PT order to progress patient toward rehab goals as tolerated by patient.     Francisco Bell, PT, DPT     6/3/2024

## 2024-06-05 ENCOUNTER — CLINICAL SUPPORT (OUTPATIENT)
Dept: REHABILITATION | Facility: HOSPITAL | Age: 59
End: 2024-06-05
Payer: COMMERCIAL

## 2024-06-05 DIAGNOSIS — M17.12 PRIMARY OSTEOARTHRITIS OF LEFT KNEE: Primary | ICD-10-CM

## 2024-06-05 PROCEDURE — 97110 THERAPEUTIC EXERCISES: CPT | Mod: CQ

## 2024-06-05 PROCEDURE — 97010 HOT OR COLD PACKS THERAPY: CPT | Mod: CQ

## 2024-06-05 NOTE — PROGRESS NOTES
"[facility]   Physical Therapy Treatment Note      Name: Quintin Sandoval  Clinic Number: [id number]    Therapy Diagnosis:        Encounter Diagnoses   Name Primary?    Osteoarthritis of knee, unspecified      Other specified postprocedural states          Physician: Jairo Gilmore MD     Physician Orders: PT Eval and Treat   Medical Diagnosis from Referral: R knee osteoarthritis   Evaluation Date: 5/22/2024  Authorization Period Expiration: 5/21/2025  Plan of Care Expiration: 6/28/2024  Progress Note Due: 6/28/2024  Date of Surgery: 5/20/2024  Visit # / Visits authorized: 6/12   FOTO: to be completed on visit 6     Precautions: Standard      Time In: 10:23 (later check in time)  Time Out: 11:10  Total Billable Time: 47 minutes (8 not billed for CP)     Visit Date: 6/5/2024      PTA Visit #: 1/5  Subjective     Patient reports: "I'm good today."     He was compliant with home exercise program.  Response to previous treatment: No adverse effects   Functional change: progression to straight cane for short distance ambulation    Pain: 2/10  Location: right knee      Objective      Objective Measures updated at progress report unless specified.     Treatment     Leno received the treatments listed below:      therapeutic exercises to develop strength, endurance, ROM, and flexibility for 39 minutes including      Ther- Ex Reps   Nustep/Bike 8 minutes    Wedge 2 minutes    Hamstring Stretch 3 x 20"    Heelslides seated  30 x 3"  (92 degrees)    LAQ's 3 x 10 3"     Hamstring Curls 2 x 10 yellow TB   Quad sets  3 x 10 3" (3 degrees from zero)   SAQ's 2 x 10 3"   Straight Leg Raises 2 x 10 active assisted    Sidelying Hip ABD  2 x 10    Heelslides supine  30 x (100 degrees)    Hip adduction  30 x 3"            Ther-Act    Heel Raises    Mini Squats    3 way hip    Sit <> stands     Forward Step ups    Lateral Step ups                             CP application x 8 minutes to Right knee with LE " elevated greater than heart level .   Patient Education and Home Exercises       Education provided:   - Plan of Care, Home exercise program, Delayed onset muscle soreness     Written Home Exercises Provided: Patient instructed to cont prior HEP. Exercises were reviewed and Leno was able to demonstrate them prior to the end of the session.  Leno demonstrated good  understanding of the education provided. See Electronic Medical Record under Patient Instructions for exercises provided during therapy sessions    Assessment     Quintin is a 58 y.o. male referred to outpatient Physical Therapy with a medical diagnosis of R TKA. Patient presents with R knee pain, decreased R knee ROM, impaired R lower extremity muscle strength and motor control, and edema. Patient's impairments are limiting his independence and activity tolerance with mobility tasks.  Patient requires increased time and effort to progress through completion of therapeutic exercises due to reports of Right knee pain, decreased strength, and decreased ROM.  LPTA positioned pt appropriately on NuStep to increase R knee flexion ROM and decrease joint stiffness prior to exercises.  Pt maintains good carryover of exercise instruction requiring only minimal cueing to decrease compensations. Pt displays improvement in active right knee flexion range of motion. No adverse effects noted.    Leno Is progressing well towards his goals.   Patient prognosis is Good.     Patient will continue to benefit from skilled outpatient physical therapy to address the deficits listed in the problem list box on initial evaluation, provide pt/family education and to maximize pt's level of independence in the home and community environment.     Patient's spiritual, cultural and educational needs considered and pt agreeable to plan of care and goals.     Anticipated barriers to physical therapy: N/A    Goals:   Short Term Goals: 3 weeks   Patient will independently complete Home exercise  program with correct form.   Patient will report decreased pain to less than or equal to 3/10 for improved quality of life.   Patient will independently transfer sit to stand with no UE use for increased independence with community transfers.      Long Term Goals: 6 weeks   Pt will increase R knee flexion to 120, knee extension to 0 degrees, and quad lag to 0 degrees to allow patient normal gait pattern.  Pt will increase R knee strength to 4/5 to allow patient to safely return to prior level of function.  Pt will report decrease in pain to less than or equal to 2/10 to improve quality of life.  Pt will complete 5 x sit<> less than or equal to 15 seconds indicating increased LE power.   Pt will negotiate up and down stairs with a reciprocal pattern for return to stair climbing at work.   Pt will ambulate with gait speed greater than or equal to 3.25 feet per second.     Plan   Plan of care Certification: 5/22/2024 to 6/28/2024.  Outpatient Physical Therapy 3 times weekly for 6 weeks to include the following interventions: Electrical Stimulation unattended, Gait Training, Manual Therapy, Moist Heat/ Ice, Neuromuscular Re-ed, Patient Education, Therapeutic Activities, and Therapeutic Exercise.      Continue Plan of Care per PT order to progress patient toward rehab goals as tolerated by patient.   STAN Alvarado  6/5/2024

## 2024-06-07 ENCOUNTER — CLINICAL SUPPORT (OUTPATIENT)
Dept: REHABILITATION | Facility: HOSPITAL | Age: 59
End: 2024-06-07
Payer: COMMERCIAL

## 2024-06-07 DIAGNOSIS — M17.12 PRIMARY OSTEOARTHRITIS OF LEFT KNEE: Primary | ICD-10-CM

## 2024-06-07 PROCEDURE — 97010 HOT OR COLD PACKS THERAPY: CPT | Mod: CQ

## 2024-06-07 PROCEDURE — 97110 THERAPEUTIC EXERCISES: CPT | Mod: CQ

## 2024-06-07 NOTE — PROGRESS NOTES
"[facility]   Physical Therapy Treatment Note      Name: Quintin Sandoval  Clinic Number: [id number]    Therapy Diagnosis:        Encounter Diagnoses   Name Primary?    Osteoarthritis of knee, unspecified      Other specified postprocedural states          Physician: Jairo Gilmore MD     Physician Orders: PT Eval and Treat   Medical Diagnosis from Referral: R knee osteoarthritis   Evaluation Date: 5/22/2024  Authorization Period Expiration: 5/21/2025  Plan of Care Expiration: 6/28/2024  Progress Note Due: 6/28/2024  Date of Surgery: 5/20/2024  Visit # / Visits authorized: 6/12   FOTO: to be completed on visit 6     Precautions: Standard      Time In: 9:35   Time Out: 10:30  Total Billable Time: 55 minutes (8 not billed for CP)     Visit Date: 6/7/2024      PTA Visit #: 1/5  Subjective     Patient reports: "I'm good today."     He was compliant with home exercise program.  Response to previous treatment: No adverse effects   Functional change: progression to straight cane for short distance ambulation    Pain: 2/10  Location: right knee      Objective      Objective Measures updated at progress report unless specified.     Treatment     Leno received the treatments listed below:      therapeutic exercises to develop strength, endurance, ROM, and flexibility for 47 minutes including      Ther- Ex Reps   Nustep/Bike 8 minutes    Wedge 2 minutes    Hamstring Stretch 3 x 20"    Heelslides seated  30 x 3"  (92 degrees)    LAQ's 3 x 10 3"     Hamstring Curls 2 x 10 yellow TB   Quad sets  3 x 10 3" (3 degrees from zero)   SAQ's 2 x 10 3"   Straight Leg Raises 2 x 10 active assisted    Sidelying Hip ABD  2 x 10    Heelslides supine  30 x (100 degrees)    Hip adduction  30 x 3"            Ther-Act    Heel Raises    Mini Squats    3 way hip    Sit <> stands     Forward Step ups    Lateral Step ups                             CP application x 8 minutes to Right knee with LE elevated greater than " heart level .   Patient Education and Home Exercises       Education provided:   - Plan of Care, Home exercise program, Delayed onset muscle soreness     Written Home Exercises Provided: Patient instructed to cont prior HEP. Exercises were reviewed and Leno was able to demonstrate them prior to the end of the session.  Leno demonstrated good  understanding of the education provided. See Electronic Medical Record under Patient Instructions for exercises provided during therapy sessions    Assessment     Quintin is a 58 y.o. male referred to outpatient Physical Therapy with a medical diagnosis of R TKA. Patient presents with R knee pain, decreased R knee ROM, impaired R lower extremity muscle strength and motor control, and edema. Patient's impairments are limiting his independence and activity tolerance with mobility tasks.  Pt requires increased time and effort to progress through completion of therapeutic exercises due to reports of Right knee pain, decreased strength, and decreased ROM.  LPTA provided setup on NuStep to increase R knee flexion ROM and decrease joint stiffness prior to exercises.  Pt maintains good carryover of exercise instruction requiring only minimal cueing to decrease compensations. Pt maintains previous knee flexion range of motion with less difficulty noted. No adverse effects to tx.    Leno Is progressing well towards his goals.   Patient prognosis is Good.     Patient will continue to benefit from skilled outpatient physical therapy to address the deficits listed in the problem list box on initial evaluation, provide pt/family education and to maximize pt's level of independence in the home and community environment.     Patient's spiritual, cultural and educational needs considered and pt agreeable to plan of care and goals.     Anticipated barriers to physical therapy: N/A    Goals:   Short Term Goals: 3 weeks   Patient will independently complete Home exercise program with correct form.    Patient will report decreased pain to less than or equal to 3/10 for improved quality of life.   Patient will independently transfer sit to stand with no UE use for increased independence with community transfers.      Long Term Goals: 6 weeks   Pt will increase R knee flexion to 120, knee extension to 0 degrees, and quad lag to 0 degrees to allow patient normal gait pattern.  Pt will increase R knee strength to 4/5 to allow patient to safely return to prior level of function.  Pt will report decrease in pain to less than or equal to 2/10 to improve quality of life.  Pt will complete 5 x sit<> less than or equal to 15 seconds indicating increased LE power.   Pt will negotiate up and down stairs with a reciprocal pattern for return to stair climbing at work.   Pt will ambulate with gait speed greater than or equal to 3.25 feet per second.     Plan   Plan of care Certification: 5/22/2024 to 6/28/2024.  Outpatient Physical Therapy 3 times weekly for 6 weeks to include the following interventions: Electrical Stimulation unattended, Gait Training, Manual Therapy, Moist Heat/ Ice, Neuromuscular Re-ed, Patient Education, Therapeutic Activities, and Therapeutic Exercise.      Continue Plan of Care per PT order to progress patient toward rehab goals as tolerated by patient.   STAN Alvarado  6/7/2024

## 2024-06-10 ENCOUNTER — CLINICAL SUPPORT (OUTPATIENT)
Dept: REHABILITATION | Facility: HOSPITAL | Age: 59
End: 2024-06-10
Payer: COMMERCIAL

## 2024-06-10 DIAGNOSIS — M17.12 PRIMARY OSTEOARTHRITIS OF LEFT KNEE: Primary | ICD-10-CM

## 2024-06-10 PROCEDURE — 97110 THERAPEUTIC EXERCISES: CPT | Mod: CQ

## 2024-06-10 PROCEDURE — 97140 MANUAL THERAPY 1/> REGIONS: CPT | Mod: CQ

## 2024-06-10 NOTE — PROGRESS NOTES
"[facility]   Physical Therapy Treatment Note      Name: Quintin Sandoval  Clinic Number: [id number]    Therapy Diagnosis:        Encounter Diagnoses   Name Primary?    Osteoarthritis of knee, unspecified      Other specified postprocedural states          Physician: Jairo Gilmore MD     Physician Orders: PT Eval and Treat   Medical Diagnosis from Referral: R knee osteoarthritis   Evaluation Date: 5/22/2024  Authorization Period Expiration: 5/21/2025  Plan of Care Expiration: 6/28/2024  Progress Note Due: 6/28/2024  Date of Surgery: 5/20/2024  Visit # / Visits authorized: 7/12   FOTO: to be completed on visit 6     Precautions: Standard      Time In: 10:20  Time Out: 11:10  Total Billable Time: 40 minutes     Visit Date: 6/10/2024      PTA Visit #: 2/5  Subjective     Patient reports: "I feel like my IT Band is tightening up on me".     He was compliant with home exercise program.  Response to previous treatment: No adverse effects   Functional change: progression to straight cane for short distance ambulation    Pain: 2/10  Location: right knee      Objective      Objective Measures updated at progress report unless specified.     Treatment     Leno received the treatments listed below:      therapeutic exercises to develop strength, endurance, ROM, and flexibility for 47 minutes including      Ther- Ex Reps   Nustep/Bike 8 minutes    Wedge 2 minutes    Hamstring Stretch 3 x 20"    Heelslides seated  30 x 3"  (106 degrees)    LAQ's 3 x 10 3"     Hamstring Curls 2 x 10 yellow TB   Quad sets  3 x 10 3" (zero degrees )   SAQ's 2 x 10 3"   Straight Leg Raises 2 x 10    Sidelying Hip ABD  2 x 10    Heelslides supine  30 x (107 degrees) *   Hip adduction  30 x 3"            Ther-Act    Heel Raises    Mini Squats    3 way hip    Sit <> stands     Forward Step ups    Lateral Step ups                             STM/MFR to decrease MYF adhesions in Right IT band prior to beginning seated and " supine Therapeutic exercises.     CP application x 8 minutes to Right knee with LE elevated greater than heart level .   Patient Education and Home Exercises       Education provided:   - Plan of Care, Home exercise program, Delayed onset muscle soreness     Written Home Exercises Provided: Patient instructed to cont prior HEP. Exercises were reviewed and Leno was able to demonstrate them prior to the end of the session.  Leno demonstrated good  understanding of the education provided. See Electronic Medical Record under Patient Instructions for exercises provided during therapy sessions    Assessment     Quintin is a 58 y.o. male referred to outpatient Physical Therapy with a medical diagnosis of R TKA. Patient presents with R knee pain, decreased R knee ROM, impaired R lower extremity muscle strength and motor control, and edema. Patient's impairments are limiting his independence and activity tolerance with mobility tasks.  Pt requires increased time and effort to progress through completion of therapeutic exercises due to reports of Right knee pain, decreased strength, and decreased ROM.  LPTA provided setup on NuStep to increase R knee flexion ROM and decrease joint stiffness prior to exercises.  Noted improved Right knee extension and Right knee flexion as compared to previous PT treatment.  Patient reports less tightness and discomfort in Right knee at end of physical therapy treatment session.  No adverse effects noted or reported.       Leno Is progressing well towards his goals.   Patient prognosis is Good.     Patient will continue to benefit from skilled outpatient physical therapy to address the deficits listed in the problem list box on initial evaluation, provide pt/family education and to maximize pt's level of independence in the home and community environment.     Patient's spiritual, cultural and educational needs considered and pt agreeable to plan of care and goals.     Anticipated barriers to  physical therapy: N/A    Goals:   Short Term Goals: 3 weeks   Patient will independently complete Home exercise program with correct form.   Patient will report decreased pain to less than or equal to 3/10 for improved quality of life.   Patient will independently transfer sit to stand with no UE use for increased independence with community transfers.      Long Term Goals: 6 weeks   Pt will increase R knee flexion to 120, knee extension to 0 degrees, and quad lag to 0 degrees to allow patient normal gait pattern.  Pt will increase R knee strength to 4/5 to allow patient to safely return to prior level of function.  Pt will report decrease in pain to less than or equal to 2/10 to improve quality of life.  Pt will complete 5 x sit<> less than or equal to 15 seconds indicating increased LE power.   Pt will negotiate up and down stairs with a reciprocal pattern for return to stair climbing at work.   Pt will ambulate with gait speed greater than or equal to 3.25 feet per second.     Plan   Plan of care Certification: 5/22/2024 to 6/28/2024.  Outpatient Physical Therapy 3 times weekly for 6 weeks to include the following interventions: Electrical Stimulation unattended, Gait Training, Manual Therapy, Moist Heat/ Ice, Neuromuscular Re-ed, Patient Education, Therapeutic Activities, and Therapeutic Exercise.      Continue Plan of Care per PT order to progress patient toward rehab goals as tolerated by patient.   STAN Reyna   6/10/2024

## 2024-06-12 ENCOUNTER — CLINICAL SUPPORT (OUTPATIENT)
Dept: REHABILITATION | Facility: HOSPITAL | Age: 59
End: 2024-06-12
Payer: COMMERCIAL

## 2024-06-12 DIAGNOSIS — M17.12 PRIMARY OSTEOARTHRITIS OF LEFT KNEE: Primary | ICD-10-CM

## 2024-06-12 PROCEDURE — 97110 THERAPEUTIC EXERCISES: CPT | Mod: CQ

## 2024-06-12 PROCEDURE — 97010 HOT OR COLD PACKS THERAPY: CPT | Mod: CQ

## 2024-06-12 NOTE — PROGRESS NOTES
"[facility]   Physical Therapy Treatment Note      Name: Quintin Sandoval  Clinic Number: [id number]    Therapy Diagnosis:        Encounter Diagnoses   Name Primary?    Osteoarthritis of knee, unspecified      Other specified postprocedural states          Physician: Jairo Gilmore MD     Physician Orders: PT Eval and Treat   Medical Diagnosis from Referral: R knee osteoarthritis   Evaluation Date: 5/22/2024  Authorization Period Expiration: 5/21/2025  Plan of Care Expiration: 6/28/2024  Progress Note Due: 6/28/2024  Date of Surgery: 5/20/2024  Visit # / Visits authorized: 10/12   FOTO: to be completed on visit 6     Precautions: Standard      Time In: 9:30  Time Out: 10:23  Total Billable Time: 53 minutes (8 minutes not billable)    Visit Date: 6/12/2024      PTA Visit #: 3/5  Subjective     Patient reports: "That pulling I feel turns out to be nerve pain".     He was compliant with home exercise program.  Response to previous treatment: No adverse effects   Functional change: progression to straight cane for short distance ambulation    Pain: 2/10  Location: right knee      Objective      Objective Measures updated at progress report unless specified.     Treatment     Leno received the treatments listed below:      therapeutic exercises to develop strength, endurance, ROM, and flexibility for 45 minutes including      Ther- Ex Reps   Nustep/Bike 8 minutes    Wedge 2 minutes    Hamstring Stretch 3 x 20"    Heelslides seated  30 x 3"  (106 degrees)    LAQ's 3 x 10 3"     Hamstring Curls 2 x 10 yellow TB   Quad sets  3 x 10 3" (zero degrees )   SAQ's 2 x 10 3"   Straight Leg Raises 2 x 10    Sidelying Hip ABD  2 x 10    Heelslides supine  30 x (107 degrees)    Hip adduction  30 x 3"            Ther-Act    Heel Raises    Mini Squats    3 way hip    Sit <> stands     Forward Step ups    Lateral Step ups                             NOT COMPLETED---STM/MFR to decrease MYF adhesions in " Right IT band prior to beginning seated and supine Therapeutic exercises.     CP application x 8 minutes to Right knee with LE elevated greater than heart level .   Patient Education and Home Exercises       Education provided:   - Plan of Care, Home exercise program, Delayed onset muscle soreness     Written Home Exercises Provided: Patient instructed to cont prior HEP. Exercises were reviewed and Leno was able to demonstrate them prior to the end of the session.  Leno demonstrated good  understanding of the education provided. See Electronic Medical Record under Patient Instructions for exercises provided during therapy sessions    Assessment     Qunitin is a 58 y.o. male referred to outpatient Physical Therapy with a medical diagnosis of R TKA. Patient presents with R knee pain, decreased R knee ROM, impaired R lower extremity muscle strength and motor control, and edema. Patient's impairments are limiting his independence and activity tolerance with mobility tasks.  Pt requires increased time and effort to progress through completion of therapeutic exercises due to reports of Right knee pain, decreased strength, and decreased ROM.  LPTA provided setup on NuStep to increase R knee flexion ROM and decrease joint stiffness prior to exercises. Pt maintains knee extension range of motion and display minimal decrease in knee flexion range of motion.  Pt no longer reports tightness with exercises.  No adverse effects noted or reported.       Leno Is progressing well towards his goals.   Patient prognosis is Good.     Patient will continue to benefit from skilled outpatient physical therapy to address the deficits listed in the problem list box on initial evaluation, provide pt/family education and to maximize pt's level of independence in the home and community environment.     Patient's spiritual, cultural and educational needs considered and pt agreeable to plan of care and goals.     Anticipated barriers to physical  therapy: N/A    Goals:   Short Term Goals: 3 weeks   Patient will independently complete Home exercise program with correct form.   Patient will report decreased pain to less than or equal to 3/10 for improved quality of life.   Patient will independently transfer sit to stand with no UE use for increased independence with community transfers.      Long Term Goals: 6 weeks   Pt will increase R knee flexion to 120, knee extension to 0 degrees, and quad lag to 0 degrees to allow patient normal gait pattern.  Pt will increase R knee strength to 4/5 to allow patient to safely return to prior level of function.  Pt will report decrease in pain to less than or equal to 2/10 to improve quality of life.  Pt will complete 5 x sit<> less than or equal to 15 seconds indicating increased LE power.   Pt will negotiate up and down stairs with a reciprocal pattern for return to stair climbing at work.   Pt will ambulate with gait speed greater than or equal to 3.25 feet per second.     Plan   Plan of care Certification: 5/22/2024 to 6/28/2024.  Outpatient Physical Therapy 3 times weekly for 6 weeks to include the following interventions: Electrical Stimulation unattended, Gait Training, Manual Therapy, Moist Heat/ Ice, Neuromuscular Re-ed, Patient Education, Therapeutic Activities, and Therapeutic Exercise.      Continue Plan of Care per PT order to progress patient toward rehab goals as tolerated by patient.   STAN Alvarado   6/12/2024

## 2024-06-14 ENCOUNTER — CLINICAL SUPPORT (OUTPATIENT)
Dept: REHABILITATION | Facility: HOSPITAL | Age: 59
End: 2024-06-14
Payer: COMMERCIAL

## 2024-06-14 DIAGNOSIS — M17.9 OSTEOARTHRITIS OF KNEE, UNSPECIFIED: Primary | ICD-10-CM

## 2024-06-14 PROCEDURE — 97530 THERAPEUTIC ACTIVITIES: CPT

## 2024-06-14 PROCEDURE — 97110 THERAPEUTIC EXERCISES: CPT

## 2024-06-14 NOTE — PROGRESS NOTES
"[facility]   Physical Therapy Treatment Note      Name: Quintin Sandoval  Clinic Number: [id number]    Therapy Diagnosis:        Encounter Diagnoses   Name Primary?    Osteoarthritis of knee, unspecified      Other specified postprocedural states          Physician: Jairo Gilmore MD     Physician Orders: PT Eval and Treat   Medical Diagnosis from Referral: R knee osteoarthritis   Evaluation Date: 5/22/2024  Authorization Period Expiration: 5/21/2025  Plan of Care Expiration: 6/28/2024  Progress Note Due: 6/28/2024  Date of Surgery: 5/20/2024  Visit # / Visits authorized: 11/12   FOTO: to be completed on visit 6     Precautions: Standard      Time In: 8:45  Time Out: 9:41  Total Billable Time: 56 minutes     Visit Date: 6/14/2024      PTA Visit #: 0/5  Subjective     Patient reports: "It is hard to get going after I have been sitting too long."     He was compliant with home exercise program.  Response to previous treatment: No adverse effects   Functional change: progression to straight cane for short distance ambulation    Pain: 2/10  Location: right knee      Objective      Objective Measures updated at progress report unless specified.     Treatment     Leno received the treatments listed below:      Therapeutic exercises to develop strength, endurance, ROM, and flexibility for 44 minutes including: see flowsheet below   Therapeutic activities to improve functional performance for 10 minutes including: see flowsheet below     Ther- Ex Reps   Nustep/Bike 8 minutes    Wedge 2 minutes    Hamstring Stretch 3 x 20"    Heelslides seated  30 x 3"     LAQ's 2 x 10 1.5#*   Hamstring Curls 3 x 10 red TB *   SAQ's 2 x 10 3"   Straight Leg Raises 2 x 10    Sidelying Hip ABD  2 x 10    Heelslides supine  30 x (105 degrees)    Hip adduction  30 x 3"            Ther-Act    Heel Raises 2 x 10 *   Mini Squats 2 x 10 *   3 way hip    Sit <> stands  10 x *   Forward Step ups    Lateral Step ups  "   Standing R hip and knee flexion   2 x 10 *                      Patient Education and Home Exercises       Education provided:   - Plan of Care, Home exercise program, Delayed onset muscle soreness     Written Home Exercises Provided: Patient instructed to cont prior HEP. Exercises were reviewed and Leno was able to demonstrate them prior to the end of the session.  Leno demonstrated good  understanding of the education provided. See Electronic Medical Record under Patient Instructions for exercises provided during therapy sessions    Assessment     Quintin is a 58 y.o. male referred to outpatient Physical Therapy with a medical diagnosis of R TKA. Patient presents with R knee pain, decreased R knee ROM, impaired R lower extremity muscle strength and motor control, and edema. Patient's impairments are limiting his independence and activity tolerance with mobility tasks.  Pt requires increased time and effort to progress through completion of therapeutic exercises due to reports of Right knee pain, decreased strength, and decreased ROM. PT progressed patient to warm-up on rec bike to increase R knee flexion active range of motion. PT provided verbal cues to decrease hip hiking compensation during time on rec bike. PT initiated standing tasks to increase functional strength and stability of R knee. Patient required visual and verbal cueing for proper LE alignment, form, and speed of movement to increase effectiveness of therapy tasks. Patient had no adverse effects to treatment tasks and declined cold pack at end of treatment session.       Leno Is progressing well towards his goals.   Patient prognosis is Good.     Patient will continue to benefit from skilled outpatient physical therapy to address the deficits listed in the problem list box on initial evaluation, provide pt/family education and to maximize pt's level of independence in the home and community environment.     Patient's spiritual, cultural and  educational needs considered and pt agreeable to plan of care and goals.     Anticipated barriers to physical therapy: N/A    Goals:   Short Term Goals: 3 weeks   Patient will independently complete Home exercise program with correct form.   Patient will report decreased pain to less than or equal to 3/10 for improved quality of life.   Patient will independently transfer sit to stand with no UE use for increased independence with community transfers.      Long Term Goals: 6 weeks   Pt will increase R knee flexion to 120, knee extension to 0 degrees, and quad lag to 0 degrees to allow patient normal gait pattern.  Pt will increase R knee strength to 4/5 to allow patient to safely return to prior level of function.  Pt will report decrease in pain to less than or equal to 2/10 to improve quality of life.  Pt will complete 5 x sit<> less than or equal to 15 seconds indicating increased LE power.   Pt will negotiate up and down stairs with a reciprocal pattern for return to stair climbing at work.   Pt will ambulate with gait speed greater than or equal to 3.25 feet per second.     Plan   Plan of care Certification: 5/22/2024 to 6/28/2024.  Outpatient Physical Therapy 3 times weekly for 6 weeks to include the following interventions: Electrical Stimulation unattended, Gait Training, Manual Therapy, Moist Heat/ Ice, Neuromuscular Re-ed, Patient Education, Therapeutic Activities, and Therapeutic Exercise.      Continue Plan of Care per PT order to progress patient toward rehab goals as tolerated by patient.   Francisco Bell, PT, DPT     6/14/2024

## 2024-06-17 ENCOUNTER — CLINICAL SUPPORT (OUTPATIENT)
Dept: REHABILITATION | Facility: HOSPITAL | Age: 59
End: 2024-06-17
Payer: COMMERCIAL

## 2024-06-17 DIAGNOSIS — M17.9 OSTEOARTHRITIS OF KNEE, UNSPECIFIED: Primary | ICD-10-CM

## 2024-06-17 PROCEDURE — 97530 THERAPEUTIC ACTIVITIES: CPT | Mod: CQ

## 2024-06-17 PROCEDURE — 97110 THERAPEUTIC EXERCISES: CPT | Mod: CQ

## 2024-06-17 NOTE — PROGRESS NOTES
"[facility]   Physical Therapy Treatment Note      Name: Quintin Sandoval  Clinic Number: [id number]    Therapy Diagnosis:        Encounter Diagnoses   Name Primary?    Osteoarthritis of knee, unspecified      Other specified postprocedural states          Physician: Jairo Gilmore MD     Physician Orders: PT Eval and Treat   Medical Diagnosis from Referral: R knee osteoarthritis   Evaluation Date: 5/22/2024  Authorization Period Expiration: 5/21/2025  Plan of Care Expiration: 6/28/2024  Progress Note Due: 6/28/2024  Date of Surgery: 5/20/2024  Visit # / Visits authorized: 12/18   FOTO: to be completed on visit 6     Precautions: Standard      Time In: 9:30  Time Out: 10:25  Total Billable Time: 55 minutes     Visit Date: 6/17/2024      PTA Visit #: 1/5  Subjective     Patient reports: "My knee (R) is stiff but it's not really hurting".     He was compliant with home exercise program.  Response to previous treatment: No adverse effects   Functional change: progression to straight cane for short distance ambulation    Pain: 2/10  Location: right knee      Objective      Objective Measures updated at progress report unless specified.     Treatment     Leno received the treatments listed below:      Therapeutic exercises to develop strength, endurance, ROM, and flexibility for 42 minutes including: see flowsheet below   Therapeutic activities to improve functional performance for 13 minutes including: see flowsheet below     Ther- Ex Reps   Nustep/Bike 8 minutes    Wedge 2 minutes    Hamstring Stretch 3 x 20"    Heelslides seated  30 x 3"     LAQ's 2 x 10 1.5#*   Hamstring Curls 3 x 10 red TB *   SAQ's 2 x 10 3", 1.5# *    Straight Leg Raises 10 x, 1.5#    Sidelying Hip ABD  2 x 10    Heelslides supine  30 x (105 degrees)    Hip adduction  30 x 3"            Ther-Act    Heel Raises 2 x 10 *   Mini Squats 2 x 10 *   3 way hip ----------------   Sit <> stands  10 x *   Forward Step ups  10 " x each LE * low step    Lateral Step ups  10 x each LE* low step    Standing bilateral hip and knee flexion   2 x 10 * (changed to bilateral)                      Patient Education and Home Exercises       Education provided:   - Plan of Care, Home exercise program, Delayed onset muscle soreness     Written Home Exercises Provided: Patient instructed to cont prior HEP. Exercises were reviewed and Leno was able to demonstrate them prior to the end of the session.  Leno demonstrated good  understanding of the education provided. See Electronic Medical Record under Patient Instructions for exercises provided during therapy sessions    Assessment     Quintin is a 58 y.o. male referred to outpatient Physical Therapy with a medical diagnosis of R TKA. Patient presents with R knee pain, decreased R knee ROM, impaired R lower extremity muscle strength and motor control, and edema. Patient's impairments are limiting his independence and activity tolerance with mobility tasks.  PT progressed patient to warm-up on rec bike to increase R knee flexion active range of motion.  Increased reps/resistance of therapeutic exercises and added to therapeutic activities as tolerated by patient.  Patient is able to progress through completion of PT treatment session without adverse effects noted or reported.     Leno Is progressing well towards his goals.   Patient prognosis is Good.     Patient will continue to benefit from skilled outpatient physical therapy to address the deficits listed in the problem list box on initial evaluation, provide pt/family education and to maximize pt's level of independence in the home and community environment.     Patient's spiritual, cultural and educational needs considered and pt agreeable to plan of care and goals.     Anticipated barriers to physical therapy: N/A    Goals:   Short Term Goals: 3 weeks   Patient will independently complete Home exercise program with correct form.   Patient will report  decreased pain to less than or equal to 3/10 for improved quality of life.   Patient will independently transfer sit to stand with no UE use for increased independence with community transfers.      Long Term Goals: 6 weeks   Pt will increase R knee flexion to 120, knee extension to 0 degrees, and quad lag to 0 degrees to allow patient normal gait pattern.  Pt will increase R knee strength to 4/5 to allow patient to safely return to prior level of function.  Pt will report decrease in pain to less than or equal to 2/10 to improve quality of life.  Pt will complete 5 x sit<> less than or equal to 15 seconds indicating increased LE power.   Pt will negotiate up and down stairs with a reciprocal pattern for return to stair climbing at work.   Pt will ambulate with gait speed greater than or equal to 3.25 feet per second.     Plan   Plan of care Certification: 5/22/2024 to 6/28/2024.  Outpatient Physical Therapy 3 times weekly for 6 weeks to include the following interventions: Electrical Stimulation unattended, Gait Training, Manual Therapy, Moist Heat/ Ice, Neuromuscular Re-ed, Patient Education, Therapeutic Activities, and Therapeutic Exercise.      Continue Plan of Care per PT order to progress patient toward rehab goals as tolerated by patient.   STAN Reyna     6/17/2024

## 2024-06-18 ENCOUNTER — CLINICAL SUPPORT (OUTPATIENT)
Dept: REHABILITATION | Facility: HOSPITAL | Age: 59
End: 2024-06-18
Payer: COMMERCIAL

## 2024-06-18 DIAGNOSIS — M17.9 OSTEOARTHRITIS OF KNEE, UNSPECIFIED: Primary | ICD-10-CM

## 2024-06-18 PROCEDURE — 97110 THERAPEUTIC EXERCISES: CPT | Mod: CQ

## 2024-06-18 PROCEDURE — 97530 THERAPEUTIC ACTIVITIES: CPT | Mod: CQ

## 2024-06-18 NOTE — PROGRESS NOTES
"[facility]   Physical Therapy Treatment Note      Name: Quintin Sandoval  Clinic Number: [id number]    Therapy Diagnosis:        Encounter Diagnoses   Name Primary?    Osteoarthritis of knee, unspecified      Other specified postprocedural states          Physician: Jairo Gilmore MD     Physician Orders: PT Eval and Treat   Medical Diagnosis from Referral: R knee osteoarthritis   Evaluation Date: 5/22/2024  Authorization Period Expiration: 5/21/2025  Plan of Care Expiration: 6/28/2024  Progress Note Due: 6/28/2024  Date of Surgery: 5/20/2024  Visit # / Visits authorized: 13/18   FOTO: to be completed on visit 6     Precautions: Standard      Time In: 8:04  Time Out: 8:50  Total Billable Time: 47 minutes     Visit Date: 6/18/2024      PTA Visit #: 2/5  Subjective     Patient reports:  "I have a little tightness on the outside of my leg right below my knee".     He was compliant with home exercise program.  Response to previous treatment: No adverse effects   Functional change: progression to straight cane for short distance ambulation    Pain: 2/10  Location: right knee      Objective      Objective Measures updated at progress report unless specified.     Treatment     Leno received the treatments listed below:      Therapeutic exercises to develop strength, endurance, ROM, and flexibility for 35 minutes including: see flowsheet below   Therapeutic activities to improve functional performance for 12  minutes including: see flowsheet below     Ther- Ex Reps   Nustep/Bike 8 minutes    Wedge 2 minutes    Hamstring Stretch 3 x 20"    Heelslides seated  30 x 3"     LAQ's 2 x 10 1.5#*   Hamstring Curls 3 x 10 red TB *   SAQ's 2 x 10 3", 1.5# *    Straight Leg Raises 20 x, 1.5# *   Sidelying Hip ABD  2 x 10, 1.5# *   Heelslides supine  30 x (105 degrees)    Hip adduction  30 x 3"            Ther-Act    Heel Raises 3 x 10 *   Mini Squats 2 x 10 *   Sit <> stands  10 x *   Forward Step ups  " "10 x each LE * low step    Lateral Step ups  10 x each LE* low step    Standing bilateral hip and knee flexion   2 x 10 * (changed to bilateral)                      Patient Education and Home Exercises       Education provided:   - Plan of Care, Home exercise program, Delayed onset muscle soreness     Written Home Exercises Provided: Patient instructed to cont prior HEP. Exercises were reviewed and Leno was able to demonstrate them prior to the end of the session.  Leno demonstrated good  understanding of the education provided. See Electronic Medical Record under Patient Instructions for exercises provided during therapy sessions    Assessment     Quintin is a 58 y.o. male referred to outpatient Physical Therapy with a medical diagnosis of R TKA. Patient presents with R knee pain, decreased R knee ROM, impaired R lower extremity muscle strength and motor control, and edema. Patient's impairments are limiting his independence and activity tolerance with mobility tasks.  Pt requires increased time and effort to progress through completion of therapeutic exercises due to reports of Right knee pain, decreased strength, and decreased ROM.  Patient requires decreased time and effort to progress through completion of PT treatment session.  Noted improved active range of motion Right knee extension to flexion.  Patient continues to present with antalgic gait initially after sitting for a while, but patient states "It feels like it gets stiff but after I'm up for a minute and walk it out it gets better".          Leno Is progressing well towards his goals.   Patient prognosis is Good.     Patient will continue to benefit from skilled outpatient physical therapy to address the deficits listed in the problem list box on initial evaluation, provide pt/family education and to maximize pt's level of independence in the home and community environment.     Patient's spiritual, cultural and educational needs considered and pt " agreeable to plan of care and goals.     Anticipated barriers to physical therapy: N/A    Goals:   Short Term Goals: 3 weeks   Patient will independently complete Home exercise program with correct form.   Patient will report decreased pain to less than or equal to 3/10 for improved quality of life.   Patient will independently transfer sit to stand with no UE use for increased independence with community transfers.      Long Term Goals: 6 weeks   Pt will increase R knee flexion to 120, knee extension to 0 degrees, and quad lag to 0 degrees to allow patient normal gait pattern.  Pt will increase R knee strength to 4/5 to allow patient to safely return to prior level of function.  Pt will report decrease in pain to less than or equal to 2/10 to improve quality of life.  Pt will complete 5 x sit<> less than or equal to 15 seconds indicating increased LE power.   Pt will negotiate up and down stairs with a reciprocal pattern for return to stair climbing at work.   Pt will ambulate with gait speed greater than or equal to 3.25 feet per second.     Plan   Plan of care Certification: 5/22/2024 to 6/28/2024.  Outpatient Physical Therapy 3 times weekly for 6 weeks to include the following interventions: Electrical Stimulation unattended, Gait Training, Manual Therapy, Moist Heat/ Ice, Neuromuscular Re-ed, Patient Education, Therapeutic Activities, and Therapeutic Exercise.      Continue Plan of Care per PT order to progress patient toward rehab goals as tolerated by patient.   STAN Reyna     6/18/2024

## 2024-06-25 ENCOUNTER — CLINICAL SUPPORT (OUTPATIENT)
Dept: REHABILITATION | Facility: HOSPITAL | Age: 59
End: 2024-06-25
Payer: COMMERCIAL

## 2024-06-25 DIAGNOSIS — M17.9 OSTEOARTHRITIS OF KNEE, UNSPECIFIED: Primary | ICD-10-CM

## 2024-06-25 PROCEDURE — 97530 THERAPEUTIC ACTIVITIES: CPT | Mod: CQ

## 2024-06-25 PROCEDURE — 97110 THERAPEUTIC EXERCISES: CPT | Mod: CQ

## 2024-06-25 NOTE — PROGRESS NOTES
"[facility]   Physical Therapy Treatment Note      Name: Quintin Sandoval  Clinic Number: [id number]    Therapy Diagnosis:        Encounter Diagnoses   Name Primary?    Osteoarthritis of knee, unspecified      Other specified postprocedural states          Physician: Jairo Gilmore MD     Physician Orders: PT Eval and Treat   Medical Diagnosis from Referral: R knee osteoarthritis   Evaluation Date: 5/22/2024  Authorization Period Expiration: 5/21/2025  Plan of Care Expiration: 6/28/2024  Progress Note Due: 6/28/2024  Date of Surgery: 5/20/2024  Visit # / Visits authorized: 14/18   FOTO: to be completed on visit 6     Precautions: Standard      Time In: 8:05  Time Out: 8:50  Total Billable Time: 45 minutes     Visit Date: 6/25/2024      PTA Visit #: 3/5  Subjective     Patient reports:  No new complaints.  Reports stiffness in Right knee     He was compliant with home exercise program.  Response to previous treatment: No adverse effects   Functional change: progression to straight cane for short distance ambulation    Pain: 2/10  Location: right knee      Objective      Objective Measures updated at progress report unless specified.     Treatment     Leno received the treatments listed below:      Therapeutic exercises to develop strength, endurance, ROM, and flexibility for 32 minutes including: see flowsheet below   Therapeutic activities to improve functional performance for 13 minutes including: see flowsheet below     Ther- Ex Reps   Nustep/Bike 8 minutes    Wedge 2 minutes    Hamstring Stretch 3 x 20"    Heelslides seated  30 x 3"     LAQ's 2 x 10, 2.5# *    Hamstring Curls 3 x 10 red TB *   SAQ's 2 x 10 3", 2.5#*    Straight Leg Raises 20 x, 1.5# *   Sidelying Hip ABD  2 x 10, 1.5# *   Heelslides supine  30 x (110 degrees)    Hip adduction  30 x 3"            Ther-Act    Heel Raises 3 x 10    Mini Squats 2 x 10    Sit <> stands  10 x 2*   Forward Step ups  10 x 2* each LE * low " step    Lateral Step ups  10 x 2* each LE* low step    Standing bilateral hip and knee flexion   2 x 10 * (changed to bilateral)                      Patient Education and Home Exercises       Education provided:   - Plan of Care, Home exercise program, Delayed onset muscle soreness     Written Home Exercises Provided: Patient instructed to cont prior HEP. Exercises were reviewed and Leno was able to demonstrate them prior to the end of the session.  Leno demonstrated good  understanding of the education provided. See Electronic Medical Record under Patient Instructions for exercises provided during therapy sessions    Assessment     Quintin is a 58 y.o. male referred to outpatient Physical Therapy with a medical diagnosis of R TKA. Patient presents with R knee pain, decreased R knee ROM, impaired R lower extremity muscle strength and motor control, and edema. Patient's impairments are limiting his independence and activity tolerance with mobility tasks.  Pt requires increased time and effort to progress through completion of therapeutic exercises due to reports of Right knee pain, decreased strength, and decreased ROM.  Increased reps/resistance of therapeutic exercises and therapeutic activities as tolerated by patient with min verbal and visual cues for proper form and technique.  No adverse effects noted or reported.     Leno Is progressing well towards his goals.   Patient prognosis is Good.     Patient will continue to benefit from skilled outpatient physical therapy to address the deficits listed in the problem list box on initial evaluation, provide pt/family education and to maximize pt's level of independence in the home and community environment.     Patient's spiritual, cultural and educational needs considered and pt agreeable to plan of care and goals.     Anticipated barriers to physical therapy: N/A    Goals:   Short Term Goals: 3 weeks   Patient will independently complete Home exercise program with  correct form.   Patient will report decreased pain to less than or equal to 3/10 for improved quality of life.   Patient will independently transfer sit to stand with no UE use for increased independence with community transfers.      Long Term Goals: 6 weeks   Pt will increase R knee flexion to 120, knee extension to 0 degrees, and quad lag to 0 degrees to allow patient normal gait pattern.  Pt will increase R knee strength to 4/5 to allow patient to safely return to prior level of function.  Pt will report decrease in pain to less than or equal to 2/10 to improve quality of life.  Pt will complete 5 x sit<> less than or equal to 15 seconds indicating increased LE power.   Pt will negotiate up and down stairs with a reciprocal pattern for return to stair climbing at work.   Pt will ambulate with gait speed greater than or equal to 3.25 feet per second.     Plan   Plan of care Certification: 5/22/2024 to 6/28/2024.  Outpatient Physical Therapy 3 times weekly for 6 weeks to include the following interventions: Electrical Stimulation unattended, Gait Training, Manual Therapy, Moist Heat/ Ice, Neuromuscular Re-ed, Patient Education, Therapeutic Activities, and Therapeutic Exercise.      Continue Plan of Care per PT order to progress patient toward rehab goals as tolerated by patient.   STAN Reyna     6/25/2024

## 2024-06-26 ENCOUNTER — CLINICAL SUPPORT (OUTPATIENT)
Dept: REHABILITATION | Facility: HOSPITAL | Age: 59
End: 2024-06-26
Payer: COMMERCIAL

## 2024-06-26 DIAGNOSIS — M17.9 OSTEOARTHRITIS OF KNEE, UNSPECIFIED: Primary | ICD-10-CM

## 2024-06-26 PROBLEM — M25.561 ACUTE PAIN OF RIGHT KNEE: Status: RESOLVED | Noted: 2023-03-07 | Resolved: 2024-06-26

## 2024-06-26 PROCEDURE — 97530 THERAPEUTIC ACTIVITIES: CPT

## 2024-06-26 PROCEDURE — 97110 THERAPEUTIC EXERCISES: CPT

## 2024-06-26 NOTE — PROGRESS NOTES
"[facility]   Physical Therapy Treatment Note      Name: Quintin Sandoval  Clinic Number: [id number]    Therapy Diagnosis:        Encounter Diagnoses   Name Primary?    Osteoarthritis of knee, unspecified      Other specified postprocedural states          Physician: Jairo Gilmore MD     Physician Orders: PT Eval and Treat   Medical Diagnosis from Referral: R knee osteoarthritis   Evaluation Date: 5/22/2024  Authorization Period Expiration: 5/21/2025  Plan of Care Expiration: 6/28/2024  Progress Note Due: 6/28/2024  Date of Surgery: 5/20/2024  Visit # / Visits authorized: 15/18   FOTO: to be completed on visit 6     Precautions: Standard      Time In: 8:47  Time Out: 9:50  Total Billable Time: 63 minutes     Visit Date: 6/26/2024      PTA Visit #: 0/5  Subjective     Patient reports:  "It feels a little stiff today."     He was compliant with home exercise program.  Response to previous treatment: No adverse effects   Functional change: progression to straight cane for short distance ambulation    Pain: 2/10  Location: right knee      Objective      Objective Measures updated at progress report unless specified.     Treatment     Leno received the treatments listed below:      Therapeutic exercises to develop strength, endurance, ROM, and flexibility for 33 minutes including: see flowsheet below   Therapeutic activities to improve functional performance for 30 minutes including: see flowsheet below   *indicates increases in reps or resistance during this treatment session    Ther- Ex Reps   Nustep/Bike 8 minutes    Wedge 2 minutes    Hamstring Stretch 3 x 20"    Heelslides seated  30 x 3"     LAQ's 3 x 10, 2.5# *    Hamstring Curls 2 x 10 green TB *   Straight Leg Raises 20 x, 1.5# *   Sidelying Hip ABD  2 x 10, 1.5# *   Heelslides supine  30 x (109 degrees)    Sidelying hip ADD  2 x 10 1.5#*           Ther-Act    Heel Raises 2 x 10 low step *   Ball Squats 2 x 10 *   Sit <> stands  " 10 x 2   Forward Step ups 2 x 10 high step*   Lateral Step ups 2 x 10 high step *   Standing R hip and knee flexion   2 x 10  red TB *                      Patient Education and Home Exercises       Education provided:   - Plan of Care, Home exercise program, Delayed onset muscle soreness     Written Home Exercises Provided: Patient instructed to cont prior HEP. Exercises were reviewed and Leno was able to demonstrate them prior to the end of the session.  Leno demonstrated good  understanding of the education provided. See Electronic Medical Record under Patient Instructions for exercises provided during therapy sessions    Assessment     Quintin is a 58 y.o. male referred to outpatient Physical Therapy with a medical diagnosis of R TKA. Patient presents with R knee pain, decreased R knee ROM, impaired R lower extremity muscle strength and motor control, and edema. Patient's impairments are limiting his independence and activity tolerance with mobility tasks. PT increased reps and resistance as described on flowsheet above. PT also progressed standing tasks to increase stability challenge for R knee. Patient required visual and verbal cues for proper form and speed to increase effectiveness of exercises. Patient had no reports of adverse effects to treatment tasks.     Leno Is progressing well towards his goals.   Patient prognosis is Good.     Patient will continue to benefit from skilled outpatient physical therapy to address the deficits listed in the problem list box on initial evaluation, provide pt/family education and to maximize pt's level of independence in the home and community environment.     Patient's spiritual, cultural and educational needs considered and pt agreeable to plan of care and goals.     Anticipated barriers to physical therapy: N/A    Goals:   Short Term Goals: 3 weeks   Patient will independently complete Home exercise program with correct form.   Patient will report decreased pain to less  than or equal to 3/10 for improved quality of life.   Patient will independently transfer sit to stand with no UE use for increased independence with community transfers.      Long Term Goals: 6 weeks   Pt will increase R knee flexion to 120, knee extension to 0 degrees, and quad lag to 0 degrees to allow patient normal gait pattern.  Pt will increase R knee strength to 4/5 to allow patient to safely return to prior level of function.  Pt will report decrease in pain to less than or equal to 2/10 to improve quality of life.  Pt will complete 5 x sit<> less than or equal to 15 seconds indicating increased LE power.   Pt will negotiate up and down stairs with a reciprocal pattern for return to stair climbing at work.   Pt will ambulate with gait speed greater than or equal to 3.25 feet per second.     Plan   Plan of care Certification: 5/22/2024 to 6/28/2024.  Outpatient Physical Therapy 3 times weekly for 6 weeks to include the following interventions: Electrical Stimulation unattended, Gait Training, Manual Therapy, Moist Heat/ Ice, Neuromuscular Re-ed, Patient Education, Therapeutic Activities, and Therapeutic Exercise.      Continue Plan of Care per PT order to progress patient toward rehab goals as tolerated by patient.   Francisco Bell, PT, DPT       6/26/2024

## 2024-06-27 ENCOUNTER — OFFICE VISIT (OUTPATIENT)
Dept: FAMILY MEDICINE | Facility: CLINIC | Age: 59
End: 2024-06-27
Payer: COMMERCIAL

## 2024-06-27 VITALS
SYSTOLIC BLOOD PRESSURE: 136 MMHG | TEMPERATURE: 98 F | BODY MASS INDEX: 31.06 KG/M2 | WEIGHT: 234.38 LBS | HEART RATE: 72 BPM | OXYGEN SATURATION: 97 % | HEIGHT: 73 IN | DIASTOLIC BLOOD PRESSURE: 88 MMHG

## 2024-06-27 DIAGNOSIS — E11.9 CONTROLLED TYPE 2 DIABETES MELLITUS WITHOUT COMPLICATION, WITHOUT LONG-TERM CURRENT USE OF INSULIN: ICD-10-CM

## 2024-06-27 DIAGNOSIS — J01.00 ACUTE NON-RECURRENT MAXILLARY SINUSITIS: Primary | ICD-10-CM

## 2024-06-27 DIAGNOSIS — R05.1 ACUTE COUGH: ICD-10-CM

## 2024-06-27 LAB
ANION GAP SERPL CALCULATED.3IONS-SCNC: 10 MMOL/L (ref 7–16)
BASOPHILS # BLD AUTO: 0.08 K/UL (ref 0–0.2)
BASOPHILS NFR BLD AUTO: 1.4 % (ref 0–1)
BUN SERPL-MCNC: 17 MG/DL (ref 7–18)
BUN/CREAT SERPL: 22 (ref 6–20)
CALCIUM SERPL-MCNC: 9.2 MG/DL (ref 8.5–10.1)
CHLORIDE SERPL-SCNC: 105 MMOL/L (ref 98–107)
CO2 SERPL-SCNC: 27 MMOL/L (ref 21–32)
CREAT SERPL-MCNC: 0.78 MG/DL (ref 0.7–1.3)
DIFFERENTIAL METHOD BLD: ABNORMAL
EGFR (NO RACE VARIABLE) (RUSH/TITUS): 105 ML/MIN/1.73M2
EOSINOPHIL # BLD AUTO: 0.66 K/UL (ref 0–0.5)
EOSINOPHIL NFR BLD AUTO: 11.8 % (ref 1–4)
ERYTHROCYTE [DISTWIDTH] IN BLOOD BY AUTOMATED COUNT: 13.2 % (ref 11.5–14.5)
EST. AVERAGE GLUCOSE BLD GHB EST-MCNC: 140 MG/DL
GLUCOSE SERPL-MCNC: 179 MG/DL (ref 74–106)
HBA1C MFR BLD HPLC: 6.5 % (ref 4.5–6.6)
HCT VFR BLD AUTO: 41.8 % (ref 40–54)
HGB BLD-MCNC: 14 G/DL (ref 13.5–18)
IMM GRANULOCYTES # BLD AUTO: 0.02 K/UL (ref 0–0.04)
IMM GRANULOCYTES NFR BLD: 0.4 % (ref 0–0.4)
LYMPHOCYTES # BLD AUTO: 1.53 K/UL (ref 1–4.8)
LYMPHOCYTES NFR BLD AUTO: 27.3 % (ref 27–41)
MCH RBC QN AUTO: 30.4 PG (ref 27–31)
MCHC RBC AUTO-ENTMCNC: 33.5 G/DL (ref 32–36)
MCV RBC AUTO: 90.7 FL (ref 80–96)
MONOCYTES # BLD AUTO: 0.62 K/UL (ref 0–0.8)
MONOCYTES NFR BLD AUTO: 11.1 % (ref 2–6)
MPC BLD CALC-MCNC: 10.3 FL (ref 9.4–12.4)
NEUTROPHILS # BLD AUTO: 2.7 K/UL (ref 1.8–7.7)
NEUTROPHILS NFR BLD AUTO: 48 % (ref 53–65)
NRBC # BLD AUTO: 0 X10E3/UL
NRBC, AUTO (.00): 0 %
PLATELET # BLD AUTO: 299 K/UL (ref 150–400)
POTASSIUM SERPL-SCNC: 4.2 MMOL/L (ref 3.5–5.1)
RBC # BLD AUTO: 4.61 M/UL (ref 4.6–6.2)
SODIUM SERPL-SCNC: 138 MMOL/L (ref 136–145)
WBC # BLD AUTO: 5.61 K/UL (ref 4.5–11)

## 2024-06-27 PROCEDURE — 3008F BODY MASS INDEX DOCD: CPT | Mod: CPTII,,, | Performed by: FAMILY MEDICINE

## 2024-06-27 PROCEDURE — 85025 COMPLETE CBC W/AUTO DIFF WBC: CPT | Mod: ,,, | Performed by: CLINICAL MEDICAL LABORATORY

## 2024-06-27 PROCEDURE — 3079F DIAST BP 80-89 MM HG: CPT | Mod: CPTII,,, | Performed by: FAMILY MEDICINE

## 2024-06-27 PROCEDURE — 3075F SYST BP GE 130 - 139MM HG: CPT | Mod: CPTII,,, | Performed by: FAMILY MEDICINE

## 2024-06-27 PROCEDURE — 80048 BASIC METABOLIC PNL TOTAL CA: CPT | Mod: ,,, | Performed by: CLINICAL MEDICAL LABORATORY

## 2024-06-27 PROCEDURE — 96372 THER/PROPH/DIAG INJ SC/IM: CPT | Mod: ,,, | Performed by: FAMILY MEDICINE

## 2024-06-27 PROCEDURE — 4010F ACE/ARB THERAPY RXD/TAKEN: CPT | Mod: CPTII,,, | Performed by: FAMILY MEDICINE

## 2024-06-27 PROCEDURE — 3044F HG A1C LEVEL LT 7.0%: CPT | Mod: CPTII,,, | Performed by: FAMILY MEDICINE

## 2024-06-27 PROCEDURE — 1159F MED LIST DOCD IN RCRD: CPT | Mod: CPTII,,, | Performed by: FAMILY MEDICINE

## 2024-06-27 PROCEDURE — 83036 HEMOGLOBIN GLYCOSYLATED A1C: CPT | Mod: ,,, | Performed by: CLINICAL MEDICAL LABORATORY

## 2024-06-27 PROCEDURE — 99214 OFFICE O/P EST MOD 30 MIN: CPT | Mod: 25,,, | Performed by: FAMILY MEDICINE

## 2024-06-27 RX ORDER — AZITHROMYCIN 250 MG/1
TABLET, FILM COATED ORAL
Qty: 6 TABLET | Refills: 0 | Status: SHIPPED | OUTPATIENT
Start: 2024-06-27

## 2024-06-27 RX ORDER — DEXAMETHASONE SODIUM PHOSPHATE 4 MG/ML
2 INJECTION, SOLUTION INTRA-ARTICULAR; INTRALESIONAL; INTRAMUSCULAR; INTRAVENOUS; SOFT TISSUE
Status: COMPLETED | OUTPATIENT
Start: 2024-06-27 | End: 2024-06-27

## 2024-06-27 RX ORDER — CEFTRIAXONE 1 G/1
1 INJECTION, POWDER, FOR SOLUTION INTRAMUSCULAR; INTRAVENOUS
Status: COMPLETED | OUTPATIENT
Start: 2024-06-27 | End: 2024-06-27

## 2024-06-27 RX ORDER — DEXCHLORPHENIRAMINE MALEATE, DEXTROMETHORPHAN HBR, PHENYLEPHRINE HCL 1; 10; 5 MG/5ML; MG/5ML; MG/5ML
10 SYRUP ORAL EVERY 6 HOURS PRN
Qty: 240 ML | Refills: 0 | Status: SHIPPED | OUTPATIENT
Start: 2024-06-27

## 2024-06-27 RX ORDER — METHYLPREDNISOLONE ACETATE 80 MG/ML
20 INJECTION, SUSPENSION INTRA-ARTICULAR; INTRALESIONAL; INTRAMUSCULAR; SOFT TISSUE
Status: COMPLETED | OUTPATIENT
Start: 2024-06-27 | End: 2024-06-27

## 2024-06-27 RX ADMIN — DEXAMETHASONE SODIUM PHOSPHATE 2 MG: 4 INJECTION, SOLUTION INTRA-ARTICULAR; INTRALESIONAL; INTRAMUSCULAR; INTRAVENOUS; SOFT TISSUE at 07:06

## 2024-06-27 RX ADMIN — METHYLPREDNISOLONE ACETATE 20 MG: 80 INJECTION, SUSPENSION INTRA-ARTICULAR; INTRALESIONAL; INTRAMUSCULAR; SOFT TISSUE at 07:06

## 2024-06-27 RX ADMIN — CEFTRIAXONE 1 G: 1 INJECTION, POWDER, FOR SOLUTION INTRAMUSCULAR; INTRAVENOUS at 07:06

## 2024-06-27 NOTE — PROGRESS NOTES
Wilfred Menon MD   [facility]  [address] [place] [address]     PATIENT NAME: Quintin Sandoval  : 1965  DATE: 24  MRN: [id number]      Billing Provider: Wilfred Menon MD  Level of Service: MA OFFICE/OUTPT VISIT, EST, LEVL IV, 30-39 MIN  Patient PCP Information       Provider PCP Type    Wilfred Menon MD General            Reason for Visit / Chief Complaint: Sinusitis (Sore throat, head congestion, runny nose x 3 days. Coughing and chest feels tight since last night.)         History of Present Illness / Problem Focused Workflow     Quintin Sandoval presents to the clinic with Sinusitis (Sore throat, head congestion, runny nose x 3 days. Coughing and chest feels tight since last night.)     HPI    Review of Systems     Review of Systems   Constitutional:  Negative for activity change, appetite change, fatigue and fever.   HENT:  Positive for nasal congestion, rhinorrhea, sinus pressure/congestion and sore throat. Negative for ear pain and hearing loss.    Respiratory:  Positive for cough. Negative for chest tightness and shortness of breath.    Cardiovascular:  Negative for chest pain and palpitations.   Gastrointestinal:  Negative for abdominal pain and fecal incontinence.   Genitourinary:  Negative for bladder incontinence, difficulty urinating and erectile dysfunction.   Musculoskeletal:  Negative for arthralgias.   Integumentary:  Negative for rash.   Neurological:  Negative for dizziness and headaches.        Medical / Social / Family History     Past Medical History:   Diagnosis Date    Hyperlipidemia        Past Surgical History:   Procedure Laterality Date    ANGIOPLASTY      HERNIA REPAIR      REPAIR OF MENISCUS OF KNEE Right     SINUS SURGERY      x 4       Social History  Quintin Sandoval  reports that he has never smoked. He has quit using smokeless tobacco. He reports current alcohol use. He reports that he does not use  drugs.    Family History  Quintin Sandoval  family history includes Diabetes in his mother; Heart disease in his mother.    Medications and Allergies     Medications  Outpatient Medications Marked as Taking for the 6/27/24 encounter (Office Visit) with Wilfred Menon MD   Medication Sig Dispense Refill    aspirin (ECOTRIN) 81 MG EC tablet Take 81 mg by mouth once daily.      atorvastatin (LIPITOR) 80 MG tablet Take 80 mg by mouth once daily.      azelastine (ASTELIN) 137 mcg (0.1 %) nasal spray Nasal for 30      b complex vitamins tablet Take 1 tablet by mouth once daily.      blood sugar diagnostic Strp Check twice daily and as needed for DMII 100 each 2    blood-glucose meter kit Use as instructed 1 each 0    carvediloL (COREG) 6.25 MG tablet Take 6.25 mg by mouth 2 (two) times daily.      ezetimibe (ZETIA) 10 mg tablet Take 10 mg by mouth every morning.      fexofenadine (ALLEGRA) 180 MG tablet Take 180 mg by mouth once daily.      fluticasone propionate (FLONASE) 50 mcg/actuation nasal spray 2 sprays (100 mcg total) by Each Nostril route once daily. 16 g 5    glipiZIDE (GLUCOTROL) 5 MG tablet Take 1 tablet (5 mg total) by mouth 2 (two) times daily before meals. 60 tablet 11    lancets (LANCETS,THIN) Misc Check twice daily and as needed for DMII 100 each 2    montelukast (SINGULAIR) 10 mg tablet TAKE 1 TABLET BY MOUTH EVERY DAY IN THE EVENING 90 tablet 1     Current Facility-Administered Medications for the 6/27/24 encounter (Office Visit) with Wilfred Menon MD   Medication Dose Route Frequency Provider Last Rate Last Admin    cefTRIAXone injection 1 g  1 g Intramuscular 1 time in Clinic/HOD Wilfred Menon MD        dexAMETHasone injection 2 mg  2 mg Intramuscular 1 time in Clinic/HOD Wilfred Menon MD        methylPREDNISolone acetate injection 20 mg  20 mg Intramuscular 1 time in Clinic/HOD Wilfred Menon MD           Allergies  Review of patient's allergies indicates:  No  Known Allergies    Physical Examination     Vitals:    06/27/24 0710   BP: 136/88   Pulse: 72   Temp: 98 °F (36.7 °C)     Physical Exam  Constitutional:       General: He is not in acute distress.     Appearance: He is not ill-appearing.   HENT:      Head: Normocephalic and atraumatic.      Right Ear: Tympanic membrane and ear canal normal.      Left Ear: Tympanic membrane and ear canal normal.      Nose: Congestion and rhinorrhea present.   Eyes:      Pupils: Pupils are equal, round, and reactive to light.   Cardiovascular:      Rate and Rhythm: Normal rate and regular rhythm.      Pulses: Normal pulses.      Heart sounds: No murmur heard.  Pulmonary:      Effort: No respiratory distress.      Breath sounds: No wheezing, rhonchi or rales.   Abdominal:      General: Bowel sounds are normal.      Palpations: Abdomen is soft.      Tenderness: There is no abdominal tenderness.      Hernia: No hernia is present.   Musculoskeletal:      Cervical back: Normal range of motion and neck supple.   Lymphadenopathy:      Cervical: No cervical adenopathy.   Skin:     General: Skin is warm and dry.   Neurological:      Mental Status: He is alert.   Psychiatric:         Behavior: Behavior normal.         Thought Content: Thought content normal.          Assessment and Plan (including Health Maintenance)   :    Plan:         Health Maintenance Due   Topic Date Due    Diabetes Urine Screening  Never done    Pneumococcal Vaccines (Age 0-64) (1 of 2 - PCV) Never done    Foot Exam  Never done    Eye Exam  Never done    HIV Screening  Never done    TETANUS VACCINE  Never done    Shingles Vaccine (1 of 2) Never done    Hemoglobin A1c  11/01/2023    Lipid Panel  05/01/2024       Problem List Items Addressed This Visit    None  Visit Diagnoses       Acute non-recurrent maxillary sinusitis    -  Primary    Acute cough        Controlled type 2 diabetes mellitus without complication, without long-term current use of insulin        Relevant  Orders    Basic Metabolic Panel    CBC Auto Differential    Hemoglobin A1C    Microalbumin/Creatinine Ratio, Urine          Acute non-recurrent maxillary sinusitis    Acute cough    Controlled type 2 diabetes mellitus without complication, without long-term current use of insulin  -     Basic Metabolic Panel; Future  -     CBC Auto Differential; Future  -     Hemoglobin A1C; Future; Expected date: 06/27/2024  -     Microalbumin/Creatinine Ratio, Urine; Future; Expected date: 06/27/2024    Other orders  -     dexAMETHasone injection 2 mg  -     methylPREDNISolone acetate injection 20 mg  -     cefTRIAXone injection 1 g  -     dexchlorphen-phenylephrine-DM (POLYTUSSIN DM,DEXCHLORPHENRMN,) 1-5-10 mg/5 mL Syrp; Take 10 mLs by mouth every 6 (six) hours as needed (cough).  Dispense: 240 mL; Refill: 0  -     azithromycin (Z-BRIANNA) 250 MG tablet; Take 2 tablets by mouth on day 1; Take 1 tablet by mouth on days 2-5  Dispense: 6 tablet; Refill: 0       Health Maintenance Topics with due status: Not Due       Topic Last Completion Date    Colorectal Cancer Screening 01/28/2021    Influenza Vaccine 09/29/2022       Procedures     Future Appointments   Date Time Provider Department Center   6/28/2024  8:00 AM [facility] P[facility]   7/1/2024  8:45 AM Francisco Bell, PT [facility]   7/3/2024  8:00 AM Saritha White PTA [facility]        No follow-ups on file.       Signature:  Wilfred Menon MD  [facility]  [address] [place] [address]  [phone] Phone  [phone] Fax    Date of encounter: 6/27/24

## 2024-06-28 ENCOUNTER — CLINICAL SUPPORT (OUTPATIENT)
Dept: REHABILITATION | Facility: HOSPITAL | Age: 59
End: 2024-06-28
Payer: COMMERCIAL

## 2024-06-28 DIAGNOSIS — M17.9 OSTEOARTHRITIS OF KNEE, UNSPECIFIED: Primary | ICD-10-CM

## 2024-06-28 PROCEDURE — 97530 THERAPEUTIC ACTIVITIES: CPT | Mod: CQ

## 2024-06-28 PROCEDURE — 97110 THERAPEUTIC EXERCISES: CPT | Mod: CQ

## 2024-06-28 NOTE — PROGRESS NOTES
"[facility]   Physical Therapy Treatment Note      Name: Quintin Sandoval  Clinic Number: [id number]    Therapy Diagnosis:        Encounter Diagnoses   Name Primary?    Osteoarthritis of knee, unspecified      Other specified postprocedural states          Physician: Jairo Gilmore MD     Physician Orders: PT Eval and Treat   Medical Diagnosis from Referral: R knee osteoarthritis   Evaluation Date: 5/22/2024  Authorization Period Expiration: 5/21/2025  Plan of Care Expiration: 6/28/2024  Progress Note Due: 6/28/2024  Date of Surgery: 5/20/2024  Visit # / Visits authorized: 16/18   FOTO: to be completed on visit 6     Precautions: Standard      Time In: 8:10  Time Out: 8:55  Total Billable Time: 45 minutes     Visit Date: 6/28/2024      PTA Visit #: 1/5  Subjective     Patient reports:  No new complaints.     He was compliant with home exercise program.  Response to previous treatment: No adverse effects   Functional change: progression to straight cane for short distance ambulation    Pain: 2/10  Location: right knee      Objective      Objective Measures updated at progress report unless specified.     Treatment     Leno received the treatments listed below:      Therapeutic exercises to develop strength, endurance, ROM, and flexibility for 25 minutes including: see flowsheet below     Therapeutic activities to improve functional performance for 20 minutes including: see flowsheet below   *indicates increases in reps or resistance during this treatment session    Ther- Ex Reps   Nustep/Bike 8 minutes    Wedge 2 minutes    Hamstring Stretch 3 x 20"    Heelslides seated  30 x 3"  (115 degrees) *    LAQ's 3 x 10, 2.5#    Hamstring Curls 2 x 10 green TB *   Straight Leg Raises 20 x, 1.5# *   Sidelying Hip ABD  2 x 10, 1.5# *   Heelslides supine  30 x (112 degrees)    Sidelying hip ADD  2 x 10 1.5#*           Ther-Act    Heel Raises 2 x 10 low step *    Ball Squats 2 x 10 *   Sit <> " stands  10 x 2   Forward Step ups 2 x 10 high step   Lateral Step ups 2 x 10 high step    Standing R hip and knee flexion   2 x 10  red TB * (not completed)    3 way lunge step  6 x *                   Patient Education and Home Exercises       Education provided:   - Plan of Care, Home exercise program, Delayed onset muscle soreness     Written Home Exercises Provided: Patient instructed to cont prior HEP. Exercises were reviewed and Leno was able to demonstrate them prior to the end of the session.  Leno demonstrated good  understanding of the education provided. See Electronic Medical Record under Patient Instructions for exercises provided during therapy sessions    Assessment     Quintin is a 58 y.o. male referred to outpatient Physical Therapy with a medical diagnosis of R TKA. Patient presents with R knee pain, decreased R knee ROM, impaired R lower extremity muscle strength and motor control, and edema. Patient's impairments are limiting his independence and activity tolerance with mobility tasks. Patient requires verbal and visual cues to complete 50% of therapeutic activities with proper alignment, speed of movement, count, and hold times.  Noted improved active range of motion Right knee flexion at end of treatment session.       Leno Is progressing well towards his goals.   Patient prognosis is Good.     Patient will continue to benefit from skilled outpatient physical therapy to address the deficits listed in the problem list box on initial evaluation, provide pt/family education and to maximize pt's level of independence in the home and community environment.     Patient's spiritual, cultural and educational needs considered and pt agreeable to plan of care and goals.     Anticipated barriers to physical therapy: N/A    Goals:   Short Term Goals: 3 weeks   Patient will independently complete Home exercise program with correct form.   Patient will report decreased pain to less than or equal to 3/10 for  improved quality of life.   Patient will independently transfer sit to stand with no UE use for increased independence with community transfers.      Long Term Goals: 6 weeks   Pt will increase R knee flexion to 120, knee extension to 0 degrees, and quad lag to 0 degrees to allow patient normal gait pattern.  Pt will increase R knee strength to 4/5 to allow patient to safely return to prior level of function.  Pt will report decrease in pain to less than or equal to 2/10 to improve quality of life.  Pt will complete 5 x sit<> less than or equal to 15 seconds indicating increased LE power.   Pt will negotiate up and down stairs with a reciprocal pattern for return to stair climbing at work.   Pt will ambulate with gait speed greater than or equal to 3.25 feet per second.     Plan   Plan of care Certification: 5/22/2024 to 6/28/2024.  Outpatient Physical Therapy 3 times weekly for 6 weeks to include the following interventions: Electrical Stimulation unattended, Gait Training, Manual Therapy, Moist Heat/ Ice, Neuromuscular Re-ed, Patient Education, Therapeutic Activities, and Therapeutic Exercise.      Continue Plan of Care per PT order to progress patient toward rehab goals as tolerated by patient.   STAN Reyna       6/28/2024

## 2024-07-01 ENCOUNTER — CLINICAL SUPPORT (OUTPATIENT)
Dept: REHABILITATION | Facility: HOSPITAL | Age: 59
End: 2024-07-01
Payer: COMMERCIAL

## 2024-07-01 DIAGNOSIS — M17.9 OSTEOARTHRITIS OF KNEE, UNSPECIFIED: Primary | ICD-10-CM

## 2024-07-01 PROCEDURE — 97010 HOT OR COLD PACKS THERAPY: CPT

## 2024-07-01 PROCEDURE — 97110 THERAPEUTIC EXERCISES: CPT

## 2024-07-01 PROCEDURE — 97530 THERAPEUTIC ACTIVITIES: CPT

## 2024-07-01 RX ORDER — GLIPIZIDE 5 MG/1
5 TABLET ORAL
Qty: 180 TABLET | Refills: 0 | Status: SHIPPED | OUTPATIENT
Start: 2024-07-01

## 2024-07-01 NOTE — TELEPHONE ENCOUNTER
----- Message from Kayla Guo sent at 7/1/2024  1:32 PM CDT -----  Needing new prescriptions sent in to [facility] with a 90 day supply. [phone]

## 2024-07-01 NOTE — PROGRESS NOTES
"[facility]   Physical Therapy Treatment Note      Name: Quintin Sandoval  Clinic Number: [id number]    Therapy Diagnosis:        Encounter Diagnoses   Name Primary?    Osteoarthritis of knee, unspecified      Other specified postprocedural states          Physician: Jairo Gilmore MD     Physician Orders: PT Eval and Treat   Medical Diagnosis from Referral: R knee osteoarthritis   Evaluation Date: 5/22/2024  Authorization Period Expiration: 5/21/2025  Plan of Care Expiration: 8/9/2024  Progress Note Due: 8/9/2024  Date of Surgery: 5/20/2024  Visit # / Visits authorized: 17/30  FOTO: to be completed on visit 18     Precautions: Standard      Time In: 8:45  Time Out: 9:51  Total Billable Time: 66 minutes (58 minutes billed and 8 minutes not billed for ice)     Visit Date: 7/1/2024      PTA Visit #: 0/5  Subjective     Patient reports:  "I had to step up and down into my truck a lot Saturday. Sometimes I caught myself leading with my operated leg and I think that got it irritated."    He was compliant with home exercise program.  Response to previous treatment: No adverse effects   Functional change: No longer using assistive device     Pain: 3/10  Location: right knee      Objective      Objective Measures updated at progress report unless specified.     Treatment     Leno received the treatments listed below:      Therapeutic exercises to develop strength, endurance, ROM, and flexibility for 34 minutes including: see flowsheet below     Therapeutic activities to improve functional performance for 24 minutes including: see flowsheet below   *indicates increases in reps or resistance during this treatment session    Ther- Ex Reps   Nustep/Bike 8 minutes    Wedge 2 minutes    Hamstring Stretch 3 x 20"    Heelslides seated  30 x 3"     LAQ's 3 x 10, 2.5#    Hamstring Curls 2 x 10 green TB    Straight Leg Raises 20 x, 1.5#    Sidelying Hip ABD  2 x 10, 1.5#    Heelslides supine  30 x (112 " degrees)    Sidelying hip ADD  2 x 10 1.5#           Ther-Act    Heel Raises 2 x 10 low step    Ball Squats 2 x 10    Sit <> stands  10 x 2   Forward Step ups 2 x 10 high step   Lateral Step ups 2 x 10 high step    Standing R hip and knee flexion   2 x 10  red TB    3 way lunge step  8 x                   Cold Pack: applied to R knee with elevation above heart level for 8 minutes   Patient Education and Home Exercises       Education provided:   - Plan of Care, Home exercise program, Delayed onset muscle soreness     Written Home Exercises Provided: Patient instructed to cont prior HEP. Exercises were reviewed and Leno was able to demonstrate them prior to the end of the session.  Leno demonstrated good  understanding of the education provided. See Electronic Medical Record under Patient Instructions for exercises provided during therapy sessions    Assessment     Quintin is a 58 y.o. male referred to outpatient Physical Therapy with a medical diagnosis of R TKA. Patient presents with R knee pain, decreased R knee ROM, impaired R lower extremity muscle strength and motor control, and edema. Patient's impairments are limiting his independence and activity tolerance with mobility tasks.Patient progressed through therapy tasks with improved form and motor control. PT provided occasional verbal and visual cues to improve R knee eccentric control with exercises. Patient had no adverse effects to treatment tasks.       Leno Is progressing well towards his goals.   Patient prognosis is Good.     Patient will continue to benefit from skilled outpatient physical therapy to address the deficits listed in the problem list box on initial evaluation, provide pt/family education and to maximize pt's level of independence in the home and community environment.     Patient's spiritual, cultural and educational needs considered and pt agreeable to plan of care and goals.     Anticipated barriers to physical therapy: N/A    Goals:    Short Term Goals: 3 weeks   Patient will independently complete Home exercise program with correct form.   Patient will report decreased pain to less than or equal to 3/10 for improved quality of life.   Patient will independently transfer sit to stand with no UE use for increased independence with community transfers.      Long Term Goals: 6 weeks   Pt will increase R knee flexion to 120, knee extension to 0 degrees, and quad lag to 0 degrees to allow patient normal gait pattern.  Pt will increase R knee strength to 4/5 to allow patient to safely return to prior level of function.  Pt will report decrease in pain to less than or equal to 2/10 to improve quality of life.  Pt will complete 5 x sit<> less than or equal to 15 seconds indicating increased LE power.   Pt will negotiate up and down stairs with a reciprocal pattern for return to stair climbing at work.   Pt will ambulate with gait speed greater than or equal to 3.25 feet per second.     Plan   Plan of care Certification: 5/22/2024 to 8/9/2024.  Outpatient Physical Therapy 2 times weekly for 6 weeks to include the following interventions: Electrical Stimulation unattended, Gait Training, Manual Therapy, Moist Heat/ Ice, Neuromuscular Re-ed, Patient Education, Therapeutic Activities, and Therapeutic Exercise.      Continue Plan of Care per PT order to progress patient toward rehab goals as tolerated by patient.   Francisco Bell, PT, DPT     7/1/2024

## 2024-07-03 ENCOUNTER — CLINICAL SUPPORT (OUTPATIENT)
Dept: REHABILITATION | Facility: HOSPITAL | Age: 59
End: 2024-07-03
Payer: COMMERCIAL

## 2024-07-03 DIAGNOSIS — M17.9 OSTEOARTHRITIS OF KNEE, UNSPECIFIED: Primary | ICD-10-CM

## 2024-07-03 PROCEDURE — 97530 THERAPEUTIC ACTIVITIES: CPT | Mod: CQ

## 2024-07-03 PROCEDURE — 97110 THERAPEUTIC EXERCISES: CPT | Mod: CQ

## 2024-07-03 NOTE — PROGRESS NOTES
"[facility]   Physical Therapy Treatment Note      Name: Quintin Sandoval  Clinic Number: [id number]    Therapy Diagnosis:        Encounter Diagnoses   Name Primary?    Osteoarthritis of knee, unspecified      Other specified postprocedural states          Physician: Jairo Gilmore MD     Physician Orders: PT Eval and Treat   Medical Diagnosis from Referral: R knee osteoarthritis   Evaluation Date: 5/22/2024  Authorization Period Expiration: 5/21/2025  Plan of Care Expiration: 8/9/2024  Progress Note Due: 8/9/2024  Date of Surgery: 5/20/2024  Visit # / Visits authorized: 18/30  FOTO: to be completed on visit 18     Precautions: Standard      Time In: 8:01  Time Out: 8:46  Total Billable Time: 45 minutes     Visit Date: 7/3/2024      PTA Visit #: 1/5  Subjective     Patient reports:  Feeling tightness in Right Hamstring and lateral lower leg making his Right knee "feel tight".       He was compliant with home exercise program.  Response to previous treatment: No adverse effects   Functional change: No longer using assistive device     Pain: 3/10  Location: right knee      Objective      Objective Measures updated at progress report unless specified.     Treatment     Leno received the treatments listed below:      Therapeutic exercises to develop strength, endurance, ROM, and flexibility for 25 minutes including: see flowsheet below     Therapeutic activities to improve functional performance for 20 minutes including: see flowsheet below   *indicates increases in reps or resistance during this treatment session    Ther- Ex Reps   Nustep/Bike 8 minutes    Wedge 2 minutes    Hamstring Stretch 3 x 20"    Heelslides seated  30 x 3"     LAQ's 3 x 10, 2.5#    Hamstring Curls 2 x 10 green TB    Straight Leg Raises 20 x, 1.5#    Sidelying Hip ABD  2 x 10, 1.5#    Heelslides supine  30 x (110 degrees)    Sidelying hip ADD  2 x 10 1.5#           Ther-Act    Heel Raises 2 x 10 low step    Ball " Squats 2 x 10    Sit <> stands  10 x 2   Forward Step ups 2 x 10 high step   Lateral Step ups 2 x 10 high step    Standing R hip and knee flexion   2 x 10  red TB    3 way lunge step  8 x                   Not completed:  Cold Pack: applied to R knee with elevation above heart level for 8 minutes   Patient Education and Home Exercises       Education provided:   - Plan of Care, Home exercise program, Delayed onset muscle soreness     Written Home Exercises Provided: Patient instructed to cont prior HEP. Exercises were reviewed and Leno was able to demonstrate them prior to the end of the session.  Leno demonstrated good  understanding of the education provided. See Electronic Medical Record under Patient Instructions for exercises provided during therapy sessions    Assessment     Quintin is a 58 y.o. male referred to outpatient Physical Therapy with a medical diagnosis of R TKA. Patient presents with R knee pain, decreased R knee ROM, impaired R lower extremity muscle strength and motor control, and edema. Patient's impairments are limiting his independence and activity tolerance with mobility tasks.Patient progressed through therapy tasks with improved form and motor control.  Patient demonstrates improved continuity of gait with less antalgic gait noted.     Leno Is progressing well towards his goals.   Patient prognosis is Good.     Patient will continue to benefit from skilled outpatient physical therapy to address the deficits listed in the problem list box on initial evaluation, provide pt/family education and to maximize pt's level of independence in the home and community environment.     Patient's spiritual, cultural and educational needs considered and pt agreeable to plan of care and goals.     Anticipated barriers to physical therapy: N/A    Goals:   Short Term Goals: 3 weeks   Patient will independently complete Home exercise program with correct form.   Patient will report decreased pain to less than or  equal to 3/10 for improved quality of life.   Patient will independently transfer sit to stand with no UE use for increased independence with community transfers.      Long Term Goals: 6 weeks   Pt will increase R knee flexion to 120, knee extension to 0 degrees, and quad lag to 0 degrees to allow patient normal gait pattern.  Pt will increase R knee strength to 4/5 to allow patient to safely return to prior level of function.  Pt will report decrease in pain to less than or equal to 2/10 to improve quality of life.  Pt will complete 5 x sit<> less than or equal to 15 seconds indicating increased LE power.   Pt will negotiate up and down stairs with a reciprocal pattern for return to stair climbing at work.   Pt will ambulate with gait speed greater than or equal to 3.25 feet per second.     Plan   Plan of care Certification: 5/22/2024 to 8/9/2024.  Outpatient Physical Therapy 2 times weekly for 6 weeks to include the following interventions: Electrical Stimulation unattended, Gait Training, Manual Therapy, Moist Heat/ Ice, Neuromuscular Re-ed, Patient Education, Therapeutic Activities, and Therapeutic Exercise.      Continue Plan of Care per PT order to progress patient toward rehab goals as tolerated by patient.   STAN Reyna     7/3/2024

## 2024-07-08 ENCOUNTER — OFFICE VISIT (OUTPATIENT)
Dept: FAMILY MEDICINE | Facility: CLINIC | Age: 59
End: 2024-07-08
Payer: COMMERCIAL

## 2024-07-08 VITALS
HEIGHT: 73 IN | HEART RATE: 84 BPM | WEIGHT: 231.81 LBS | BODY MASS INDEX: 30.72 KG/M2 | TEMPERATURE: 98 F | OXYGEN SATURATION: 98 % | SYSTOLIC BLOOD PRESSURE: 120 MMHG | DIASTOLIC BLOOD PRESSURE: 88 MMHG

## 2024-07-08 DIAGNOSIS — J01.01 ACUTE RECURRENT MAXILLARY SINUSITIS: ICD-10-CM

## 2024-07-08 DIAGNOSIS — J40 BRONCHITIS: Primary | ICD-10-CM

## 2024-07-08 PROCEDURE — 3008F BODY MASS INDEX DOCD: CPT | Mod: CPTII,,, | Performed by: FAMILY MEDICINE

## 2024-07-08 PROCEDURE — 4010F ACE/ARB THERAPY RXD/TAKEN: CPT | Mod: CPTII,,, | Performed by: FAMILY MEDICINE

## 2024-07-08 PROCEDURE — 1159F MED LIST DOCD IN RCRD: CPT | Mod: CPTII,,, | Performed by: FAMILY MEDICINE

## 2024-07-08 PROCEDURE — 3079F DIAST BP 80-89 MM HG: CPT | Mod: CPTII,,, | Performed by: FAMILY MEDICINE

## 2024-07-08 PROCEDURE — 3074F SYST BP LT 130 MM HG: CPT | Mod: CPTII,,, | Performed by: FAMILY MEDICINE

## 2024-07-08 PROCEDURE — 3044F HG A1C LEVEL LT 7.0%: CPT | Mod: CPTII,,, | Performed by: FAMILY MEDICINE

## 2024-07-08 PROCEDURE — 96372 THER/PROPH/DIAG INJ SC/IM: CPT | Mod: ,,, | Performed by: FAMILY MEDICINE

## 2024-07-08 PROCEDURE — 99213 OFFICE O/P EST LOW 20 MIN: CPT | Mod: 25,,, | Performed by: FAMILY MEDICINE

## 2024-07-08 RX ORDER — PREDNISONE 10 MG/1
TABLET ORAL
Qty: 30 TABLET | Refills: 0 | Status: SHIPPED | OUTPATIENT
Start: 2024-07-08 | End: 2024-07-18

## 2024-07-08 RX ORDER — METHYLPREDNISOLONE ACETATE 80 MG/ML
40 INJECTION, SUSPENSION INTRA-ARTICULAR; INTRALESIONAL; INTRAMUSCULAR; SOFT TISSUE
Status: COMPLETED | OUTPATIENT
Start: 2024-07-08 | End: 2024-07-08

## 2024-07-08 RX ORDER — CIPROFLOXACIN 250 MG/1
250 TABLET, FILM COATED ORAL EVERY 8 HOURS
Qty: 20 TABLET | Refills: 0 | Status: SHIPPED | OUTPATIENT
Start: 2024-07-08

## 2024-07-08 RX ORDER — DEXAMETHASONE SODIUM PHOSPHATE 4 MG/ML
4 INJECTION, SOLUTION INTRA-ARTICULAR; INTRALESIONAL; INTRAMUSCULAR; INTRAVENOUS; SOFT TISSUE
Status: COMPLETED | OUTPATIENT
Start: 2024-07-08 | End: 2024-07-08

## 2024-07-08 RX ORDER — LINCOMYCIN HYDROCHLORIDE 300 MG/ML
600 INJECTION, SOLUTION INTRAMUSCULAR; INTRAVENOUS; SUBCONJUNCTIVAL
Status: COMPLETED | OUTPATIENT
Start: 2024-07-08 | End: 2024-07-08

## 2024-07-08 RX ADMIN — METHYLPREDNISOLONE ACETATE 40 MG: 80 INJECTION, SUSPENSION INTRA-ARTICULAR; INTRALESIONAL; INTRAMUSCULAR; SOFT TISSUE at 07:07

## 2024-07-08 RX ADMIN — DEXAMETHASONE SODIUM PHOSPHATE 4 MG: 4 INJECTION, SOLUTION INTRA-ARTICULAR; INTRALESIONAL; INTRAMUSCULAR; INTRAVENOUS; SOFT TISSUE at 07:07

## 2024-07-08 RX ADMIN — LINCOMYCIN HYDROCHLORIDE 600 MG: 300 INJECTION, SOLUTION INTRAMUSCULAR; INTRAVENOUS; SUBCONJUNCTIVAL at 07:07

## 2024-07-08 NOTE — PROGRESS NOTES
Wilfred Menon MD   [facility]  [address] [place] [address]     PATIENT NAME: Quintin Sandoval  : 1965  DATE: 24  MRN: [id number]      Billing Provider: Wilfred Menon MD  Level of Service: CO OFFICE/OUTPT VISIT, EST, LEVL III, 20-29 MIN  Patient PCP Information       Provider PCP Type    Wilfred Menon MD General            Reason for Visit / Chief Complaint: Sinusitis (Cough, congestion, headache x2 weeks )         History of Present Illness / Problem Focused Workflow     Quintin Sandoval presents to the clinic with Sinusitis (Cough, congestion, headache x2 weeks )     HPI    Review of Systems     Review of Systems   Constitutional:  Negative for activity change, appetite change, fatigue and fever.   HENT:  Positive for nasal congestion, rhinorrhea, sinus pressure/congestion and sore throat. Negative for ear pain and hearing loss.    Respiratory:  Positive for cough. Negative for chest tightness and shortness of breath.    Cardiovascular:  Negative for chest pain and palpitations.   Gastrointestinal:  Negative for abdominal pain and fecal incontinence.   Genitourinary:  Negative for bladder incontinence, difficulty urinating and erectile dysfunction.   Musculoskeletal:  Negative for arthralgias.   Integumentary:  Negative for rash.   Neurological:  Negative for dizziness and headaches.        Medical / Social / Family History     Past Medical History:   Diagnosis Date    Hyperlipidemia        Past Surgical History:   Procedure Laterality Date    ANGIOPLASTY      HERNIA REPAIR      REPAIR OF MENISCUS OF KNEE Right     SINUS SURGERY      x 4       Social History  Quintin Sandoval  reports that he has never smoked. He has quit using smokeless tobacco. He reports current alcohol use. He reports that he does not use drugs.    Family History  Quintin Sandoval  family history includes Diabetes in his mother; Heart disease in his mother.    Medications  and Allergies     Medications  Outpatient Medications Marked as Taking for the 7/8/24 encounter (Office Visit) with Wilfred Menon MD   Medication Sig Dispense Refill    aspirin (ECOTRIN) 81 MG EC tablet Take 81 mg by mouth once daily.      atorvastatin (LIPITOR) 80 MG tablet Take 80 mg by mouth once daily.      azelastine (ASTELIN) 137 mcg (0.1 %) nasal spray Nasal for 30      b complex vitamins tablet Take 1 tablet by mouth once daily.      blood sugar diagnostic Strp Check twice daily and as needed for DMII 100 each 2    blood-glucose meter kit Use as instructed 1 each 0    carvediloL (COREG) 6.25 MG tablet Take 6.25 mg by mouth 2 (two) times daily.      dexchlorphen-phenylephrine-DM (POLYTUSSIN DM,DEXCHLORPHENRMN,) 1-5-10 mg/5 mL Syrp Take 10 mLs by mouth every 6 (six) hours as needed (cough). 240 mL 0    ezetimibe (ZETIA) 10 mg tablet Take 10 mg by mouth every morning.      fexofenadine (ALLEGRA) 180 MG tablet Take 180 mg by mouth once daily.      fluticasone propionate (FLONASE) 50 mcg/actuation nasal spray 2 sprays (100 mcg total) by Each Nostril route once daily. 16 g 5    glipiZIDE (GLUCOTROL) 5 MG tablet Take 1 tablet (5 mg total) by mouth 2 (two) times daily before meals. 180 tablet 0    lancets (LANCETS,THIN) Misc Check twice daily and as needed for DMII 100 each 2    lisinopriL (PRINIVIL,ZESTRIL) 2.5 MG tablet Take 2.5 mg by mouth once daily.      montelukast (SINGULAIR) 10 mg tablet TAKE 1 TABLET BY MOUTH EVERY DAY IN THE EVENING 90 tablet 1     Current Facility-Administered Medications for the 7/8/24 encounter (Office Visit) with Wilfred Menon MD   Medication Dose Route Frequency Provider Last Rate Last Admin    dexAMETHasone injection 4 mg  4 mg Intramuscular 1 time in Clinic/HOD Wilfred Menon MD        lincomycin injection 600 mg  600 mg Intramuscular 1 time in Clinic/HOD Wilfred Menon MD        methylPREDNISolone acetate injection 40 mg  40 mg Intramuscular 1 time in  Clinic/HOD Wilfred Menon MD           Allergies  Review of patient's allergies indicates:  No Known Allergies    Physical Examination     Vitals:    07/08/24 0713   BP: 120/88   Pulse: 84   Temp: 97.5 °F (36.4 °C)     Physical Exam  Constitutional:       General: He is not in acute distress.     Appearance: He is not ill-appearing.   HENT:      Head: Normocephalic and atraumatic.      Right Ear: Tympanic membrane and ear canal normal.      Left Ear: Tympanic membrane and ear canal normal.      Nose: Congestion and rhinorrhea present.   Eyes:      Pupils: Pupils are equal, round, and reactive to light.   Cardiovascular:      Rate and Rhythm: Normal rate and regular rhythm.      Pulses: Normal pulses.      Heart sounds: No murmur heard.  Pulmonary:      Effort: No respiratory distress.      Breath sounds: Wheezing and rhonchi present. No rales.   Abdominal:      General: Bowel sounds are normal.      Palpations: Abdomen is soft.      Tenderness: There is no abdominal tenderness.      Hernia: No hernia is present.   Musculoskeletal:      Cervical back: Normal range of motion and neck supple.   Lymphadenopathy:      Cervical: No cervical adenopathy.   Skin:     General: Skin is warm and dry.   Neurological:      Mental Status: He is alert.   Psychiatric:         Behavior: Behavior normal.         Thought Content: Thought content normal.          Assessment and Plan (including Health Maintenance)   :    Plan:         Health Maintenance Due   Topic Date Due    Diabetes Urine Screening  Never done    Pneumococcal Vaccines (Age 0-64) (1 of 2 - PCV) Never done    Foot Exam  Never done    Eye Exam  Never done    HIV Screening  Never done    TETANUS VACCINE  Never done    Shingles Vaccine (1 of 2) Never done    Lipid Panel  05/01/2024       Problem List Items Addressed This Visit    None  Visit Diagnoses       Bronchitis    -  Primary    Acute recurrent maxillary sinusitis              Bronchitis    Acute recurrent  maxillary sinusitis    Other orders  -     dexAMETHasone injection 4 mg  -     methylPREDNISolone acetate injection 40 mg  -     lincomycin injection 600 mg  -     predniSONE (DELTASONE) 10 MG tablet; Take 5 tablets (50 mg total) by mouth once daily for 2 days, THEN 4 tablets (40 mg total) once daily for 2 days, THEN 3 tablets (30 mg total) once daily for 2 days, THEN 2 tablets (20 mg total) once daily for 2 days, THEN 1 tablet (10 mg total) once daily for 2 days.  Dispense: 30 tablet; Refill: 0       Health Maintenance Topics with due status: Not Due       Topic Last Completion Date    Colorectal Cancer Screening 01/28/2021    Influenza Vaccine 09/29/2022    Hemoglobin A1c 06/27/2024       Procedures     Future Appointments   Date Time Provider Department Center   7/9/2024 11:00 AM David Cheney PTA [facility] [place]   7/11/2024  9:30 AM David Cheney PTA [facility] [place]   7/16/2024  9:30 AM Francisco Bell, PT [facility] [place]   7/18/2024  9:30 AM Reinaldo Parikh, PT [facility] [place]   7/23/2024  8:00 AM Saritha White PTA [facility] [place]   7/25/2024  8:00 AM Saritha White PTA [facility] [place]   7/30/2024  8:00 AM Francisco Bell, PT [facility] [place]   8/1/2024  8:00 AM Francisco Bell, PT [facility] [place]   10/1/2024  8:00 AM Wilfred Menon MD [facility]        No follow-ups on file.       Signature:  Wilfred Menon MD  [facility]  [address] [address] [address]  [phone] Phone  [phone] Fax    Date of encounter: 7/8/24

## 2024-07-09 ENCOUNTER — CLINICAL SUPPORT (OUTPATIENT)
Dept: REHABILITATION | Facility: HOSPITAL | Age: 59
End: 2024-07-09
Payer: COMMERCIAL

## 2024-07-09 DIAGNOSIS — M17.9 OSTEOARTHRITIS OF KNEE, UNSPECIFIED: Primary | ICD-10-CM

## 2024-07-09 PROCEDURE — 97530 THERAPEUTIC ACTIVITIES: CPT | Mod: CQ

## 2024-07-09 PROCEDURE — 97110 THERAPEUTIC EXERCISES: CPT | Mod: CQ

## 2024-07-09 NOTE — PROGRESS NOTES
"[facility]   Physical Therapy Treatment Note      Name: Quintin Sandoval  Clinic Number: [id number]    Therapy Diagnosis:        Encounter Diagnoses   Name Primary?    Osteoarthritis of knee, unspecified      Other specified postprocedural states          Physician: Jairo Gilmore MD     Physician Orders: PT Eval and Treat   Medical Diagnosis from Referral: R knee osteoarthritis   Evaluation Date: 5/22/2024  Authorization Period Expiration: 5/21/2025  Plan of Care Expiration: 8/9/2024  Progress Note Due: 8/9/2024  Date of Surgery: 5/20/2024  Visit # / Visits authorized: 19/30  FOTO: to be completed on visit 18     Precautions: Standard      Time In: 11:00  Time Out: 11:43  Total Billable Time: 43 minutes     Visit Date: 7/9/2024      PTA Visit #: 1/5  Subjective     Patient reports:  "I have bronchitis so I haven't been able to take very deep breaths".       He was compliant with home exercise program.  Response to previous treatment: No adverse effects   Functional change: No longer using assistive device     Pain: 3/10  Location: right knee      Objective      Objective Measures updated at progress report unless specified.     Treatment     Leno received the treatments listed below:      Therapeutic exercises to develop strength, endurance, ROM, and flexibility for 25 minutes including: see flowsheet below     Therapeutic activities to improve functional performance for 18 minutes including: see flowsheet below   *indicates increases in reps or resistance during this treatment session    Ther- Ex Reps   Nustep/Bike 8 minutes    Wedge 2 minutes    Hamstring Stretch 3 x 20"    Heelslides seated  30 x 3"     LAQ's 3 x 10, 2.5#    Hamstring Curls 2 x 10 green TB    Straight Leg Raises 20 x, 2.5#    Sidelying Hip ABD  2 x 10, 2.5#    Heelslides supine  30 x (110 degrees)    Sidelying hip ADD  2 x 10 2.5#           Ther-Act    Heel Raises 2 x 10 low step    Ball Squats 2 x 10    Sit <> " stands  10 x 2   Forward Step ups 2 x 10 high step   Lateral Step ups 2 x 10 high step    Standing R hip and knee flexion   2 x 10  red TB    3 way lunge step  8 x                   Not completed:  Cold Pack: applied to R knee with elevation above heart level for 8 minutes   Patient Education and Home Exercises       Education provided:   - Plan of Care, Home exercise program, Delayed onset muscle soreness     Written Home Exercises Provided: Patient instructed to cont prior HEP. Exercises were reviewed and Leno was able to demonstrate them prior to the end of the session.  Leno demonstrated good  understanding of the education provided. See Electronic Medical Record under Patient Instructions for exercises provided during therapy sessions    Assessment   Quintin is a 58 y.o. male referred to outpatient Physical Therapy with a medical diagnosis of R TKA. Patient presents with R knee pain, decreased R knee ROM, impaired R lower extremity muscle strength and motor control, and edema. Patient's impairments are limiting his independence and activity tolerance with mobility tasks. Pt progressed through tx with improved carryover of form and motor control. Pt displays improved strength and endurance with minimal rest breaks required. Pt reports decreased nerve pain     Leno Is progressing well towards his goals.   Patient prognosis is Good.     Patient will continue to benefit from skilled outpatient physical therapy to address the deficits listed in the problem list box on initial evaluation, provide pt/family education and to maximize pt's level of independence in the home and community environment.     Patient's spiritual, cultural and educational needs considered and pt agreeable to plan of care and goals.     Anticipated barriers to physical therapy: N/A    Goals:   Short Term Goals: 3 weeks   Patient will independently complete Home exercise program with correct form.   Patient will report decreased pain to less than or  equal to 3/10 for improved quality of life.   Patient will independently transfer sit to stand with no UE use for increased independence with community transfers.      Long Term Goals: 6 weeks   Pt will increase R knee flexion to 120, knee extension to 0 degrees, and quad lag to 0 degrees to allow patient normal gait pattern.  Pt will increase R knee strength to 4/5 to allow patient to safely return to prior level of function.  Pt will report decrease in pain to less than or equal to 2/10 to improve quality of life.  Pt will complete 5 x sit<> less than or equal to 15 seconds indicating increased LE power.   Pt will negotiate up and down stairs with a reciprocal pattern for return to stair climbing at work.   Pt will ambulate with gait speed greater than or equal to 3.25 feet per second.     Plan   Plan of care Certification: 5/22/2024 to 8/9/2024.  Outpatient Physical Therapy 2 times weekly for 6 weeks to include the following interventions: Electrical Stimulation unattended, Gait Training, Manual Therapy, Moist Heat/ Ice, Neuromuscular Re-ed, Patient Education, Therapeutic Activities, and Therapeutic Exercise.      Continue Plan of Care per PT order to progress patient toward rehab goals as tolerated by patient.   STAN Alvarado   7/9/2024

## 2024-07-12 ENCOUNTER — CLINICAL SUPPORT (OUTPATIENT)
Dept: REHABILITATION | Facility: HOSPITAL | Age: 59
End: 2024-07-12
Payer: COMMERCIAL

## 2024-07-12 DIAGNOSIS — M17.9 OSTEOARTHRITIS OF KNEE, UNSPECIFIED: Primary | ICD-10-CM

## 2024-07-12 PROCEDURE — 97530 THERAPEUTIC ACTIVITIES: CPT

## 2024-07-12 PROCEDURE — 97110 THERAPEUTIC EXERCISES: CPT

## 2024-07-16 ENCOUNTER — CLINICAL SUPPORT (OUTPATIENT)
Dept: REHABILITATION | Facility: HOSPITAL | Age: 59
End: 2024-07-16
Payer: COMMERCIAL

## 2024-07-16 DIAGNOSIS — M17.9 OSTEOARTHRITIS OF KNEE, UNSPECIFIED: Primary | ICD-10-CM

## 2024-07-16 PROCEDURE — 97110 THERAPEUTIC EXERCISES: CPT

## 2024-07-16 PROCEDURE — 97530 THERAPEUTIC ACTIVITIES: CPT

## 2024-07-16 NOTE — PROGRESS NOTES
"[facility]   Physical Therapy Treatment Note      Name: Quintin Sandoval  Clinic Number: [id number]    Therapy Diagnosis:        Encounter Diagnoses   Name Primary?    Osteoarthritis of knee, unspecified      Other specified postprocedural states          Physician: Jairo Gilmore MD     Physician Orders: PT Eval and Treat   Medical Diagnosis from Referral: R knee osteoarthritis   Evaluation Date: 5/22/2024  Authorization Period Expiration: 5/21/2025  Plan of Care Expiration: 8/9/2024  Progress Note Due: 8/9/2024  Date of Surgery: 5/20/2024  Visit # / Visits authorized: 21/30  FOTO: to be completed on visit 24     Precautions: Standard      Time In: 9:25  Time Out: 10:15  Total Billable Time:  50 minutes      Visit Date: 7/16/2024      PTA Visit #: 0/5  Subjective     Patient reports:  "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 I did pretty good though."       He was compliant with home exercise program.  Response to previous treatment: No adverse effects   Functional change: Increased activity tolerance without pain      Pain: 0/10  Location: right knee      Objective      Objective Measures updated at progress report unless specified.     Treatment     Leno received the treatments listed below:      Therapeutic exercises to develop strength, endurance, ROM, and flexibility for 24 minutes including: see flowsheet below     Therapeutic activities to improve functional performance for 26 minutes including: see flowsheet below   *indicates increases in reps or resistance during this treatment session    Ther- Ex Reps   Nustep/Bike 8 minutes    Wedge 2 minutes    Hamstring Stretch 3 x 20"    LAQ's 3 x 10, green TB *   Hamstring Curls 3 x 10 green TB *   4 way hip  20 x, 2.5#    Heelslides supine  30 x (18 degrees)        Ther-Act    Heel Raises 2 x 10 low step    Ball Squats 3 x 10    Sit <> stands  10 x 3   Forward Step ups 2 x 10 high step   Lateral Step ups 2 " x 10 high step    Standing R hip and knee flexion   2 x 10  red TB    3 way lunge step  8 x    Monster Walks  4 x red TB              Patient Education and Home Exercises       Education provided:   - Plan of Care, Home exercise program, Delayed onset muscle soreness     Written Home Exercises Provided: Patient instructed to cont prior HEP. Exercises were reviewed and Leno was able to demonstrate them prior to the end of the session.  Leno demonstrated good  understanding of the education provided. See Electronic Medical Record under Patient Instructions for exercises provided during therapy sessions    Assessment   Quintin is a 58 y.o. male referred to outpatient Physical Therapy with a medical diagnosis of R TKA. Patient presents with R knee pain, decreased R knee ROM, impaired R lower extremity muscle strength and motor control, and edema. Patient's impairments are limiting his independence and activity tolerance with mobility tasks. Patient demonstrates good carryover exercise instruction from previous treatment session. Patient required occasional cues for increased R LE motor control with standing tasks. Patient demonstrated increased R knee flexion today and improved quad control with 4 way hip exercises. No adverse effects noted to treatment tasks.     Leno Is progressing well towards his goals.   Patient prognosis is Good.     Patient will continue to benefit from skilled outpatient physical therapy to address the deficits listed in the problem list box on initial evaluation, provide pt/family education and to maximize pt's level of independence in the home and community environment.     Patient's spiritual, cultural and educational needs considered and pt agreeable to plan of care and goals.     Anticipated barriers to physical therapy: N/A    Goals:   Short Term Goals: 3 weeks   Patient will independently complete Home exercise program with correct form.   Patient will report decreased pain to less than or  equal to 3/10 for improved quality of life.   Patient will independently transfer sit to stand with no UE use for increased independence with community transfers.      Long Term Goals: 6 weeks   Pt will increase R knee flexion to 120, knee extension to 0 degrees, and quad lag to 0 degrees to allow patient normal gait pattern.  Pt will increase R knee strength to 4/5 to allow patient to safely return to prior level of function.  Pt will report decrease in pain to less than or equal to 2/10 to improve quality of life.  Pt will complete 5 x sit<> less than or equal to 15 seconds indicating increased LE power.   Pt will negotiate up and down stairs with a reciprocal pattern for return to stair climbing at work.   Pt will ambulate with gait speed greater than or equal to 3.25 feet per second.     Plan   Plan of care Certification: 5/22/2024 to 8/9/2024.  Outpatient Physical Therapy 2 times weekly for 6 weeks to include the following interventions: Electrical Stimulation unattended, Gait Training, Manual Therapy, Moist Heat/ Ice, Neuromuscular Re-ed, Patient Education, Therapeutic Activities, and Therapeutic Exercise.      Continue Plan of Care per PT order to progress patient toward rehab goals as tolerated by patient.   Francisco Bell, PT, DPT     7/16/2024

## 2024-07-18 ENCOUNTER — CLINICAL SUPPORT (OUTPATIENT)
Dept: REHABILITATION | Facility: HOSPITAL | Age: 59
End: 2024-07-18
Payer: COMMERCIAL

## 2024-07-18 DIAGNOSIS — M25.561 CHRONIC PAIN OF RIGHT KNEE: Primary | ICD-10-CM

## 2024-07-18 DIAGNOSIS — G89.29 CHRONIC PAIN OF RIGHT KNEE: Primary | ICD-10-CM

## 2024-07-18 PROCEDURE — 97530 THERAPEUTIC ACTIVITIES: CPT

## 2024-07-18 PROCEDURE — 97110 THERAPEUTIC EXERCISES: CPT

## 2024-07-18 PROCEDURE — 97112 NEUROMUSCULAR REEDUCATION: CPT

## 2024-07-18 NOTE — PROGRESS NOTES
[facility]   Physical Therapy Treatment Note      Name: Quintin Sandoval  Clinic Number: [id number]    Therapy Diagnosis:        Encounter Diagnoses   Name Primary?    Osteoarthritis of knee, unspecified      Other specified postprocedural states          Physician: Jairo Gilmore MD     Physician Orders: PT Eval and Treat   Medical Diagnosis from Referral: R knee osteoarthritis   Evaluation Date: 5/22/2024  Authorization Period Expiration: 5/21/2025  Plan of Care Expiration: 8/9/2024  Progress Note Due: 8/9/2024  Date of Surgery: 5/20/2024  Visit # / Visits authorized: 22/30  FOTO: to be completed on visit 24     Precautions: Standard      Time In: 0931  Time Out: 1016  Total Billable Time: 45 minutes      Visit Date: 7/18/2024      PTA Visit #: 0/5  Subjective     Patient reports:  no pain, just soreness/stiffness in the knee. No complaints out of knee at this time, and denies issues with home activities when it comes to his knees. Returns to surgeon next Thursday (07/25/2024).     He was compliant with home exercise program.  Response to previous treatment: No adverse effects   Functional change: Increased activity tolerance without pain      Pain: 0/10  Location: right knee      Objective      Objective Measures updated at progress report unless specified.     Treatment     Leno received the treatments listed below:      Therapeutic exercises to develop strength, endurance, ROM, and flexibility for 24 minutes including: see flowsheet below     Therapeutic activities to improve functional performance for 26 minutes including: see flowsheet below   *indicates increases in reps or resistance during this treatment session    Ther- Ex (24 min) Performed today Reps   Nustep/Bike [x]Yes    []No 8 minutes    Wedge [x]Yes    []No 3 x 30 sec   Hamstring Stretch [x]Yes    []No 3 x 30 sec   LAQ's []Yes    []No 3 x 10, green TB *   Hamstring Curls []Yes    []No 3 x 10 green TB *   4 way hip   []Yes    []No 20 x, 2.5#    Heelslides supine  []Yes    []No 30 x (18 degrees)         Neuro re-ed (10 min)     Lateral step downs, cuing for controlled eccentrics [x]Yes    []No 2 x 10, 6-inch step, each LE   Forward step ups, cuing for controlled step up as opposed to springing up with bottom foot [x]Yes    []No BLE, 6-inch step, 20-lb dumbbell         Ther-Act (13 min)     Heel Raises []Yes    []No 2 x 10 low step    Ball Squats [x]Yes    []No 3 x 10    Sit <> stands, standard height chair, touch and go [x]Yes    []No 2 x 10    Standing R hip and knee flexion  []Yes    []No 2 x 10  red TB    3 way lunge step  []Yes    []No 8 x    Monster Walks  []Yes    []No 4 x red TB    Lunges, static; at stairs with handrail and back of chair for support as needed [x]Yes    []No 15 each LE          Patient Education and Home Exercises       Education provided:   - Plan of Care, Home exercise program, Delayed onset muscle soreness     Written Home Exercises Provided: Patient instructed to cont prior HEP. Exercises were reviewed and Leno was able to demonstrate them prior to the end of the session.  Leno demonstrated good  understanding of the education provided. See Electronic Medical Record under Patient Instructions for exercises provided during therapy sessions    Assessment   Pt with R knee 0 degrees resting ext, 0 degrees active ext, and no lag with SLR. Flex to 100 degrees in longsitting. Continued with a lot of functional strengthening to the R and L knee today. Pt denies pain with treatment today but does demonstrate appropriate fatigue during session. Pt has responded well to PT as evidenced by the strength and ROM in his R knee at this time. We will continue to work toward functional strengthening to pt's tolerance and per surgeon's instructions. Will progress as able.     Leno Is progressing well towards his goals.   Patient prognosis is Good.     Patient will continue to benefit from skilled outpatient physical therapy  to address the deficits listed in the problem list box on initial evaluation, provide pt/family education and to maximize pt's level of independence in the home and community environment.     Patient's spiritual, cultural and educational needs considered and pt agreeable to plan of care and goals.     Anticipated barriers to physical therapy: N/A    Goals:   Short Term Goals: 3 weeks   Patient will independently complete Home exercise program with correct form.   Patient will report decreased pain to less than or equal to 3/10 for improved quality of life.   Patient will independently transfer sit to stand with no UE use for increased independence with community transfers.      Long Term Goals: 6 weeks   Pt will increase R knee flexion to 120, knee extension to 0 degrees, and quad lag to 0 degrees to allow patient normal gait pattern.  Pt will increase R knee strength to 4/5 to allow patient to safely return to prior level of function.  Pt will report decrease in pain to less than or equal to 2/10 to improve quality of life.  Pt will complete 5 x sit<> less than or equal to 15 seconds indicating increased LE power.   Pt will negotiate up and down stairs with a reciprocal pattern for return to stair climbing at work.   Pt will ambulate with gait speed greater than or equal to 3.25 feet per second.     Plan   Plan of care Certification: 5/22/2024 to 8/9/2024.  Outpatient Physical Therapy 2 times weekly for 6 weeks to include the following interventions: Electrical Stimulation unattended, Gait Training, Manual Therapy, Moist Heat/ Ice, Neuromuscular Re-ed, Patient Education, Therapeutic Activities, and Therapeutic Exercise.      Continue Plan of Care per PT order to progress patient toward rehab goals as tolerated by patient.     Reinaldo Parikh, PT, DPT     7/18/2024

## 2024-07-23 ENCOUNTER — CLINICAL SUPPORT (OUTPATIENT)
Dept: REHABILITATION | Facility: HOSPITAL | Age: 59
End: 2024-07-23
Payer: COMMERCIAL

## 2024-07-23 DIAGNOSIS — M17.9 OSTEOARTHRITIS OF KNEE, UNSPECIFIED: Primary | ICD-10-CM

## 2024-07-23 PROCEDURE — 97110 THERAPEUTIC EXERCISES: CPT | Mod: CQ

## 2024-07-23 PROCEDURE — 97530 THERAPEUTIC ACTIVITIES: CPT | Mod: CQ

## 2024-07-23 NOTE — PROGRESS NOTES
[facility]   Physical Therapy Treatment Note      Name: Quintin Sandoval  Clinic Number: [id number]    Therapy Diagnosis:        Encounter Diagnoses   Name Primary?    Osteoarthritis of knee, unspecified      Other specified postprocedural states          Physician: Jairo Gilmore MD     Physician Orders: PT Eval and Treat   Medical Diagnosis from Referral: R knee osteoarthritis   Evaluation Date: 5/22/2024  Authorization Period Expiration: 5/21/2025  Plan of Care Expiration: 8/9/2024  Progress Note Due: 8/9/2024  Date of Surgery: 5/20/2024  Visit # / Visits authorized: 23/30  FOTO: to be completed on visit 24     Precautions: Standard      Time In: 8:02  Time Out: 8:45  Total Billable Time: 43 minutes     Visit Date: 7/23/2024      PTA Visit #: 1/4  Subjective     Patient reports:  Increased soreness reported as Delayed onset muscle soreness after previous PT treatment session, but states is better today.       He was compliant with home exercise program.  Response to previous treatment: No adverse effects   Functional change: Increased activity tolerance without pain      Pain: 0/10  Location: right knee      Objective      Objective Measures updated at progress report unless specified.     Treatment     Leno received the treatments listed below:      Therapeutic exercises to develop strength, endurance, ROM, and flexibility for 20 minutes including: see flowsheet below     Therapeutic activities to improve functional performance for 23 minutes including: see flowsheet below   *indicates increases in reps or resistance during this treatment session    Ther- Ex (24 min) Performed today Reps   Nustep/Bike [x]Yes    []No 8 minutes    Wedge [x]Yes    []No 3 x 30 sec   Hamstring Stretch [x]Yes    []No 3 x 30 sec   LAQ's []Yes    []No 3 x 10, green TB *   Hamstring Curls []Yes    []No 3 x 10 green TB *   4 way hip  []Yes    []No 20 x, 2.5#    Heelslides supine  []Yes    []No 30 x (18  degrees)         Neuro re-ed (10 min)     Lateral step downs, cuing for controlled eccentrics [x]Yes    []No 2 x 10, 6-inch step, each LE   Forward step ups, cuing for controlled step up as opposed to springing up with bottom foot [x]Yes    []No BLE, 6-inch step, 20-lb dumbbell         Ther-Act (13 min)     Heel Raises [x]Yes    []No 2 x 10, 20 # *    Squats [x]Yes    []No 2 x 10, 20# *   Sit <> stands, standard height chair, touch and go [x]Yes    []No 2 x 10    Standing R hip and knee flexion  []Yes    []No 2 x 10  red TB    3 way lunge step  []Yes    []No 8 x    Monster Walks  []Yes    []No 4 x red TB    Lunges, static; at stairs with handrail and back of chair for support as needed [x]Yes    []No 15 each LE          Patient Education and Home Exercises       Education provided:   - Plan of Care, Home exercise program, Delayed onset muscle soreness     Written Home Exercises Provided: Patient instructed to cont prior HEP. Exercises were reviewed and Leno was able to demonstrate them prior to the end of the session.  Leno demonstrated good  understanding of the education provided. See Electronic Medical Record under Patient Instructions for exercises provided during therapy sessions    Assessment   Increased resistance of therapeutic activities as tolerated by patient.  Patient demonstrates improved functional mobility and is progressing toward rehab goals.       Leno Is progressing well towards his goals.   Patient prognosis is Good.     Patient will continue to benefit from skilled outpatient physical therapy to address the deficits listed in the problem list box on initial evaluation, provide pt/family education and to maximize pt's level of independence in the home and community environment.     Patient's spiritual, cultural and educational needs considered and pt agreeable to plan of care and goals.     Anticipated barriers to physical therapy: N/A    Goals:   Short Term Goals: 3 weeks   Patient will  independently complete Home exercise program with correct form.   Patient will report decreased pain to less than or equal to 3/10 for improved quality of life.   Patient will independently transfer sit to stand with no UE use for increased independence with community transfers.      Long Term Goals: 6 weeks   Pt will increase R knee flexion to 120, knee extension to 0 degrees, and quad lag to 0 degrees to allow patient normal gait pattern.  Pt will increase R knee strength to 4/5 to allow patient to safely return to prior level of function.  Pt will report decrease in pain to less than or equal to 2/10 to improve quality of life.  Pt will complete 5 x sit<> less than or equal to 15 seconds indicating increased LE power.   Pt will negotiate up and down stairs with a reciprocal pattern for return to stair climbing at work.   Pt will ambulate with gait speed greater than or equal to 3.25 feet per second.     Plan   Plan of care Certification: 5/22/2024 to 8/9/2024.  Outpatient Physical Therapy 2 times weekly for 6 weeks to include the following interventions: Electrical Stimulation unattended, Gait Training, Manual Therapy, Moist Heat/ Ice, Neuromuscular Re-ed, Patient Education, Therapeutic Activities, and Therapeutic Exercise.      Continue Plan of Care per PT order to progress patient toward rehab goals as tolerated by patient.     STAN Reyna     7/23/2024

## 2024-07-24 ENCOUNTER — CLINICAL SUPPORT (OUTPATIENT)
Dept: REHABILITATION | Facility: HOSPITAL | Age: 59
End: 2024-07-24
Payer: COMMERCIAL

## 2024-07-24 DIAGNOSIS — M17.9 OSTEOARTHRITIS OF KNEE, UNSPECIFIED: Primary | ICD-10-CM

## 2024-07-24 DIAGNOSIS — G89.29 CHRONIC PAIN OF RIGHT KNEE: ICD-10-CM

## 2024-07-24 DIAGNOSIS — M25.561 CHRONIC PAIN OF RIGHT KNEE: ICD-10-CM

## 2024-07-24 PROCEDURE — 97110 THERAPEUTIC EXERCISES: CPT | Mod: CQ

## 2024-07-24 PROCEDURE — 97530 THERAPEUTIC ACTIVITIES: CPT | Mod: CQ

## 2024-07-24 NOTE — PROGRESS NOTES
[facility]   Physical Therapy Treatment Note      Name: Quintin Sandoval  Clinic Number: [id number]    Therapy Diagnosis:        Encounter Diagnoses   Name Primary?    Osteoarthritis of knee, unspecified      Other specified postprocedural states          Physician: Jairo Gilmore MD     Physician Orders: PT Eval and Treat   Medical Diagnosis from Referral: R knee osteoarthritis   Evaluation Date: 5/22/2024  Authorization Period Expiration: 5/21/2025  Plan of Care Expiration: 8/9/2024  Progress Note Due: 8/9/2024  Date of Surgery: 5/20/2024  Visit # / Visits authorized: 24/30  FOTO: to be completed on visit 25     Precautions: Standard      Time In: 0847  Time Out: 0930  Total Billable Time: 43 minutes     Visit Date: 7/24/2024    PTA Visit #: 2/4    Received Plan of Care per Elisabeth Bell PT     Subjective     Patient reports:  was sore over the weekend after driving a lot; better today  He was compliant with home exercise program.  Response to previous treatment: some soreness   Functional change: Increased activity tolerance without pain      Pain: 0/10  Location: right knee      Objective      Sit to stand without BUE support x 5: 11.26 seconds    Treatment     Leno received the treatments listed below:      Therapeutic exercises to develop strength, endurance, ROM, and flexibility for 20 minutes including: see flowsheet below     Therapeutic activities to improve functional performance for 13 minutes including: see flowsheet below   *indicates increases in reps or resistance during this treatment session    Neuromuscular reeducation x 10 minutes (see flow sheet)    Ther- Ex (20 min) Performed today Reps   Nustep/Bike [x]Yes    []No 8 minutes    Wedge [x]Yes    []No 3 x 30 sec   Hamstring Stretch [x]Yes    []No 3 x 30 sec   LAQ's []Yes    []No 3 x 10, green TB *   Hamstring Curls []Yes    []No 3 x 10 green TB *   4 way hip  [x]Yes    []No 10x each, bilaterally, 17#   Heelslides  supine  []Yes    []No 30 x (18 degrees)    Knee flexion stretching 2-3 x 20 s/h Throughout session   Neuro re-ed (10 min)     Lateral step downs, cuing for controlled eccentrics [x]Yes    []No 2 x 10, 6-inch step, each LE   Forward step ups, cuing for controlled step up as opposed to springing up with bottom foot [x]Yes    []No BLE, 6-inch step, 20-lb dumbbell         Ther-Act (13 min)     Heel Raises [x]Yes    []No 2 x 10, 20 # *    Squats [x]Yes    []No 2 x 10, 20# *   Sit <> stands, standard height chair, touch and go [x]Yes    []No 2 x 10    Standing R hip and knee flexion  []Yes    []No 2 x 10  red TB    3 way lunge step  []Yes    []No 8 x    Monster Walks  []Yes    []No 4 x red TB    Lunges, static; at stairs with handrail and back of chair for support as needed [x]Yes    []No 15 each LE          Patient Education and Home Exercises       Education provided:   - continue current home exercise program, pain free    Written Home Exercises Provided: Patient instructed to cont prior HEP. Exercises were reviewed and Leno was able to demonstrate them prior to the end of the session.  Leno demonstrated good  understanding of the education provided. See Electronic Medical Record under Patient Instructions for exercises provided during therapy sessions    Assessment     75% of goals met; pt is improving well with strength, endurance, neuromuscular reeducation and range of motion  Leno Is progressing well towards his goals.   Patient prognosis is Good.     Patient will continue to benefit from skilled outpatient physical therapy to address the deficits listed in the problem list box on initial evaluation, provide pt/family education and to maximize pt's level of independence in the home and community environment.      Anticipated barriers to physical therapy: N/A    Goals:   Short Term Goals: 3 weeks   Patient will independently complete Home exercise program with correct form.  met  Patient will report decreased pain to  less than or equal to 3/10 for improved quality of life. met  Patient will independently transfer sit to stand with no UE use for increased independence with community transfers. met     Long Term Goals: 6 weeks   Pt will increase R knee flexion to 120, knee extension to 0 degrees, and quad lag to 0 degrees to allow patient normal gait pattern.  Pt will increase R knee strength to 4/5 to allow patient to safely return to prior level of function.   Pt will report decrease in pain to less than or equal to 2/10 to improve quality of life. met  Pt will complete 5 x sit<> less than or equal to 15 seconds indicating increased LE power. met  Pt will negotiate up and down stairs with a reciprocal pattern for return to stair climbing at work. met  Pt will ambulate with gait speed greater than or equal to 3.25 feet per second.     Plan     Plan of care Certification: 5/22/2024 to 8/9/2024.    Outpatient Physical Therapy 2 times weekly for 6 weeks to include the following interventions: Electrical Stimulation unattended, Gait Training, Manual Therapy, Moist Heat/ Ice, Neuromuscular Re-ed, Patient Education, Therapeutic Activities, and Therapeutic Exercise.      Continue per Plan of Care and progress as pt   7/24/2024

## 2024-07-30 ENCOUNTER — CLINICAL SUPPORT (OUTPATIENT)
Dept: REHABILITATION | Facility: HOSPITAL | Age: 59
End: 2024-07-30
Payer: COMMERCIAL

## 2024-07-30 DIAGNOSIS — M17.9 OSTEOARTHRITIS OF KNEE, UNSPECIFIED: Primary | ICD-10-CM

## 2024-07-30 PROCEDURE — 97110 THERAPEUTIC EXERCISES: CPT

## 2024-07-30 PROCEDURE — 97112 NEUROMUSCULAR REEDUCATION: CPT

## 2024-07-30 PROCEDURE — 97530 THERAPEUTIC ACTIVITIES: CPT

## 2024-07-30 NOTE — PROGRESS NOTES
"[facility]   Physical Therapy Treatment Note      Name: Quintin Sandoval  Clinic Number: [id number]    Therapy Diagnosis:        Encounter Diagnoses   Name Primary?    Osteoarthritis of knee, unspecified      Other specified postprocedural states          Physician: Jairo Gilmore MD     Physician Orders: PT Eval and Treat   Medical Diagnosis from Referral: R knee osteoarthritis   Evaluation Date: 5/22/2024  Authorization Period Expiration: 5/21/2025  Plan of Care Expiration: 8/9/2024  Progress Note Due: 8/9/2024  Date of Surgery: 5/20/2024  Visit # / Visits authorized: 25/30  FOTO: to be completed on visit 25     Precautions: Standard      Time In: 8:04  Time Out: 8:50  Total Billable Time: 46 minutes     Visit Date: 7/30/2024    PTA Visit #: 0/4    Subjective     Patient reports: "My doctor was very pleased with my knee and my progress. He did put me off on going back to work for another six weeks."   He was compliant with home exercise program.  Response to previous treatment: some soreness   Functional change: Increased activity tolerance without pain      Pain: 0/10  Location: right knee      Objective      Objective measures to be assessed at next visit    Treatment     Leno received the treatments listed below:      Therapeutic exercises to develop strength, endurance, ROM, and flexibility for 22 minutes including: see flowsheet below     Therapeutic activities to improve functional performance for 14 minutes including: see flowsheet below   *indicates increases in reps or resistance during this treatment session    Neuromuscular reeducation x 10 minutes (see flow sheet)    Ther- Ex (22 min) Performed today Reps   Nustep/Bike [x]Yes    []No 8 minutes    Wedge [x]Yes    []No 3 x 30 sec   Hamstring Stretch [x]Yes    []No 3 x 30 sec   LAQ's [x]Yes    []No 3 x 10, green TB *   Hamstring Curls  [x]Yes    []No 2 x 10 blue TB*   4 way hip  [x]Yes    []No 15 x each 3#   Heelslides " supine  []Yes    [x]No 30 x        Neuro re-ed (10 min)     Lateral step downs, cuing for controlled eccentrics [x]Yes    []No 2 x 10, 6-inch step, each LE   Forward step ups, cuing for controlled step up as opposed to springing up with bottom foot [x]Yes    []No BLE, 6-inch step, 20-lb dumbbell    Forward step downs  [x]Yes    []No 6 inch step 2 x 10    Ther-Act (14 min)     Heel Raises []Yes    [x]No 2 x 10, 20 #    Squats [x]Yes    []No 2 x 10, 20#    Sit <> stands, standard height chair, touch and go [x]Yes    []No 2 x 10 green TB*   3 way lunge step  [x]Yes    []No 8 x    Monster Walks  [x]Yes    []No 4 x red TB    Lunges, static; at stairs with handrail and back of chair for support as needed [x]Yes    []No 15 each LE          Patient Education and Home Exercises       Education provided:   - continue current home exercise program, pain free    Written Home Exercises Provided: Patient instructed to cont prior HEP. Exercises were reviewed and Leno was able to demonstrate them prior to the end of the session.  Leno demonstrated good  understanding of the education provided. See Electronic Medical Record under Patient Instructions for exercises provided during therapy sessions    Assessment   Patient demonstrates good carryover of exercise instruction from previous treatments. He demonstrates increased R LE motor control and endurance with standing tasks requiring less breaks and cueing. Patient had no reports of adverse effects to treatment tasks noted. Patient is progressing well toward his goals.   Patient prognosis is Good.     Patient will continue to benefit from skilled outpatient physical therapy to address the deficits listed in the problem list box on initial evaluation, provide pt/family education and to maximize pt's level of independence in the home and community environment.      Anticipated barriers to physical therapy: N/A    Goals:   Short Term Goals: 3 weeks   Patient will independently complete  Home exercise program with correct form.  met  Patient will report decreased pain to less than or equal to 3/10 for improved quality of life. met  Patient will independently transfer sit to stand with no UE use for increased independence with community transfers. met     Long Term Goals: 6 weeks   Pt will increase R knee flexion to 120, knee extension to 0 degrees, and quad lag to 0 degrees to allow patient normal gait pattern.  Pt will increase R knee strength to 4/5 to allow patient to safely return to prior level of function.   Pt will report decrease in pain to less than or equal to 2/10 to improve quality of life. met  Pt will complete 5 x sit<> less than or equal to 15 seconds indicating increased LE power. met  Pt will negotiate up and down stairs with a reciprocal pattern for return to stair climbing at work. met  Pt will ambulate with gait speed greater than or equal to 3.25 feet per second.     Plan     Plan of care Certification: 5/22/2024 to 8/9/2024.    Outpatient Physical Therapy 2 times weekly for 6 weeks to include the following interventions: Electrical Stimulation unattended, Gait Training, Manual Therapy, Moist Heat/ Ice, Neuromuscular Re-ed, Patient Education, Therapeutic Activities, and Therapeutic Exercise.      Continue per Plan of Care and progress as pt.  Francisco Bell, PT, DPT     7/30/2024

## 2024-08-01 ENCOUNTER — CLINICAL SUPPORT (OUTPATIENT)
Dept: REHABILITATION | Facility: HOSPITAL | Age: 59
End: 2024-08-01
Payer: COMMERCIAL

## 2024-08-01 DIAGNOSIS — M17.9 OSTEOARTHRITIS OF KNEE, UNSPECIFIED: Primary | ICD-10-CM

## 2024-08-01 PROCEDURE — 97112 NEUROMUSCULAR REEDUCATION: CPT

## 2024-08-01 PROCEDURE — 97110 THERAPEUTIC EXERCISES: CPT

## 2024-08-01 PROCEDURE — 97530 THERAPEUTIC ACTIVITIES: CPT

## 2024-08-01 NOTE — PROGRESS NOTES
"[facility]   Physical Therapy Treatment Note      Name: Quintin Sandoval  Clinic Number: [id number]    Therapy Diagnosis:        Encounter Diagnoses   Name Primary?    Osteoarthritis of knee, unspecified      Other specified postprocedural states          Physician: Jairo Gilmore MD     Physician Orders: PT Eval and Treat   Medical Diagnosis from Referral: R knee osteoarthritis   Evaluation Date: 5/22/2024  Authorization Period Expiration: 5/21/2025  Plan of Care Expiration: 8/9/2024  Progress Note Due: 8/9/2024  Date of Surgery: 5/20/2024  Visit # / Visits authorized: 26/30  FOTO: to be completed on visit 30     Precautions: Standard      Time In: 8:00  Time Out: 8:50  Total Billable Time: 50  minutes     Visit Date: 8/1/2024    PTA Visit #: 0/5    Subjective     Patient reports: "My doctor was very pleased with my knee and my progress. He did put me off on going back to work for another six weeks."   He was compliant with home exercise program.  Response to previous treatment: some soreness   Functional change: Increased activity tolerance without pain      Pain: 0/10  Location: right knee      Objective      Objective measures to be assessed at next visit    Treatment     Leno received the treatments listed below:      Therapeutic exercises to develop strength, endurance, ROM, and flexibility for 23 minutes including: see flowsheet below     Therapeutic activities to improve functional performance for 14 minutes including: see flowsheet below   *indicates increases in reps or resistance during this treatment session    Neuromuscular reeducation x 13 minutes (see flow sheet)    Ther- Ex (23 min) Performed today Reps   Nustep/Bike [x]Yes    []No 6 minutes hills   Wedge [x]Yes    []No 2 minutes    Hamstring Stretch [x]Yes    []No 3 x 30 sec   LAQ's [x]Yes    []No 3 x 10, green TB *   Hamstring Curls  [x]Yes    []No 2 x 10 blue TB*   4 way hip  [x]Yes    []No 15 x each 3# "   Heelslides supine  []Yes    [x]No 30 x   Bridge on swiss ball  [x]Yes    []No 2 x 10    Hamstring curls on swiss ball  [x]Yes    []No    Neuro re-ed (13 min)     Lateral step downs, cuing for controlled eccentrics [x]Yes    []No 2 x 10, 6-inch step, each LE   Forward step ups, cuing for controlled step up as opposed to springing up with bottom foot [x]Yes    []No BLE, 6-inch step, 20-lb dumbbell    Forward step downs  [x]Yes    []No 6 inch step 2 x 10    Single leg modified RDL (keeping L toe on ground for balance) [x]Yes    []No 10 x    Ther-Act (14 min)     Heel Raises []Yes    [x]No 2 x 10, 20 #    Squats [x]Yes    []No 2 x 10, 20#    Sit <> stands, standard height chair, touch and go [x]Yes    []No 2 x 10 green TB*   3 way lunge step  [x]Yes    []No 8 x    Monster Walks  [x]Yes    []No 4 x green TB    Lunges, static; at stairs with handrail and back of chair for support as needed [x]Yes    []No 15 each LE          Patient Education and Home Exercises       Education provided:   - continue current home exercise program, pain free    Written Home Exercises Provided: Patient instructed to cont prior HEP. Exercises were reviewed and Leno was able to demonstrate them prior to the end of the session.  Leno demonstrated good  understanding of the education provided. See Electronic Medical Record under Patient Instructions for exercises provided during therapy sessions    Assessment   PT progressed patient to completing hills program on rec bike to increase LE strength and endurance. PT also added bridging on swiss ball, hamstring curls on swiss ball, and single leg RDLs to continue increasing R lower extremity muscle strength and stability. Patient was unable to complete RDL with L lower extremity off of ground due to decreased balance and stability. He was able to complete exercise with L toe down and demonstrated improved stability with R single leg stance. Patient could benefit from addition of more single leg  activities to increase stability. No adverse effects noted to treatment.    Patient prognosis is Good.     Patient will continue to benefit from skilled outpatient physical therapy to address the deficits listed in the problem list box on initial evaluation, provide pt/family education and to maximize pt's level of independence in the home and community environment.      Anticipated barriers to physical therapy: N/A    Goals:   Short Term Goals: 3 weeks   Patient will independently complete Home exercise program with correct form.  met  Patient will report decreased pain to less than or equal to 3/10 for improved quality of life. met  Patient will independently transfer sit to stand with no UE use for increased independence with community transfers. met     Long Term Goals: 6 weeks   Pt will increase R knee flexion to 120, knee extension to 0 degrees, and quad lag to 0 degrees to allow patient normal gait pattern.  Pt will increase R knee strength to 4/5 to allow patient to safely return to prior level of function.   Pt will report decrease in pain to less than or equal to 2/10 to improve quality of life. met  Pt will complete 5 x sit<> less than or equal to 15 seconds indicating increased LE power. met  Pt will negotiate up and down stairs with a reciprocal pattern for return to stair climbing at work. met  Pt will ambulate with gait speed greater than or equal to 3.25 feet per second.     Plan     Plan of care Certification: 5/22/2024 to 8/9/2024.    Outpatient Physical Therapy 2 times weekly for 6 weeks to include the following interventions: Electrical Stimulation unattended, Gait Training, Manual Therapy, Moist Heat/ Ice, Neuromuscular Re-ed, Patient Education, Therapeutic Activities, and Therapeutic Exercise.      Continue per Plan of Care and progress as pt.  Francisco Bell, PT, DPT     8/1/2024

## 2024-08-06 ENCOUNTER — CLINICAL SUPPORT (OUTPATIENT)
Dept: REHABILITATION | Facility: HOSPITAL | Age: 59
End: 2024-08-06
Payer: COMMERCIAL

## 2024-08-06 DIAGNOSIS — M25.561 CHRONIC PAIN OF RIGHT KNEE: ICD-10-CM

## 2024-08-06 DIAGNOSIS — M17.12 PRIMARY OSTEOARTHRITIS OF LEFT KNEE: ICD-10-CM

## 2024-08-06 DIAGNOSIS — M17.9 OSTEOARTHRITIS OF KNEE, UNSPECIFIED: Primary | ICD-10-CM

## 2024-08-06 DIAGNOSIS — G89.29 CHRONIC PAIN OF RIGHT KNEE: ICD-10-CM

## 2024-08-06 DIAGNOSIS — M17.0 BILATERAL PRIMARY OSTEOARTHRITIS OF KNEE: ICD-10-CM

## 2024-08-06 PROCEDURE — 97112 NEUROMUSCULAR REEDUCATION: CPT | Mod: CQ

## 2024-08-06 PROCEDURE — 97530 THERAPEUTIC ACTIVITIES: CPT | Mod: CQ

## 2024-08-06 PROCEDURE — 97110 THERAPEUTIC EXERCISES: CPT | Mod: CQ

## 2024-08-08 ENCOUNTER — CLINICAL SUPPORT (OUTPATIENT)
Dept: REHABILITATION | Facility: HOSPITAL | Age: 59
End: 2024-08-08
Payer: COMMERCIAL

## 2024-08-08 DIAGNOSIS — M17.9 OSTEOARTHRITIS OF KNEE, UNSPECIFIED: Primary | ICD-10-CM

## 2024-08-08 PROCEDURE — 97112 NEUROMUSCULAR REEDUCATION: CPT

## 2024-08-08 PROCEDURE — 97110 THERAPEUTIC EXERCISES: CPT

## 2024-08-08 PROCEDURE — 97530 THERAPEUTIC ACTIVITIES: CPT

## 2024-08-20 RX ORDER — MONTELUKAST SODIUM 10 MG/1
10 TABLET ORAL NIGHTLY
Qty: 90 TABLET | Refills: 0 | Status: SHIPPED | OUTPATIENT
Start: 2024-08-20

## 2024-09-04 ENCOUNTER — TELEPHONE (OUTPATIENT)
Dept: FAMILY MEDICINE | Facility: CLINIC | Age: 59
End: 2024-09-04
Payer: COMMERCIAL

## 2024-09-04 DIAGNOSIS — E11.9 TYPE 2 DIABETES MELLITUS WITHOUT COMPLICATION, WITHOUT LONG-TERM CURRENT USE OF INSULIN: Primary | ICD-10-CM

## 2024-09-04 NOTE — LETTER
AUTHORIZATION FOR RELEASE OF   CONFIDENTIAL INFORMATION    Dear JonathoneDr,    We are seeing Quintin Sandoval, date of birth 1965, in the clinic at [facility]. Wilfred Menon MD is the patient's PCP. Quintin Sandoval has an outstanding lab/procedure at the time we reviewed his chart. In order to help keep his health information updated, he has authorized us to request the following medical record(s):        (  )  MAMMOGRAM                                      (  )  COLONOSCOPY      (  )  PAP SMEAR                                          (  )  OUTSIDE LAB RESULTS     (  )  DEXA SCAN                                          (X)  EYE EXAM-recent            (  )  FOOT EXAM                                          (  )  ENTIRE RECORD     (  )  OUTSIDE IMMUNIZATIONS                 (  )  _______________         Please fax records to Wilfred Menon MD, [phone]     If you have any questions, please contact Christy at [phone].

## 2024-10-28 ENCOUNTER — OFFICE VISIT (OUTPATIENT)
Dept: FAMILY MEDICINE | Facility: CLINIC | Age: 59
End: 2024-10-28
Payer: COMMERCIAL

## 2024-10-28 VITALS
HEIGHT: 73 IN | DIASTOLIC BLOOD PRESSURE: 98 MMHG | BODY MASS INDEX: 31.25 KG/M2 | TEMPERATURE: 98 F | WEIGHT: 235.81 LBS | SYSTOLIC BLOOD PRESSURE: 138 MMHG | OXYGEN SATURATION: 95 % | HEART RATE: 98 BPM

## 2024-10-28 DIAGNOSIS — J40 BRONCHITIS: ICD-10-CM

## 2024-10-28 DIAGNOSIS — J01.00 ACUTE NON-RECURRENT MAXILLARY SINUSITIS: Primary | ICD-10-CM

## 2024-10-28 PROCEDURE — 3008F BODY MASS INDEX DOCD: CPT | Mod: CPTII,,, | Performed by: FAMILY MEDICINE

## 2024-10-28 PROCEDURE — 3080F DIAST BP >= 90 MM HG: CPT | Mod: CPTII,,, | Performed by: FAMILY MEDICINE

## 2024-10-28 PROCEDURE — 96372 THER/PROPH/DIAG INJ SC/IM: CPT | Mod: ,,, | Performed by: FAMILY MEDICINE

## 2024-10-28 PROCEDURE — 3075F SYST BP GE 130 - 139MM HG: CPT | Mod: CPTII,,, | Performed by: FAMILY MEDICINE

## 2024-10-28 PROCEDURE — 3044F HG A1C LEVEL LT 7.0%: CPT | Mod: CPTII,,, | Performed by: FAMILY MEDICINE

## 2024-10-28 PROCEDURE — 4010F ACE/ARB THERAPY RXD/TAKEN: CPT | Mod: CPTII,,, | Performed by: FAMILY MEDICINE

## 2024-10-28 PROCEDURE — 1159F MED LIST DOCD IN RCRD: CPT | Mod: CPTII,,, | Performed by: FAMILY MEDICINE

## 2024-10-28 PROCEDURE — 99213 OFFICE O/P EST LOW 20 MIN: CPT | Mod: 25,,, | Performed by: FAMILY MEDICINE

## 2024-10-28 RX ORDER — CEFTRIAXONE 1 G/1
1 INJECTION, POWDER, FOR SOLUTION INTRAMUSCULAR; INTRAVENOUS
Status: COMPLETED | OUTPATIENT
Start: 2024-10-28 | End: 2024-10-28

## 2024-10-28 RX ORDER — DEXAMETHASONE SODIUM PHOSPHATE 4 MG/ML
4 INJECTION, SOLUTION INTRA-ARTICULAR; INTRALESIONAL; INTRAMUSCULAR; INTRAVENOUS; SOFT TISSUE
Status: COMPLETED | OUTPATIENT
Start: 2024-10-28 | End: 2024-10-28

## 2024-10-28 RX ORDER — PREDNISONE 10 MG/1
TABLET ORAL
Qty: 18 TABLET | Refills: 0 | Status: SHIPPED | OUTPATIENT
Start: 2024-10-28 | End: 2024-11-06

## 2024-10-28 RX ORDER — AMOXICILLIN AND CLAVULANATE POTASSIUM 500; 125 MG/1; MG/1
1 TABLET, FILM COATED ORAL 2 TIMES DAILY
Qty: 20 TABLET | Refills: 0 | Status: SHIPPED | OUTPATIENT
Start: 2024-10-28 | End: 2024-11-07

## 2024-10-28 RX ORDER — METHYLPREDNISOLONE ACETATE 80 MG/ML
40 INJECTION, SUSPENSION INTRA-ARTICULAR; INTRALESIONAL; INTRAMUSCULAR; SOFT TISSUE
Status: COMPLETED | OUTPATIENT
Start: 2024-10-28 | End: 2024-10-28

## 2024-10-28 RX ADMIN — METHYLPREDNISOLONE ACETATE 40 MG: 80 INJECTION, SUSPENSION INTRA-ARTICULAR; INTRALESIONAL; INTRAMUSCULAR; SOFT TISSUE at 08:10

## 2024-10-28 RX ADMIN — CEFTRIAXONE 1 G: 1 INJECTION, POWDER, FOR SOLUTION INTRAMUSCULAR; INTRAVENOUS at 08:10

## 2024-10-28 RX ADMIN — DEXAMETHASONE SODIUM PHOSPHATE 4 MG: 4 INJECTION, SOLUTION INTRA-ARTICULAR; INTRALESIONAL; INTRAMUSCULAR; INTRAVENOUS; SOFT TISSUE at 08:10

## 2024-10-30 ENCOUNTER — OFFICE VISIT (OUTPATIENT)
Dept: FAMILY MEDICINE | Facility: CLINIC | Age: 59
End: 2024-10-30
Payer: COMMERCIAL

## 2024-10-30 VITALS
WEIGHT: 234.81 LBS | BODY MASS INDEX: 31.12 KG/M2 | TEMPERATURE: 98 F | HEART RATE: 86 BPM | OXYGEN SATURATION: 97 % | DIASTOLIC BLOOD PRESSURE: 88 MMHG | HEIGHT: 73 IN | SYSTOLIC BLOOD PRESSURE: 124 MMHG

## 2024-10-30 DIAGNOSIS — J01.01 ACUTE RECURRENT MAXILLARY SINUSITIS: ICD-10-CM

## 2024-10-30 DIAGNOSIS — R05.9 COUGH, UNSPECIFIED TYPE: Primary | ICD-10-CM

## 2024-10-30 PROCEDURE — 3079F DIAST BP 80-89 MM HG: CPT | Mod: CPTII,,, | Performed by: FAMILY MEDICINE

## 2024-10-30 PROCEDURE — 3008F BODY MASS INDEX DOCD: CPT | Mod: CPTII,,, | Performed by: FAMILY MEDICINE

## 2024-10-30 PROCEDURE — 99213 OFFICE O/P EST LOW 20 MIN: CPT | Mod: 25,,, | Performed by: FAMILY MEDICINE

## 2024-10-30 PROCEDURE — 4010F ACE/ARB THERAPY RXD/TAKEN: CPT | Mod: CPTII,,, | Performed by: FAMILY MEDICINE

## 2024-10-30 PROCEDURE — 1159F MED LIST DOCD IN RCRD: CPT | Mod: CPTII,,, | Performed by: FAMILY MEDICINE

## 2024-10-30 PROCEDURE — 3044F HG A1C LEVEL LT 7.0%: CPT | Mod: CPTII,,, | Performed by: FAMILY MEDICINE

## 2024-10-30 PROCEDURE — 96372 THER/PROPH/DIAG INJ SC/IM: CPT | Mod: ,,, | Performed by: FAMILY MEDICINE

## 2024-10-30 PROCEDURE — 3074F SYST BP LT 130 MM HG: CPT | Mod: CPTII,,, | Performed by: FAMILY MEDICINE

## 2024-10-30 RX ORDER — METHYLPREDNISOLONE ACETATE 80 MG/ML
40 INJECTION, SUSPENSION INTRA-ARTICULAR; INTRALESIONAL; INTRAMUSCULAR; SOFT TISSUE
Status: COMPLETED | OUTPATIENT
Start: 2024-10-30 | End: 2024-10-30

## 2024-10-30 RX ORDER — LINCOMYCIN HYDROCHLORIDE 300 MG/ML
600 INJECTION, SOLUTION INTRAMUSCULAR; INTRAVENOUS; SUBCONJUNCTIVAL
Status: COMPLETED | OUTPATIENT
Start: 2024-10-30 | End: 2024-10-30

## 2024-10-30 RX ORDER — DEXAMETHASONE SODIUM PHOSPHATE 4 MG/ML
4 INJECTION, SOLUTION INTRA-ARTICULAR; INTRALESIONAL; INTRAMUSCULAR; INTRAVENOUS; SOFT TISSUE
Status: COMPLETED | OUTPATIENT
Start: 2024-10-30 | End: 2024-10-30

## 2024-10-30 RX ORDER — ALBUTEROL SULFATE 90 UG/1
2 INHALANT RESPIRATORY (INHALATION) EVERY 6 HOURS PRN
Qty: 18 G | Refills: 0 | Status: SHIPPED | OUTPATIENT
Start: 2024-10-30 | End: 2025-10-30

## 2024-10-30 RX ADMIN — LINCOMYCIN HYDROCHLORIDE 600 MG: 300 INJECTION, SOLUTION INTRAMUSCULAR; INTRAVENOUS; SUBCONJUNCTIVAL at 01:10

## 2024-10-30 RX ADMIN — METHYLPREDNISOLONE ACETATE 40 MG: 80 INJECTION, SUSPENSION INTRA-ARTICULAR; INTRALESIONAL; INTRAMUSCULAR; SOFT TISSUE at 01:10

## 2024-10-30 RX ADMIN — DEXAMETHASONE SODIUM PHOSPHATE 4 MG: 4 INJECTION, SOLUTION INTRA-ARTICULAR; INTRALESIONAL; INTRAMUSCULAR; INTRAVENOUS; SOFT TISSUE at 01:10

## 2024-11-11 ENCOUNTER — CLINICAL SUPPORT (OUTPATIENT)
Dept: FAMILY MEDICINE | Facility: CLINIC | Age: 59
End: 2024-11-11
Payer: COMMERCIAL

## 2024-11-11 VITALS — OXYGEN SATURATION: 96 % | HEART RATE: 81 BPM | DIASTOLIC BLOOD PRESSURE: 86 MMHG | SYSTOLIC BLOOD PRESSURE: 122 MMHG

## 2024-11-11 DIAGNOSIS — J01.01 ACUTE RECURRENT MAXILLARY SINUSITIS: Primary | ICD-10-CM

## 2024-11-11 PROCEDURE — 96372 THER/PROPH/DIAG INJ SC/IM: CPT | Mod: ,,, | Performed by: FAMILY MEDICINE

## 2024-11-11 RX ORDER — DEXAMETHASONE SODIUM PHOSPHATE 4 MG/ML
4 INJECTION, SOLUTION INTRA-ARTICULAR; INTRALESIONAL; INTRAMUSCULAR; INTRAVENOUS; SOFT TISSUE
Status: COMPLETED | OUTPATIENT
Start: 2024-11-11 | End: 2024-11-11

## 2024-11-11 RX ORDER — METHYLPREDNISOLONE ACETATE 80 MG/ML
40 INJECTION, SUSPENSION INTRA-ARTICULAR; INTRALESIONAL; INTRAMUSCULAR; SOFT TISSUE
Status: COMPLETED | OUTPATIENT
Start: 2024-11-11 | End: 2024-11-11

## 2024-11-11 RX ADMIN — DEXAMETHASONE SODIUM PHOSPHATE 4 MG: 4 INJECTION, SOLUTION INTRA-ARTICULAR; INTRALESIONAL; INTRAMUSCULAR; INTRAVENOUS; SOFT TISSUE at 07:11

## 2024-11-11 RX ADMIN — METHYLPREDNISOLONE ACETATE 40 MG: 80 INJECTION, SUSPENSION INTRA-ARTICULAR; INTRALESIONAL; INTRAMUSCULAR; SOFT TISSUE at 07:11

## 2024-11-11 NOTE — PROGRESS NOTES
2 Week BP check. Depo-medrol 40mg/Decadron 4mg Im to left glut for sinus symptoms. Instructed patient importance of monitoring blood sugar readings with steroid injections. Take medication as needed. Patient reports dieting for weight loss and has lost 8+ pounds in past 2 weeks. Patient denies any other needs at present.

## 2024-11-27 ENCOUNTER — OFFICE VISIT (OUTPATIENT)
Dept: FAMILY MEDICINE | Facility: CLINIC | Age: 59
End: 2024-11-27
Payer: COMMERCIAL

## 2024-11-27 VITALS
WEIGHT: 226 LBS | HEART RATE: 69 BPM | SYSTOLIC BLOOD PRESSURE: 114 MMHG | BODY MASS INDEX: 29.95 KG/M2 | HEIGHT: 73 IN | DIASTOLIC BLOOD PRESSURE: 78 MMHG | TEMPERATURE: 98 F | OXYGEN SATURATION: 98 %

## 2024-11-27 DIAGNOSIS — I10 HTN (HYPERTENSION), BENIGN: ICD-10-CM

## 2024-11-27 DIAGNOSIS — R73.09 HEMOGLOBIN A1C LESS THAN 7.0%: ICD-10-CM

## 2024-11-27 DIAGNOSIS — Z12.5 SCREENING FOR PROSTATE CANCER: ICD-10-CM

## 2024-11-27 DIAGNOSIS — E11.9 CONTROLLED TYPE 2 DIABETES MELLITUS WITHOUT COMPLICATION, WITHOUT LONG-TERM CURRENT USE OF INSULIN: Primary | ICD-10-CM

## 2024-11-27 DIAGNOSIS — E78.5 HYPERLIPIDEMIA, UNSPECIFIED HYPERLIPIDEMIA TYPE: ICD-10-CM

## 2024-11-27 LAB
ALBUMIN SERPL BCP-MCNC: 4.2 G/DL (ref 3.5–5)
ALBUMIN/GLOB SERPL: 1.8 {RATIO}
ALP SERPL-CCNC: 61 U/L (ref 40–150)
ALT SERPL W P-5'-P-CCNC: 43 U/L
ANION GAP SERPL CALCULATED.3IONS-SCNC: 13 MMOL/L (ref 7–16)
AST SERPL W P-5'-P-CCNC: 35 U/L (ref 5–34)
BASOPHILS # BLD AUTO: 0.04 K/UL (ref 0–0.2)
BASOPHILS NFR BLD AUTO: 0.6 % (ref 0–1)
BILIRUB SERPL-MCNC: 0.6 MG/DL
BUN SERPL-MCNC: 18 MG/DL (ref 8–26)
BUN/CREAT SERPL: 23 (ref 6–20)
CALCIUM SERPL-MCNC: 9.6 MG/DL (ref 8.4–10.2)
CHLORIDE SERPL-SCNC: 103 MMOL/L (ref 98–107)
CHOLEST SERPL-MCNC: 117 MG/DL
CHOLEST/HDLC SERPL: 3.7 {RATIO}
CO2 SERPL-SCNC: 29 MMOL/L (ref 22–29)
CREAT SERPL-MCNC: 0.77 MG/DL (ref 0.72–1.25)
DIFFERENTIAL METHOD BLD: ABNORMAL
EGFR (NO RACE VARIABLE) (RUSH/TITUS): 104 ML/MIN/1.73M2
EOSINOPHIL # BLD AUTO: 0.28 K/UL (ref 0–0.5)
EOSINOPHIL NFR BLD AUTO: 4.4 % (ref 1–4)
ERYTHROCYTE [DISTWIDTH] IN BLOOD BY AUTOMATED COUNT: 11.8 % (ref 11.5–14.5)
EST. AVERAGE GLUCOSE BLD GHB EST-MCNC: 192 MG/DL
GLOBULIN SER-MCNC: 2.4 G/DL (ref 2–4)
GLUCOSE SERPL-MCNC: 184 MG/DL (ref 74–100)
HBA1C MFR BLD HPLC: 8.3 %
HCT VFR BLD AUTO: 42.6 % (ref 40–54)
HDLC SERPL-MCNC: 32 MG/DL (ref 35–60)
HGB BLD-MCNC: 14.2 G/DL (ref 13.5–18)
IMM GRANULOCYTES # BLD AUTO: 0.01 K/UL (ref 0–0.04)
IMM GRANULOCYTES NFR BLD: 0.2 % (ref 0–0.4)
LDLC SERPL CALC-MCNC: 61 MG/DL
LDLC/HDLC SERPL: 1.9 {RATIO}
LYMPHOCYTES # BLD AUTO: 1.73 K/UL (ref 1–4.8)
LYMPHOCYTES NFR BLD AUTO: 27.5 % (ref 27–41)
MCH RBC QN AUTO: 31.1 PG (ref 27–31)
MCHC RBC AUTO-ENTMCNC: 33.3 G/DL (ref 32–36)
MCV RBC AUTO: 93.4 FL (ref 80–96)
MONOCYTES # BLD AUTO: 0.52 K/UL (ref 0–0.8)
MONOCYTES NFR BLD AUTO: 8.3 % (ref 2–6)
MPC BLD CALC-MCNC: 11 FL (ref 9.4–12.4)
NEUTROPHILS # BLD AUTO: 3.72 K/UL (ref 1.8–7.7)
NEUTROPHILS NFR BLD AUTO: 59 % (ref 53–65)
NONHDLC SERPL-MCNC: 85 MG/DL
NRBC # BLD AUTO: 0 X10E3/UL
NRBC, AUTO (.00): 0 %
PLATELET # BLD AUTO: 229 K/UL (ref 150–400)
POTASSIUM SERPL-SCNC: 5.6 MMOL/L (ref 3.5–5.1)
PROT SERPL-MCNC: 6.6 G/DL (ref 6.4–8.3)
PSA SERPL-MCNC: 0.58 NG/ML
RBC # BLD AUTO: 4.56 M/UL (ref 4.6–6.2)
SODIUM SERPL-SCNC: 139 MMOL/L (ref 136–145)
TRIGL SERPL-MCNC: 120 MG/DL (ref 34–140)
VLDLC SERPL-MCNC: 24 MG/DL
WBC # BLD AUTO: 6.3 K/UL (ref 4.5–11)

## 2024-11-27 PROCEDURE — 99214 OFFICE O/P EST MOD 30 MIN: CPT | Mod: ,,, | Performed by: FAMILY MEDICINE

## 2024-11-27 PROCEDURE — 3074F SYST BP LT 130 MM HG: CPT | Mod: CPTII,,, | Performed by: FAMILY MEDICINE

## 2024-11-27 PROCEDURE — 3008F BODY MASS INDEX DOCD: CPT | Mod: CPTII,,, | Performed by: FAMILY MEDICINE

## 2024-11-27 PROCEDURE — 3078F DIAST BP <80 MM HG: CPT | Mod: CPTII,,, | Performed by: FAMILY MEDICINE

## 2024-11-27 PROCEDURE — 80053 COMPREHEN METABOLIC PANEL: CPT | Mod: ,,, | Performed by: CLINICAL MEDICAL LABORATORY

## 2024-11-27 PROCEDURE — 83036 HEMOGLOBIN GLYCOSYLATED A1C: CPT | Mod: ,,, | Performed by: CLINICAL MEDICAL LABORATORY

## 2024-11-27 PROCEDURE — 82570 ASSAY OF URINE CREATININE: CPT | Mod: ,,, | Performed by: CLINICAL MEDICAL LABORATORY

## 2024-11-27 PROCEDURE — 85025 COMPLETE CBC W/AUTO DIFF WBC: CPT | Mod: ,,, | Performed by: CLINICAL MEDICAL LABORATORY

## 2024-11-27 PROCEDURE — 82043 UR ALBUMIN QUANTITATIVE: CPT | Mod: ,,, | Performed by: CLINICAL MEDICAL LABORATORY

## 2024-11-27 PROCEDURE — 3052F HG A1C>EQUAL 8.0%<EQUAL 9.0%: CPT | Mod: CPTII,,, | Performed by: FAMILY MEDICINE

## 2024-11-27 PROCEDURE — 1159F MED LIST DOCD IN RCRD: CPT | Mod: CPTII,,, | Performed by: FAMILY MEDICINE

## 2024-11-27 PROCEDURE — 80061 LIPID PANEL: CPT | Mod: ,,, | Performed by: CLINICAL MEDICAL LABORATORY

## 2024-11-27 PROCEDURE — 4010F ACE/ARB THERAPY RXD/TAKEN: CPT | Mod: CPTII,,, | Performed by: FAMILY MEDICINE

## 2024-11-27 PROCEDURE — G0103 PSA SCREENING: HCPCS | Mod: ,,, | Performed by: CLINICAL MEDICAL LABORATORY

## 2024-11-27 RX ORDER — MONTELUKAST SODIUM 10 MG/1
10 TABLET ORAL NIGHTLY
Qty: 90 TABLET | Refills: 1 | Status: SHIPPED | OUTPATIENT
Start: 2024-11-27

## 2024-11-27 RX ORDER — FLUTICASONE PROPIONATE 50 MCG
2 SPRAY, SUSPENSION (ML) NASAL DAILY
Qty: 16 G | Refills: 5 | Status: SHIPPED | OUTPATIENT
Start: 2024-11-27

## 2024-11-27 NOTE — PROGRESS NOTES
Wilfred Menon MD   [facility]  [address] [place] [address]     PATIENT NAME: Quintin Sandoval  : 1965  DATE: 24  MRN: [id number]      Billing Provider: Wilfred Menon MD  Level of Service: [place] OFFICE/OUTPT VISIT, EST, LEVL IV, 30-39 MIN  Patient PCP Information       Provider PCP Type    Wilfred Menon MD General            Reason for Visit / Chief Complaint: Diabetes (Check up, labs and med refills. Discuss glipizide. States after his knee surgery over the summer he felt like it was increasing his appetite, so he stopped it. Discuss changing to something different.), Hypertension, and Hyperlipidemia         History of Present Illness / Problem Focused Workflow     Quintin Sandoval presents to the clinic with Diabetes (Check up, labs and med refills. Discuss glipizide. States after his knee surgery over the summer he felt like it was increasing his appetite, so he stopped it. Discuss changing to something different.), Hypertension, and Hyperlipidemia     HPI    Review of Systems     Review of Systems   Constitutional:  Negative for activity change, appetite change, fatigue and fever.   HENT:  Negative for nasal congestion, ear pain, hearing loss, sinus pressure/congestion and sore throat.    Respiratory:  Negative for cough, chest tightness and shortness of breath.    Cardiovascular:  Negative for chest pain and palpitations.   Gastrointestinal:  Negative for abdominal pain and fecal incontinence.   Genitourinary:  Negative for bladder incontinence, difficulty urinating and erectile dysfunction.   Musculoskeletal:  Negative for arthralgias.   Integumentary:  Negative for rash.   Neurological:  Negative for dizziness and headaches.        Medical / Social / Family History     Past Medical History:   Diagnosis Date    Hyperlipidemia        Past Surgical History:   Procedure Laterality Date    ANGIOPLASTY      HERNIA REPAIR      REPAIR OF MENISCUS OF  KNEE Right     SINUS SURGERY      x 4       Social History  Quintin Sandoval  reports that he has never smoked. He has quit using smokeless tobacco. He reports current alcohol use. He reports that he does not use drugs.    Family History  Quintin Sandoval  family history includes Diabetes in his mother; Heart disease in his mother.    Medications and Allergies     Medications  Outpatient Medications Marked as Taking for the 11/27/24 encounter (Office Visit) with Wilfred Menon MD   Medication Sig Dispense Refill    albuterol (VENTOLIN HFA) 90 mcg/actuation inhaler Inhale 2 puffs into the lungs every 6 (six) hours as needed for Wheezing. Rescue 18 g 0    aspirin (ECOTRIN) 81 MG EC tablet Take 81 mg by mouth once daily.      atorvastatin (LIPITOR) 80 MG tablet Take 80 mg by mouth once daily.      azelastine (ASTELIN) 137 mcg (0.1 %) nasal spray Nasal for 30      b complex vitamins tablet Take 1 tablet by mouth once daily.      blood sugar diagnostic Strp Check twice daily and as needed for DMII 100 each 2    blood-glucose meter kit Use as instructed 1 each 0    carvediloL (COREG) 6.25 MG tablet Take 6.25 mg by mouth 2 (two) times daily.      ezetimibe (ZETIA) 10 mg tablet Take 10 mg by mouth every morning.      fexofenadine (ALLEGRA) 180 MG tablet Take 180 mg by mouth once daily.      glipiZIDE (GLUCOTROL) 5 MG tablet Take 1 tablet (5 mg total) by mouth 2 (two) times daily before meals. 180 tablet 0    lancets (LANCETS,THIN) Misc Check twice daily and as needed for DMII 100 each 2    lisinopriL (PRINIVIL,ZESTRIL) 2.5 MG tablet Take 2.5 mg by mouth once daily.      [DISCONTINUED] fluticasone propionate (FLONASE) 50 mcg/actuation nasal spray 2 sprays (100 mcg total) by Each Nostril route once daily. 16 g 5    [DISCONTINUED] montelukast (SINGULAIR) 10 mg tablet TAKE 1 TABLET BY MOUTH EVERY DAY IN THE EVENING 90 tablet 0       Allergies  Review of patient's allergies indicates:  No Known Allergies    Physical  Examination     Vitals:    11/27/24 0735   BP: 114/78   Pulse: 69   Temp: 98 °F (36.7 °C)     Physical Exam  Constitutional:       General: He is not in acute distress.     Appearance: He is not ill-appearing.   HENT:      Head: Normocephalic and atraumatic.      Right Ear: Tympanic membrane and ear canal normal.      Left Ear: Tympanic membrane and ear canal normal.      Nose: Nose normal. No congestion or rhinorrhea.   Eyes:      Pupils: Pupils are equal, round, and reactive to light.   Cardiovascular:      Rate and Rhythm: Normal rate and regular rhythm.      Pulses: Normal pulses.      Heart sounds: No murmur heard.  Pulmonary:      Effort: No respiratory distress.      Breath sounds: No wheezing, rhonchi or rales.   Abdominal:      General: Bowel sounds are normal.      Palpations: Abdomen is soft.      Tenderness: There is no abdominal tenderness.      Hernia: No hernia is present.   Musculoskeletal:      Cervical back: Normal range of motion and neck supple.   Lymphadenopathy:      Cervical: No cervical adenopathy.   Skin:     General: Skin is warm and dry.   Neurological:      Mental Status: He is alert.   Psychiatric:         Behavior: Behavior normal.         Thought Content: Thought content normal.          Assessment and Plan (including Health Maintenance)   :    Plan:         Health Maintenance Due   Topic Date Due    Diabetes Urine Screening  Never done    Foot Exam  Never done    Eye Exam  Never done    HIV Screening  Never done       Problem List Items Addressed This Visit    None  Visit Diagnoses       Controlled type 2 diabetes mellitus without complication, without long-term current use of insulin    -  Primary    Relevant Orders    CBC Auto Differential (Completed)    Comprehensive Metabolic Panel (Completed)    Hemoglobin A1C (Completed)    Lipid Panel (Completed)    Microalbumin/Creatinine Ratio, Urine    Hemoglobin A1c less than 7.0%        Relevant Orders    Hemoglobin A1C (Completed)     Microalbumin/Creatinine Ratio, Urine    HTN (hypertension), benign        Relevant Orders    CBC Auto Differential (Completed)    Comprehensive Metabolic Panel (Completed)    Lipid Panel (Completed)    Hyperlipidemia, unspecified hyperlipidemia type        Relevant Orders    CBC Auto Differential (Completed)    Comprehensive Metabolic Panel (Completed)    Lipid Panel (Completed)    Screening for prostate cancer        Relevant Orders    PSA, Screening (Completed)          Controlled type 2 diabetes mellitus without complication, without long-term current use of insulin  -     CBC Auto Differential; Future; Expected date: 11/27/2024  -     Comprehensive Metabolic Panel; Future; Expected date: 11/27/2024  -     Hemoglobin A1C; Future; Expected date: 11/27/2024  -     Lipid Panel; Future; Expected date: 11/27/2024  -     Microalbumin/Creatinine Ratio, Urine; Future; Expected date: 11/27/2024    Hemoglobin A1c less than 7.0%  -     Hemoglobin A1C; Future; Expected date: 11/27/2024  -     Microalbumin/Creatinine Ratio, Urine; Future; Expected date: 11/27/2024    HTN (hypertension), benign  -     CBC Auto Differential; Future; Expected date: 11/27/2024  -     Comprehensive Metabolic Panel; Future; Expected date: 11/27/2024  -     Lipid Panel; Future; Expected date: 11/27/2024    Hyperlipidemia, unspecified hyperlipidemia type  -     CBC Auto Differential; Future; Expected date: 11/27/2024  -     Comprehensive Metabolic Panel; Future; Expected date: 11/27/2024  -     Lipid Panel; Future; Expected date: 11/27/2024    Screening for prostate cancer  -     PSA, Screening; Future; Expected date: 11/27/2024    Other orders  -     montelukast (SINGULAIR) 10 mg tablet; Take 1 tablet (10 mg total) by mouth every evening.  Dispense: 90 tablet; Refill: 1  -     fluticasone propionate (FLONASE) 50 mcg/actuation nasal spray; 2 sprays (100 mcg total) by Each Nostril route once daily.  Dispense: 16 g; Refill: 5       Health Maintenance  Topics with due status: Not Due       Topic Last Completion Date    Colorectal Cancer Screening 01/28/2021    Lipid Panel 11/27/2024    Hemoglobin A1c 11/27/2024    RSV Vaccine (Age 60+ and Pregnant patients) Not Due       Procedures     No future appointments.     No follow-ups on file.       Signature:  Wilfred Menon MD  [facility]  [address] [place] [address]  [phone] Phone  [phone] Fax    Date of encounter: 11/27/24

## 2024-12-03 ENCOUNTER — OFFICE VISIT (OUTPATIENT)
Dept: OTOLARYNGOLOGY | Facility: CLINIC | Age: 59
End: 2024-12-03
Payer: COMMERCIAL

## 2024-12-03 VITALS — WEIGHT: 226 LBS | BODY MASS INDEX: 29.95 KG/M2 | HEIGHT: 73 IN

## 2024-12-03 DIAGNOSIS — J31.0 CHRONIC RHINITIS: Primary | ICD-10-CM

## 2024-12-03 DIAGNOSIS — J32.8 OTHER CHRONIC SINUSITIS: ICD-10-CM

## 2024-12-03 DIAGNOSIS — R09.81 CHRONIC NASAL CONGESTION: ICD-10-CM

## 2024-12-03 LAB
CREAT UR-MCNC: 283 MG/DL (ref 23–375)
MICROALBUMIN UR-MCNC: 2.3 MG/DL
MICROALBUMIN/CREAT RATIO PNL UR: 8.1 MG/G (ref 0–30)

## 2024-12-03 PROCEDURE — 1159F MED LIST DOCD IN RCRD: CPT | Mod: ,,, | Performed by: OTOLARYNGOLOGY

## 2024-12-03 PROCEDURE — 99204 OFFICE O/P NEW MOD 45 MIN: CPT | Mod: S$PBB,,, | Performed by: OTOLARYNGOLOGY

## 2024-12-03 PROCEDURE — 3008F BODY MASS INDEX DOCD: CPT | Mod: ,,, | Performed by: OTOLARYNGOLOGY

## 2024-12-03 PROCEDURE — 1160F RVW MEDS BY RX/DR IN RCRD: CPT | Mod: ,,, | Performed by: OTOLARYNGOLOGY

## 2024-12-03 PROCEDURE — 99213 OFFICE O/P EST LOW 20 MIN: CPT | Mod: PBBFAC | Performed by: OTOLARYNGOLOGY

## 2024-12-03 PROCEDURE — 3052F HG A1C>EQUAL 8.0%<EQUAL 9.0%: CPT | Mod: ,,, | Performed by: OTOLARYNGOLOGY

## 2024-12-03 PROCEDURE — 99999 PR PBB SHADOW E&M-EST. PATIENT-LVL III: CPT | Mod: PBBFAC,,, | Performed by: OTOLARYNGOLOGY

## 2024-12-03 PROCEDURE — 4010F ACE/ARB THERAPY RXD/TAKEN: CPT | Mod: ,,, | Performed by: OTOLARYNGOLOGY

## 2024-12-03 RX ORDER — AMOXICILLIN AND CLAVULANATE POTASSIUM 500; 125 MG/1; MG/1
1 TABLET, FILM COATED ORAL 2 TIMES DAILY
Qty: 28 TABLET | Refills: 0 | Status: SHIPPED | OUTPATIENT
Start: 2024-12-03

## 2024-12-03 RX ORDER — METHYLPREDNISOLONE 4 MG/1
TABLET ORAL
Qty: 1 EACH | Refills: 0 | Status: SHIPPED | OUTPATIENT
Start: 2024-12-03

## 2024-12-03 NOTE — PROGRESS NOTES
Subjective:       Patient ID: Quintin Sandoval is a 59 y.o. male.    Chief Complaint: Allergy Testing (Patient is requesting allergy testing. States he use to take allergy shots until heart surgery. ) and Sinusitis (He complains of having sinus congestion, drainage, pressure and PND. He complains of his throat being irritated. States he has a history of having sinus surgery.)    Sinusitis  Associated symptoms include congestion and sinus pressure.     Review of Systems   HENT:  Positive for congestion, rhinorrhea, sinus pressure and sinus pain.    All other systems reviewed and are negative.      Objective:      Physical Exam  General: NAD  Head: Normocephalic, atraumatic, no facial asymmetry/normal strength,  Ears: Both auricules normal in appearance, w/o deformities tympanic membranes normal external auditory canals normal  Nose: External nose w/o deformities congested  turbinates + drainage with inflammation  Oral Cavity: Lips, gums, floor of mouth, tongue hard palate, and buccal mucosa without mass/lesion  Oropharynx: Mucosa pink and moist, soft palate, posterior pharynx and oropharyngeal wall without mass/lesion  Neck: Supple, symmetric, trachea midline, no palpable mass/lesion, no palpable cervical lymphadenopathy  Skin: Warm and dry, no concerning lesions  Respiratory: Respirations even, unlabored  Assessment:       1. Chronic rhinitis    2. Chronic nasal congestion    3. Other chronic sinusitis        Plan:       RAST for allergies   Augmentin Medrol   F/u after RAST

## 2024-12-09 RX ORDER — PIOGLITAZONEHYDROCHLORIDE 30 MG/1
30 TABLET ORAL DAILY
Qty: 90 TABLET | Refills: 3 | Status: SHIPPED | OUTPATIENT
Start: 2024-12-09 | End: 2025-12-09

## 2024-12-09 RX ORDER — PIOGLITAZONEHYDROCHLORIDE 30 MG/1
30 TABLET ORAL DAILY
Qty: 30 TABLET | Refills: 0 | Status: SHIPPED | OUTPATIENT
Start: 2024-12-09 | End: 2025-01-08

## 2024-12-16 DIAGNOSIS — R09.81 CHRONIC NASAL CONGESTION: ICD-10-CM

## 2024-12-16 DIAGNOSIS — J31.0 CHRONIC RHINITIS: Primary | ICD-10-CM

## 2025-01-07 ENCOUNTER — TELEPHONE (OUTPATIENT)
Dept: FAMILY MEDICINE | Facility: CLINIC | Age: 60
End: 2025-01-07
Payer: COMMERCIAL

## 2025-01-07 RX ORDER — EMPAGLIFLOZIN 25 MG/1
25 TABLET, FILM COATED ORAL DAILY
COMMUNITY
Start: 2024-12-31

## 2025-01-07 NOTE — TELEPHONE ENCOUNTER
Spoke with patient regarding continuous glucose monitor. He was wanting a CGM due to his sugar being elevated lately. Discussed that insurance only covered if taking insulin or if he had documented hypoglycemic episodes. He verbalized understanding. Stated he recently saw his cardiologist, Dr. Prince, and that he changed his pioglitizone to Jardiance 25 mg daily. States his sugar has been lower and that he has had some weight loss since starting it. Instructed to call or RTC as needed.    ----- Message from Tech L[place]ina sent at 1/6/2025  1:17 PM CST -----  Who Called: Quintin Diaz Dixon    Caller is requesting assistance/information from provider's office.            Preferred Method of Contact: Phone Call  Patient's Preferred Phone Number on File: [phone]   Best Call Back Number, if different:  Additional Information: patient wanted to know if he could get a prescription for a continuous glucose monitor, he said that he had a high A1C TESTING.

## 2025-01-22 ENCOUNTER — OFFICE VISIT (OUTPATIENT)
Dept: FAMILY MEDICINE | Facility: CLINIC | Age: 60
End: 2025-01-22
Payer: COMMERCIAL

## 2025-01-22 VITALS
HEIGHT: 73 IN | TEMPERATURE: 98 F | HEART RATE: 86 BPM | WEIGHT: 212 LBS | SYSTOLIC BLOOD PRESSURE: 108 MMHG | DIASTOLIC BLOOD PRESSURE: 78 MMHG | OXYGEN SATURATION: 97 % | BODY MASS INDEX: 28.1 KG/M2

## 2025-01-22 DIAGNOSIS — R05.1 ACUTE COUGH: ICD-10-CM

## 2025-01-22 DIAGNOSIS — J01.00 ACUTE NON-RECURRENT MAXILLARY SINUSITIS: Primary | ICD-10-CM

## 2025-01-22 PROCEDURE — 96372 THER/PROPH/DIAG INJ SC/IM: CPT | Mod: ,,, | Performed by: FAMILY MEDICINE

## 2025-01-22 PROCEDURE — 3074F SYST BP LT 130 MM HG: CPT | Mod: CPTII,,, | Performed by: FAMILY MEDICINE

## 2025-01-22 PROCEDURE — 99213 OFFICE O/P EST LOW 20 MIN: CPT | Mod: 25,,, | Performed by: FAMILY MEDICINE

## 2025-01-22 PROCEDURE — 1159F MED LIST DOCD IN RCRD: CPT | Mod: CPTII,,, | Performed by: FAMILY MEDICINE

## 2025-01-22 PROCEDURE — 3078F DIAST BP <80 MM HG: CPT | Mod: CPTII,,, | Performed by: FAMILY MEDICINE

## 2025-01-22 PROCEDURE — 3008F BODY MASS INDEX DOCD: CPT | Mod: CPTII,,, | Performed by: FAMILY MEDICINE

## 2025-01-22 RX ORDER — CEFUROXIME AXETIL 250 MG/1
250 TABLET ORAL EVERY 12 HOURS
Qty: 20 TABLET | Refills: 0 | Status: SHIPPED | OUTPATIENT
Start: 2025-01-22

## 2025-01-22 RX ORDER — METHYLPREDNISOLONE ACETATE 80 MG/ML
40 INJECTION, SUSPENSION INTRA-ARTICULAR; INTRALESIONAL; INTRAMUSCULAR; SOFT TISSUE
Status: COMPLETED | OUTPATIENT
Start: 2025-01-22 | End: 2025-01-22

## 2025-01-22 RX ORDER — DEXAMETHASONE SODIUM PHOSPHATE 4 MG/ML
4 INJECTION, SOLUTION INTRA-ARTICULAR; INTRALESIONAL; INTRAMUSCULAR; INTRAVENOUS; SOFT TISSUE
Status: COMPLETED | OUTPATIENT
Start: 2025-01-22 | End: 2025-01-22

## 2025-01-22 RX ORDER — CEFTRIAXONE 1 G/1
1 INJECTION, POWDER, FOR SOLUTION INTRAMUSCULAR; INTRAVENOUS
Status: COMPLETED | OUTPATIENT
Start: 2025-01-22 | End: 2025-01-22

## 2025-01-22 RX ORDER — MUPIROCIN 20 MG/G
OINTMENT TOPICAL 3 TIMES DAILY
Qty: 30 G | Refills: 0 | Status: SHIPPED | OUTPATIENT
Start: 2025-01-22

## 2025-01-22 RX ADMIN — CEFTRIAXONE 1 G: 1 INJECTION, POWDER, FOR SOLUTION INTRAMUSCULAR; INTRAVENOUS at 01:01

## 2025-01-22 RX ADMIN — METHYLPREDNISOLONE ACETATE 40 MG: 80 INJECTION, SUSPENSION INTRA-ARTICULAR; INTRALESIONAL; INTRAMUSCULAR; SOFT TISSUE at 01:01

## 2025-01-22 RX ADMIN — DEXAMETHASONE SODIUM PHOSPHATE 4 MG: 4 INJECTION, SOLUTION INTRA-ARTICULAR; INTRALESIONAL; INTRAMUSCULAR; INTRAVENOUS; SOFT TISSUE at 01:01

## 2025-01-22 NOTE — PROGRESS NOTES
Wilfred Menon MD   [facility]  [address] [place] [address]     PATIENT NAME: Quintin Sandoval  : 1965  DATE: 25  MRN: [id number]      Billing Provider: Wilfred eMnon MD  Level of Service:   Patient PCP Information       Provider PCP Type    Wilfred Menon MD General            Reason for Visit / Chief Complaint: Sinus Problem (Nasal congestion. Eyes dry, itchy.) and Skin Problem (Bumps to forehead, blister left ear, and fever blister to bottom lip - all broke out over the last 2 days)         History of Present Illness / Problem Focused Workflow     Quintin Sandoval presents to the clinic with Sinus Problem (Nasal congestion. Eyes dry, itchy.) and Skin Problem (Bumps to forehead, blister left ear, and fever blister to bottom lip - all broke out over the last 2 days)     HPI    Review of Systems     Review of Systems   Constitutional:  Negative for activity change, appetite change, fatigue and fever.   HENT:  Positive for nasal congestion, rhinorrhea, sinus pressure/congestion and sore throat. Negative for ear pain and hearing loss.    Respiratory:  Positive for cough. Negative for chest tightness and shortness of breath.    Cardiovascular:  Negative for chest pain and palpitations.   Gastrointestinal:  Negative for abdominal pain and fecal incontinence.   Genitourinary:  Negative for bladder incontinence, difficulty urinating and erectile dysfunction.   Musculoskeletal:  Negative for arthralgias.   Integumentary:  Negative for rash.   Neurological:  Negative for dizziness and headaches.      Medical / Social / Family History     Past Medical History:   Diagnosis Date    Hyperlipidemia        Past Surgical History:   Procedure Laterality Date    ANGIOPLASTY      HERNIA REPAIR      REPAIR OF MENISCUS OF KNEE Right     SINUS SURGERY      x 4       Social History  Quintin Sandoval  reports that he has never smoked. He has quit using smokeless  tobacco. He reports current alcohol use. He reports that he does not use drugs.    Family History  Quintin Sandoval  family history includes Diabetes in his mother; Heart disease in his mother.    Medications and Allergies     Medications  Outpatient Medications Marked as Taking for the 1/22/25 encounter (Office Visit) with Wilfred Menon MD   Medication Sig Dispense Refill    albuterol (VENTOLIN HFA) 90 mcg/actuation inhaler Inhale 2 puffs into the lungs every 6 (six) hours as needed for Wheezing. Rescue 18 g 0    aspirin (ECOTRIN) 81 MG EC tablet Take 81 mg by mouth once daily.      atorvastatin (LIPITOR) 80 MG tablet Take 80 mg by mouth once daily.      azelastine (ASTELIN) 137 mcg (0.1 %) nasal spray Nasal for 30      b complex vitamins tablet Take 1 tablet by mouth once daily.      blood sugar diagnostic Strp Check twice daily and as needed for DMII 100 each 2    blood-glucose meter kit Use as instructed 1 each 0    carvediloL (COREG) 6.25 MG tablet Take 6.25 mg by mouth 2 (two) times daily.      ezetimibe (ZETIA) 10 mg tablet Take 10 mg by mouth every morning.      fexofenadine (ALLEGRA) 180 MG tablet Take 180 mg by mouth once daily.      fluticasone propionate (FLONASE) 50 mcg/actuation nasal spray 2 sprays (100 mcg total) by Each Nostril route once daily. 16 g 5    JARDIANCE 25 mg tablet Take 25 mg by mouth once daily.      lancets (LANCETS,THIN) Misc Check twice daily and as needed for DMII 100 each 2    lisinopriL (PRINIVIL,ZESTRIL) 2.5 MG tablet Take 2.5 mg by mouth once daily.      montelukast (SINGULAIR) 10 mg tablet Take 1 tablet (10 mg total) by mouth every evening. 90 tablet 1       Allergies  Review of patient's allergies indicates:  No Known Allergies    Physical Examination     Vitals:    01/22/25 1315   BP: 108/78   Pulse: 86   Temp: 98 °F (36.7 °C)     Physical Exam  Constitutional:       General: He is not in acute distress.     Appearance: He is not ill-appearing.   HENT:      Head:  Normocephalic and atraumatic.      Right Ear: Tympanic membrane and ear canal normal.      Left Ear: Tympanic membrane and ear canal normal.      Nose: Congestion and rhinorrhea present.   Eyes:      Pupils: Pupils are equal, round, and reactive to light.   Cardiovascular:      Rate and Rhythm: Normal rate and regular rhythm.      Pulses: Normal pulses.      Heart sounds: No murmur heard.  Pulmonary:      Effort: No respiratory distress.      Breath sounds: No wheezing, rhonchi or rales.   Abdominal:      General: Bowel sounds are normal.      Palpations: Abdomen is soft.      Tenderness: There is no abdominal tenderness.      Hernia: No hernia is present.   Musculoskeletal:      Cervical back: Normal range of motion and neck supple.   Lymphadenopathy:      Cervical: No cervical adenopathy.   Skin:     General: Skin is warm and dry.   Neurological:      Mental Status: He is alert.   Psychiatric:         Behavior: Behavior normal.         Thought Content: Thought content normal.          Assessment and Plan (including Health Maintenance)   :    Plan:         Health Maintenance Due   Topic Date Due    Foot Exam  Never done    Diabetic Eye Exam  Never done    HIV Screening  Never done    Pneumococcal Vaccines (Age 50+) (1 of 2 - PCV) Never done       Problem List Items Addressed This Visit    None    There are no diagnoses linked to this encounter.   Health Maintenance Topics with due status: Not Due       Topic Last Completion Date    Colorectal Cancer Screening 01/28/2021    Diabetes Urine Screening 11/27/2024    Lipid Panel 11/27/2024    Hemoglobin A1c 11/27/2024    RSV Vaccine (Age 60+ and Pregnant patients) Not Due       Procedures     Future Appointments   Date Time Provider Department Center   4/29/2025  1:15 PM Barbara Rawls MD [facility] DERM [place]   6/16/2025  8:10 AM Tong Soriano MD [facility] ENT [facility]        No follow-ups on file.       Signature:  Wilfred Menon MD  [facility]  [facility]  [address] [place] [address]  [phone] Phone  [phone] Fax    Date of encounter: 1/22/25

## 2025-02-04 ENCOUNTER — TELEPHONE (OUTPATIENT)
Dept: OTOLARYNGOLOGY | Facility: CLINIC | Age: 60
End: 2025-02-04
Payer: COMMERCIAL

## 2025-02-04 NOTE — TELEPHONE ENCOUNTER
Left message on patient's voicemail. Vials haven't been received by clinic yet. I will call him as soon as they arrive to schedule appt.          ----- Message from Gilda sent at 2/4/2025 11:13 AM CST -----  Who Called: Quintin Diaz Dixon    Caller is requesting assistance/information from provider's office.    Pt is calling to speak with allergy nurse to set up an appt to come in and  start his allergy shots.       Preferred Method of Contact: Phone Call  Patient's Preferred Phone Number on File: [phone]   Best Call Back Number, if different:  Additional Information:

## 2025-02-26 ENCOUNTER — TELEPHONE (OUTPATIENT)
Dept: OTOLARYNGOLOGY | Facility: CLINIC | Age: 60
End: 2025-02-26
Payer: COMMERCIAL

## 2025-02-26 NOTE — TELEPHONE ENCOUNTER
Attempted to reach patient. Left message on patient's to return my call.            ----- Message from Thea sent at 2/26/2025  2:57 PM CST -----  Regarding: talk to a nurse  Who Called: Quintin Ivey is requesting assistance/information from provider's office.Symptoms (please be specific): wants to talk to a nurse about getting allergy shots  Preferred Method of Contact: Phone CallPatient's Preferred Phone Number on File: [phone] Best Call Back Number, if different:Additional Information:

## 2025-03-03 ENCOUNTER — PATIENT MESSAGE (OUTPATIENT)
Dept: OTOLARYNGOLOGY | Facility: CLINIC | Age: 60
End: 2025-03-03
Payer: COMMERCIAL

## 2025-03-07 ENCOUNTER — CLINICAL SUPPORT (OUTPATIENT)
Dept: OTOLARYNGOLOGY | Facility: CLINIC | Age: 60
End: 2025-03-07
Payer: COMMERCIAL

## 2025-03-07 DIAGNOSIS — J31.0 CHRONIC RHINITIS: Primary | ICD-10-CM

## 2025-03-07 DIAGNOSIS — J30.89 ALLERGIC RHINITIS DUE TO DERMATOPHAGOIDES FARINAE: ICD-10-CM

## 2025-03-07 DIAGNOSIS — J30.89 ALLERGIC RHINITIS DUE TO DERMATOPHAGOIDES PTERONYSSINUS: ICD-10-CM

## 2025-03-07 DIAGNOSIS — J30.1 ALLERGIC REACTION TO INHALED POLLEN: ICD-10-CM

## 2025-03-07 DIAGNOSIS — J30.81 ALLERGIC RHINITIS DUE TO ANIMAL HAIR AND DANDER: ICD-10-CM

## 2025-03-07 DIAGNOSIS — J30.81 ALLERGIC RHINITIS DUE TO ANIMAL (CAT) (DOG) HAIR AND DANDER: ICD-10-CM

## 2025-03-07 DIAGNOSIS — J30.89 ENVIRONMENTAL AND SEASONAL ALLERGIES: ICD-10-CM

## 2025-03-07 DIAGNOSIS — J30.81 ALLERGY TO DOGS: ICD-10-CM

## 2025-03-07 DIAGNOSIS — J30.1 ALLERGIC RHINITIS DUE TO WEED POLLEN: ICD-10-CM

## 2025-03-07 DIAGNOSIS — J30.81 ALLERGIC TO ANIMAL DANDER: ICD-10-CM

## 2025-03-07 DIAGNOSIS — J30.81 CHRONIC ALLERGIC RHINITIS DUE TO ANIMAL HAIR AND DANDER: ICD-10-CM

## 2025-03-07 DIAGNOSIS — J30.1 ALLERGIC RHINITIS DUE TO GRASS POLLEN: ICD-10-CM

## 2025-03-07 DIAGNOSIS — J30.89 ALLERGIC RHINITIS DUE TO HOUSE DUST MITE: ICD-10-CM

## 2025-03-07 DIAGNOSIS — J30.89 ALLERGIC RHINITIS DUE TO INSECT: ICD-10-CM

## 2025-03-07 DIAGNOSIS — J30.81 ALLERGIC REACTION TO ANIMAL: ICD-10-CM

## 2025-03-07 DIAGNOSIS — J30.81 ALLERGY TO CATS: ICD-10-CM

## 2025-03-07 DIAGNOSIS — R09.81 CHRONIC NASAL CONGESTION: ICD-10-CM

## 2025-03-07 DIAGNOSIS — J30.81 ALLERGIC RHINITIS DUE TO CATS: ICD-10-CM

## 2025-03-07 DIAGNOSIS — J30.81 ALLERGIC RHINITIS DUE TO DOGS: ICD-10-CM

## 2025-03-07 DIAGNOSIS — J30.1 ALLERGIC REACTION TO GRASS POLLEN: ICD-10-CM

## 2025-03-07 DIAGNOSIS — J30.89 OTHER ALLERGIC RHINITIS: ICD-10-CM

## 2025-03-07 DIAGNOSIS — J30.1 ALLERGIC REACTION TO WEED POLLEN: ICD-10-CM

## 2025-03-07 DIAGNOSIS — J30.89 ALLERGIC RHINITIS DUE TO MOLD: ICD-10-CM

## 2025-03-07 DIAGNOSIS — J30.81 ALLERGIC RHINITIS DUE TO DOG HAIR: ICD-10-CM

## 2025-03-07 DIAGNOSIS — J30.1 ALLERGIC RHINITIS DUE TO TREE POLLEN: ICD-10-CM

## 2025-03-07 DIAGNOSIS — J30.1 ALLERGY TO TREES: ICD-10-CM

## 2025-03-07 DIAGNOSIS — J30.1 SEASONAL ALLERGIC RHINITIS DUE TO POLLEN: ICD-10-CM

## 2025-03-07 PROCEDURE — 95117 IMMUNOTHERAPY INJECTIONS: CPT | Mod: PBBFAC | Performed by: OTOLARYNGOLOGY

## 2025-03-07 PROCEDURE — 99999 PR PBB SHADOW E&M-EST. PATIENT-LVL II: CPT | Mod: PBBFAC,,,

## 2025-03-07 PROCEDURE — 95165 ANTIGEN THERAPY SERVICES: CPT | Mod: S$PBB,,, | Performed by: OTOLARYNGOLOGY

## 2025-03-07 PROCEDURE — 95165 ANTIGEN THERAPY SERVICES: CPT | Mod: PBBFAC | Performed by: OTOLARYNGOLOGY

## 2025-03-07 RX ORDER — EPINEPHRINE 0.3 MG/.3ML
1 INJECTION SUBCUTANEOUS ONCE
Qty: 0.3 ML | Refills: 0 | Status: SHIPPED | OUTPATIENT
Start: 2025-03-07 | End: 2025-03-07

## 2025-03-07 NOTE — PROGRESS NOTES
Established patient with Dr. Soriano. See previous note for written order. Allergy injections per Dr. Soriano.   Vial test administered from new vial A.  10 minute vial test mm reactions  Administered right arm at 11:59 AM  3mm observed   Left arm; 3mm observed.  First dose of 0.02ml given.  20 minute mm reaction  12:00 PM   right arm; observation 4mm   Left arm; observation 4mm.

## 2025-03-13 ENCOUNTER — TELEPHONE (OUTPATIENT)
Dept: AUDIOLOGY | Facility: CLINIC | Age: 60
End: 2025-03-13
Payer: COMMERCIAL

## 2025-03-17 ENCOUNTER — CLINICAL SUPPORT (OUTPATIENT)
Dept: OTOLARYNGOLOGY | Facility: CLINIC | Age: 60
End: 2025-03-17
Payer: COMMERCIAL

## 2025-03-17 DIAGNOSIS — J30.1 ALLERGIC REACTION TO GRASS POLLEN: ICD-10-CM

## 2025-03-17 DIAGNOSIS — J30.81 ALLERGIC RHINITIS DUE TO DOG HAIR: ICD-10-CM

## 2025-03-17 DIAGNOSIS — J30.81 ALLERGIC REACTION TO ANIMAL: ICD-10-CM

## 2025-03-17 DIAGNOSIS — J30.81 ALLERGIC RHINITIS DUE TO DOGS: ICD-10-CM

## 2025-03-17 DIAGNOSIS — J30.89 ALLERGIC RHINITIS DUE TO HOUSE DUST MITE: ICD-10-CM

## 2025-03-17 DIAGNOSIS — J30.1 ALLERGIC RHINITIS DUE TO WEED POLLEN: ICD-10-CM

## 2025-03-17 DIAGNOSIS — J30.1 SEASONAL ALLERGIC RHINITIS DUE TO POLLEN: ICD-10-CM

## 2025-03-17 DIAGNOSIS — J30.81 ALLERGIC RHINITIS DUE TO CATS: ICD-10-CM

## 2025-03-17 DIAGNOSIS — J30.89 ALLERGIC RHINITIS DUE TO MOLD: ICD-10-CM

## 2025-03-17 DIAGNOSIS — J30.89 ENVIRONMENTAL AND SEASONAL ALLERGIES: ICD-10-CM

## 2025-03-17 DIAGNOSIS — J30.81 ALLERGY TO CATS: ICD-10-CM

## 2025-03-17 DIAGNOSIS — J30.1 ALLERGY TO TREES: ICD-10-CM

## 2025-03-17 DIAGNOSIS — J30.81 ALLERGIC TO ANIMAL DANDER: ICD-10-CM

## 2025-03-17 DIAGNOSIS — J30.81 ALLERGIC RHINITIS DUE TO ANIMAL HAIR AND DANDER: ICD-10-CM

## 2025-03-17 DIAGNOSIS — J30.89 ALLERGIC RHINITIS DUE TO DERMATOPHAGOIDES FARINAE: ICD-10-CM

## 2025-03-17 DIAGNOSIS — J30.1 ALLERGIC RHINITIS DUE TO GRASS POLLEN: ICD-10-CM

## 2025-03-17 DIAGNOSIS — J30.89 ALLERGIC RHINITIS DUE TO INSECT: ICD-10-CM

## 2025-03-17 DIAGNOSIS — R09.81 CHRONIC NASAL CONGESTION: ICD-10-CM

## 2025-03-17 DIAGNOSIS — J30.1 ALLERGIC REACTION TO INHALED POLLEN: ICD-10-CM

## 2025-03-17 DIAGNOSIS — J30.1 ALLERGIC REACTION TO WEED POLLEN: ICD-10-CM

## 2025-03-17 DIAGNOSIS — J30.89 ALLERGIC RHINITIS DUE TO DERMATOPHAGOIDES PTERONYSSINUS: ICD-10-CM

## 2025-03-17 DIAGNOSIS — J30.81 ALLERGIC RHINITIS DUE TO ANIMAL (CAT) (DOG) HAIR AND DANDER: ICD-10-CM

## 2025-03-17 DIAGNOSIS — J30.89 OTHER ALLERGIC RHINITIS: ICD-10-CM

## 2025-03-17 DIAGNOSIS — J30.81 CHRONIC ALLERGIC RHINITIS DUE TO ANIMAL HAIR AND DANDER: ICD-10-CM

## 2025-03-17 DIAGNOSIS — J30.81 ALLERGY TO DOGS: ICD-10-CM

## 2025-03-17 DIAGNOSIS — J30.1 ALLERGIC RHINITIS DUE TO TREE POLLEN: ICD-10-CM

## 2025-03-17 DIAGNOSIS — J31.0 CHRONIC RHINITIS: Primary | ICD-10-CM

## 2025-03-17 PROCEDURE — 95117 IMMUNOTHERAPY INJECTIONS: CPT | Mod: PBBFAC | Performed by: OTOLARYNGOLOGY

## 2025-03-17 PROCEDURE — 99999 PR PBB SHADOW E&M-EST. PATIENT-LVL II: CPT | Mod: PBBFAC,,,

## 2025-03-17 NOTE — PROGRESS NOTES
Established patient with Dr. Soriano. See previous note for written order. Allergy injections per Dr. Soriano.   20 minute mm reaction  10:53 AM   right arm; observation 5mm   Left arm; observation 5mm.

## 2025-03-25 ENCOUNTER — CLINICAL SUPPORT (OUTPATIENT)
Dept: OTOLARYNGOLOGY | Facility: CLINIC | Age: 60
End: 2025-03-25
Payer: COMMERCIAL

## 2025-03-25 DIAGNOSIS — J30.81 ALLERGY TO CATS: ICD-10-CM

## 2025-03-25 DIAGNOSIS — J30.1 ALLERGIC RHINITIS DUE TO TREE POLLEN: ICD-10-CM

## 2025-03-25 DIAGNOSIS — J30.81 ALLERGIC TO ANIMAL DANDER: ICD-10-CM

## 2025-03-25 DIAGNOSIS — J30.89 OTHER ALLERGIC RHINITIS: ICD-10-CM

## 2025-03-25 DIAGNOSIS — J31.0 CHRONIC RHINITIS: Primary | ICD-10-CM

## 2025-03-25 DIAGNOSIS — J30.89 ALLERGIC RHINITIS DUE TO DERMATOPHAGOIDES FARINAE: ICD-10-CM

## 2025-03-25 DIAGNOSIS — J30.1 ALLERGIC RHINITIS DUE TO GRASS POLLEN: ICD-10-CM

## 2025-03-25 DIAGNOSIS — J30.81 ALLERGIC RHINITIS DUE TO DOGS: ICD-10-CM

## 2025-03-25 DIAGNOSIS — J30.81 ALLERGIC RHINITIS DUE TO CATS: ICD-10-CM

## 2025-03-25 DIAGNOSIS — J30.81 ALLERGY TO DOGS: ICD-10-CM

## 2025-03-25 DIAGNOSIS — J30.1 ALLERGIC REACTION TO GRASS POLLEN: ICD-10-CM

## 2025-03-25 DIAGNOSIS — J30.1 ALLERGIC RHINITIS DUE TO WEED POLLEN: ICD-10-CM

## 2025-03-25 DIAGNOSIS — J30.1 ALLERGY TO TREES: ICD-10-CM

## 2025-03-25 DIAGNOSIS — J30.81 ALLERGIC RHINITIS DUE TO ANIMAL (CAT) (DOG) HAIR AND DANDER: ICD-10-CM

## 2025-03-25 DIAGNOSIS — J30.81 ALLERGIC RHINITIS DUE TO DOG HAIR: ICD-10-CM

## 2025-03-25 DIAGNOSIS — J30.89 ALLERGIC RHINITIS DUE TO HOUSE DUST MITE: ICD-10-CM

## 2025-03-25 DIAGNOSIS — J30.89 ALLERGIC RHINITIS DUE TO DERMATOPHAGOIDES PTERONYSSINUS: ICD-10-CM

## 2025-03-25 DIAGNOSIS — J30.89 ALLERGIC RHINITIS DUE TO MOLD: ICD-10-CM

## 2025-03-25 DIAGNOSIS — J30.1 ALLERGIC REACTION TO INHALED POLLEN: ICD-10-CM

## 2025-03-25 DIAGNOSIS — J30.1 SEASONAL ALLERGIC RHINITIS DUE TO POLLEN: ICD-10-CM

## 2025-03-25 DIAGNOSIS — J30.89 ALLERGIC RHINITIS DUE TO INSECT: ICD-10-CM

## 2025-03-25 DIAGNOSIS — J30.1 ALLERGIC REACTION TO WEED POLLEN: ICD-10-CM

## 2025-03-25 DIAGNOSIS — R09.81 CHRONIC NASAL CONGESTION: ICD-10-CM

## 2025-03-25 DIAGNOSIS — J30.81 ALLERGIC REACTION TO ANIMAL: ICD-10-CM

## 2025-03-25 DIAGNOSIS — J30.89 ENVIRONMENTAL AND SEASONAL ALLERGIES: ICD-10-CM

## 2025-03-25 DIAGNOSIS — J30.81 ALLERGIC RHINITIS DUE TO ANIMAL HAIR AND DANDER: ICD-10-CM

## 2025-03-25 DIAGNOSIS — J30.81 CHRONIC ALLERGIC RHINITIS DUE TO ANIMAL HAIR AND DANDER: ICD-10-CM

## 2025-03-25 PROCEDURE — 99999 PR PBB SHADOW E&M-EST. PATIENT-LVL II: CPT | Mod: PBBFAC,,,

## 2025-03-25 PROCEDURE — 95117 IMMUNOTHERAPY INJECTIONS: CPT | Mod: PBBFAC | Performed by: OTOLARYNGOLOGY

## 2025-03-25 NOTE — PROGRESS NOTES
Established patient with Dr. Soriano. See previous note for written order. Allergy injections per Dr. Soriano.   20 minute mm reaction  5:00 PM   right arm; observation 5mm   Left arm; observation 5mm.

## 2025-03-31 ENCOUNTER — CLINICAL SUPPORT (OUTPATIENT)
Dept: OTOLARYNGOLOGY | Facility: CLINIC | Age: 60
End: 2025-03-31
Payer: COMMERCIAL

## 2025-03-31 DIAGNOSIS — J30.1 ALLERGIC REACTION TO WEED POLLEN: ICD-10-CM

## 2025-03-31 DIAGNOSIS — J30.81 ALLERGIC RHINITIS DUE TO ANIMAL (CAT) (DOG) HAIR AND DANDER: ICD-10-CM

## 2025-03-31 DIAGNOSIS — J30.81 ALLERGY TO DOGS: ICD-10-CM

## 2025-03-31 DIAGNOSIS — J30.81 ALLERGIC REACTION TO ANIMAL: ICD-10-CM

## 2025-03-31 DIAGNOSIS — J30.89 ALLERGIC RHINITIS DUE TO HOUSE DUST MITE: ICD-10-CM

## 2025-03-31 DIAGNOSIS — J30.81 ALLERGIC RHINITIS DUE TO DOG HAIR: ICD-10-CM

## 2025-03-31 DIAGNOSIS — J30.1 ALLERGIC RHINITIS DUE TO GRASS POLLEN: ICD-10-CM

## 2025-03-31 DIAGNOSIS — J30.81 ALLERGY TO CATS: ICD-10-CM

## 2025-03-31 DIAGNOSIS — J30.89 ENVIRONMENTAL AND SEASONAL ALLERGIES: ICD-10-CM

## 2025-03-31 DIAGNOSIS — R09.81 CHRONIC NASAL CONGESTION: ICD-10-CM

## 2025-03-31 DIAGNOSIS — J30.81 ALLERGIC RHINITIS DUE TO DOGS: ICD-10-CM

## 2025-03-31 DIAGNOSIS — J30.81 ALLERGIC RHINITIS DUE TO ANIMAL HAIR AND DANDER: ICD-10-CM

## 2025-03-31 DIAGNOSIS — J30.1 ALLERGIC RHINITIS DUE TO WEED POLLEN: ICD-10-CM

## 2025-03-31 DIAGNOSIS — J30.81 ALLERGIC RHINITIS DUE TO CATS: ICD-10-CM

## 2025-03-31 DIAGNOSIS — J30.1 ALLERGIC REACTION TO INHALED POLLEN: ICD-10-CM

## 2025-03-31 DIAGNOSIS — J30.1 ALLERGY TO TREES: ICD-10-CM

## 2025-03-31 DIAGNOSIS — J30.1 SEASONAL ALLERGIC RHINITIS DUE TO POLLEN: ICD-10-CM

## 2025-03-31 DIAGNOSIS — J30.89 ALLERGIC RHINITIS DUE TO MOLD: ICD-10-CM

## 2025-03-31 DIAGNOSIS — J31.0 CHRONIC RHINITIS: Primary | ICD-10-CM

## 2025-03-31 DIAGNOSIS — J30.1 ALLERGIC REACTION TO GRASS POLLEN: ICD-10-CM

## 2025-03-31 DIAGNOSIS — J30.81 ALLERGIC TO ANIMAL DANDER: ICD-10-CM

## 2025-03-31 DIAGNOSIS — J30.81 CHRONIC ALLERGIC RHINITIS DUE TO ANIMAL HAIR AND DANDER: ICD-10-CM

## 2025-03-31 DIAGNOSIS — J30.89 OTHER ALLERGIC RHINITIS: ICD-10-CM

## 2025-03-31 DIAGNOSIS — J30.89 ALLERGIC RHINITIS DUE TO DERMATOPHAGOIDES PTERONYSSINUS: ICD-10-CM

## 2025-03-31 DIAGNOSIS — J30.89 ALLERGIC RHINITIS DUE TO INSECT: ICD-10-CM

## 2025-03-31 DIAGNOSIS — J30.89 ALLERGIC RHINITIS DUE TO DERMATOPHAGOIDES FARINAE: ICD-10-CM

## 2025-03-31 DIAGNOSIS — J30.1 ALLERGIC RHINITIS DUE TO TREE POLLEN: ICD-10-CM

## 2025-03-31 PROCEDURE — 95117 IMMUNOTHERAPY INJECTIONS: CPT | Mod: PBBFAC | Performed by: OTOLARYNGOLOGY

## 2025-03-31 PROCEDURE — 99999 PR PBB SHADOW E&M-EST. PATIENT-LVL II: CPT | Mod: PBBFAC,,,

## 2025-03-31 NOTE — PROGRESS NOTES
Established patient with Dr. Soriano. See previous note for written order. Allergy injections per Dr. Soriano.   20 minute mm reaction  2:09 PM   right arm; observation 5mm   Left arm; observation 5mm.  Patient took vials home to self administer. Will call with any questions or concerns.

## 2025-04-29 ENCOUNTER — OFFICE VISIT (OUTPATIENT)
Dept: DERMATOLOGY | Facility: CLINIC | Age: 60
End: 2025-04-29
Payer: COMMERCIAL

## 2025-04-29 DIAGNOSIS — L72.0 EPIDERMAL CYST: ICD-10-CM

## 2025-04-29 DIAGNOSIS — L82.1 SEBORRHEIC KERATOSES: Primary | ICD-10-CM

## 2025-04-29 DIAGNOSIS — L73.9 FOLLICULITIS: ICD-10-CM

## 2025-04-29 NOTE — PROGRESS NOTES
[facility]  Sam Rawls MD    [facility] [address] [place] [address]  [phone]    Fax: [phone]    Patient Name: Quintin Sandoval  Medical Record Number: [id number]  PCP: Wilfred Menon MD  Age: 59 y.o. : 1965  Contact: [phone] (home) [phone] (work)    History of Present Illness:     Quintin Sandoval is a 59 y.o.  male here for follow up of folliculitis treated with clindamycin solution that has improved. Patient is concerned with lesion on the R arm.    The patient has no other concerns today.    Review of Systems:     Unremarkable other than mentioned above.     Physical Exam:     General: Relaxed, oriented, alert    Skin examination of the scalp, face, neck, chest, back, abdomen, upper extremities and lower extremities were normal except for as listed below      Assessment and Plan:     1. Seborrheic keratoses   - brown stuck on appearing papules/plaques  - patient educated on benign nature. No treatment necessary unless they become irritated or inflamed       2. Epidermal Cyst  - subcutaneous cyst with prominent follicular pore located on the back      Plan:   Excision  @ 8    3. Folliculitis  -follicular based pustules    Plan:   Clindamycin solution PRN    Counseling  Patient instructed to apply antibacterial soap and/or benzoyl peroxide wash to affected areas in shower.  Expectations: Folliculitis is an infection of the hair follicle that can persist for several weeks.      Sam Rawls MD   Mohs Surgery/Dermatologic Oncology  Dermatology

## 2025-04-30 RX ORDER — CLINDAMYCIN PHOSPHATE 11.9 MG/ML
SOLUTION TOPICAL 2 TIMES DAILY
Qty: 60 ML | Refills: 11 | Status: SHIPPED | OUTPATIENT
Start: 2025-04-30

## 2025-05-06 ENCOUNTER — TELEPHONE (OUTPATIENT)
Dept: GASTROENTEROLOGY | Facility: HOSPITAL | Age: 60
End: 2025-05-06
Payer: COMMERCIAL

## 2025-05-06 DIAGNOSIS — Z12.11 SCREENING FOR COLON CANCER: Primary | ICD-10-CM

## 2025-05-12 ENCOUNTER — CLINICAL SUPPORT (OUTPATIENT)
Dept: OTOLARYNGOLOGY | Facility: CLINIC | Age: 60
End: 2025-05-12
Payer: COMMERCIAL

## 2025-05-12 DIAGNOSIS — J30.1 ALLERGIC REACTION TO INHALED POLLEN: ICD-10-CM

## 2025-05-12 DIAGNOSIS — J30.89 ALLERGIC RHINITIS DUE TO INSECT: ICD-10-CM

## 2025-05-12 DIAGNOSIS — J30.1 ALLERGIC RHINITIS DUE TO TREE POLLEN: ICD-10-CM

## 2025-05-12 DIAGNOSIS — J30.1 ALLERGIC RHINITIS DUE TO WEED POLLEN: ICD-10-CM

## 2025-05-12 DIAGNOSIS — J30.89 ALLERGIC RHINITIS DUE TO DERMATOPHAGOIDES PTERONYSSINUS: ICD-10-CM

## 2025-05-12 DIAGNOSIS — J31.0 CHRONIC RHINITIS: Primary | ICD-10-CM

## 2025-05-12 DIAGNOSIS — J30.1 ALLERGY TO TREES: ICD-10-CM

## 2025-05-12 DIAGNOSIS — J30.1 ALLERGIC RHINITIS DUE TO GRASS POLLEN: ICD-10-CM

## 2025-05-12 DIAGNOSIS — J30.81 ALLERGIC RHINITIS DUE TO ANIMAL (CAT) (DOG) HAIR AND DANDER: ICD-10-CM

## 2025-05-12 DIAGNOSIS — J30.1 ALLERGIC REACTION TO WEED POLLEN: ICD-10-CM

## 2025-05-12 DIAGNOSIS — J30.81 ALLERGIC RHINITIS DUE TO DOGS: ICD-10-CM

## 2025-05-12 DIAGNOSIS — J30.81 ALLERGIC REACTION TO ANIMAL: ICD-10-CM

## 2025-05-12 DIAGNOSIS — J30.81 CHRONIC ALLERGIC RHINITIS DUE TO ANIMAL HAIR AND DANDER: ICD-10-CM

## 2025-05-12 DIAGNOSIS — J30.89 ALLERGIC RHINITIS DUE TO MOLD: ICD-10-CM

## 2025-05-12 DIAGNOSIS — J30.81 ALLERGIC TO ANIMAL DANDER: ICD-10-CM

## 2025-05-12 DIAGNOSIS — J30.89 ALLERGIC RHINITIS DUE TO DERMATOPHAGOIDES FARINAE: ICD-10-CM

## 2025-05-12 DIAGNOSIS — J30.81 ALLERGIC RHINITIS DUE TO ANIMAL HAIR AND DANDER: ICD-10-CM

## 2025-05-12 DIAGNOSIS — J30.81 ALLERGY TO DOGS: ICD-10-CM

## 2025-05-12 DIAGNOSIS — J30.1 ALLERGIC REACTION TO GRASS POLLEN: ICD-10-CM

## 2025-05-12 DIAGNOSIS — J30.81 ALLERGIC RHINITIS DUE TO DOG HAIR: ICD-10-CM

## 2025-05-12 DIAGNOSIS — J30.1 SEASONAL ALLERGIC RHINITIS DUE TO POLLEN: ICD-10-CM

## 2025-05-12 DIAGNOSIS — J30.89 ALLERGIC RHINITIS DUE TO HOUSE DUST MITE: ICD-10-CM

## 2025-05-12 DIAGNOSIS — R09.81 CHRONIC NASAL CONGESTION: ICD-10-CM

## 2025-05-12 DIAGNOSIS — J30.81 ALLERGY TO CATS: ICD-10-CM

## 2025-05-12 DIAGNOSIS — J30.89 OTHER ALLERGIC RHINITIS: ICD-10-CM

## 2025-05-12 DIAGNOSIS — J30.81 ALLERGIC RHINITIS DUE TO CATS: ICD-10-CM

## 2025-05-12 DIAGNOSIS — J30.89 ENVIRONMENTAL AND SEASONAL ALLERGIES: ICD-10-CM

## 2025-05-12 PROCEDURE — 95165 ANTIGEN THERAPY SERVICES: CPT | Mod: PBBFAC | Performed by: OTOLARYNGOLOGY

## 2025-05-12 PROCEDURE — 95117 IMMUNOTHERAPY INJECTIONS: CPT | Mod: PBBFAC | Performed by: OTOLARYNGOLOGY

## 2025-05-12 PROCEDURE — 95165 ANTIGEN THERAPY SERVICES: CPT | Mod: S$PBB,,, | Performed by: OTOLARYNGOLOGY

## 2025-05-12 PROCEDURE — 99999 PR PBB SHADOW E&M-EST. PATIENT-LVL II: CPT | Mod: PBBFAC,,,

## 2025-05-12 NOTE — PROGRESS NOTES
Established patient with Dr. Soriano. See previous note for written order. Allergy injections per Dr. Soriano.   Vial test administered from new vial B.  10 minute vial test mm reactions  Administered right arm at 11:31 AM  3mm observed   Left arm; 3mm observed.  First dose of 0.02ml given.  20 minute mm reaction  11:31 AM   right arm; observation 7mm   Left arm; observation 4mm.  Took vials home.

## 2025-06-04 RX ORDER — MONTELUKAST SODIUM 10 MG/1
10 TABLET ORAL NIGHTLY
Qty: 30 TABLET | Refills: 0 | Status: SHIPPED | OUTPATIENT
Start: 2025-06-04

## 2025-06-16 ENCOUNTER — OFFICE VISIT (OUTPATIENT)
Dept: OTOLARYNGOLOGY | Facility: CLINIC | Age: 60
End: 2025-06-16
Payer: COMMERCIAL

## 2025-06-16 VITALS — HEIGHT: 73 IN | WEIGHT: 212 LBS | BODY MASS INDEX: 28.1 KG/M2

## 2025-06-16 DIAGNOSIS — J30.1 SEASONAL ALLERGIC RHINITIS DUE TO POLLEN: Primary | ICD-10-CM

## 2025-06-16 PROCEDURE — 1159F MED LIST DOCD IN RCRD: CPT | Mod: ,,, | Performed by: OTOLARYNGOLOGY

## 2025-06-16 PROCEDURE — 99214 OFFICE O/P EST MOD 30 MIN: CPT | Mod: PBBFAC | Performed by: OTOLARYNGOLOGY

## 2025-06-16 PROCEDURE — 4010F ACE/ARB THERAPY RXD/TAKEN: CPT | Mod: ,,, | Performed by: OTOLARYNGOLOGY

## 2025-06-16 PROCEDURE — 99999 PR PBB SHADOW E&M-EST. PATIENT-LVL IV: CPT | Mod: PBBFAC,,, | Performed by: OTOLARYNGOLOGY

## 2025-06-16 PROCEDURE — 1160F RVW MEDS BY RX/DR IN RCRD: CPT | Mod: ,,, | Performed by: OTOLARYNGOLOGY

## 2025-06-16 PROCEDURE — 99214 OFFICE O/P EST MOD 30 MIN: CPT | Mod: S$PBB,,, | Performed by: OTOLARYNGOLOGY

## 2025-06-16 PROCEDURE — 3008F BODY MASS INDEX DOCD: CPT | Mod: ,,, | Performed by: OTOLARYNGOLOGY

## 2025-06-16 NOTE — PROGRESS NOTES
Subjective:       Patient ID: Quintin Sandoval is a 59 y.o. male.    Chief Complaint: Allergic Rhinitis  (6 month follow-up on allergies. Pt states he is taking weekly allergy shots at home, doing good, feeling better since starting allergy shots. )    HPI  Review of Systems   HENT:  Negative for congestion, postnasal drip and rhinorrhea.    All other systems reviewed and are negative.      Objective:      Physical Exam  General: NAD  Head: Normocephalic, atraumatic, no facial asymmetry/normal strength,  Ears: Both auricules normal in appearance, w/o deformities tympanic membranes normal external auditory canals normal  Nose: External nose w/o deformities normal turbinates no drainage or inflammation  Oral Cavity: Lips, gums, floor of mouth, tongue hard palate, and buccal mucosa without mass/lesion  Oropharynx: Mucosa pink and moist, soft palate, posterior pharynx and oropharyngeal wall without mass/lesion  Neck: Supple, symmetric, trachea midline, no palpable mass/lesion, no palpable cervical lymphadenopathy  Skin: Warm and dry, no concerning lesions  Respiratory: Respirations even, unlabored    Assessment:       1. Seasonal allergic rhinitis due to pollen        Plan:       Discussed allergy regimen   Continue shots   F/u 6 months

## 2025-06-18 NOTE — PATIENT INSTRUCTIONS
WOUND CARE INSTRUCTIONS    1. Leave your pressure bandage on for 24 hours (unless told to keep it on for 48 hours). You will not need to perform any wound care until this bandage is removed. Please do not get the bandage wet.  2. When you initially begin wound care, you may let the water hit the pressure bandage to loosen it from your skin. The bandage should be removed before bathing/showering.  3. Wash your hands thoroughly before starting wound care. Do not use the same cloth/rag/sponge you would use to wash the remainder of your body as this may introduce bacteria from other areas of your body and possibly cause infection at the surgical site.  4. Please clean the surgery site once to twice daily with a mild liquid soap (i.e. Dove, Cetaphil, Baby shampoo).   5. Dry the area with a fresh Q-tip or clean gauze.  6. Perform Vinegar soak to the area to help prevent infection. Soak the affected area for 5-10 minutes once daily, then pat dry. To make a quart of the vinegar soak, mix 3 tablespoons white vinegar with 1 quart of luke-warm water.   7. Apply a generous amount of Vaseline or Aquaphor to the wound/sutures (If you have been prescribed antibiotic ointment such as Mupirocin or Gentamicin, use this instead of vaseline/aquaphor). If you are not sure of the sanitary condition of any Vaseline/Aquaphor you may have at home, please purchase a new jar or tube.    8. Cut a non-stick bandage pad to fit the area and then use bandaging tape to hold in place. Paper tape is a good option for very sensitive skin types.  9. You will be doing this wound care regimen until sutures are removed   10.  After surgery, you may restart all your medications that were stopped (if applicable).      If your surgical site is on your forehead, or close to the eye area, you will want to use ice packs. Please apply ice packs every hour for 20 minutes while awake. Sleep elevated for the next two nights as this will help decrease the amount of  bruising and swelling you will notice the evening after surgery and into the next morning.   For surgical areas on your arms/legs, try to keep the area elevated above the level of your heart as much as possible. This will help to decrease swelling. Frequent gentle rubbing of your fingers or toes in that area will prevent numbness and stiffness.   If located on your arm/hand, we ask that you do not lift anything heavier than a gallon of milk for two weeks. Keep the arm/hand elevated to help decrease swelling in the wrist and fingers. Do not wear jewelry as impending swelling could cause discomfort.  For surgical areas on your head/neck, do not bend over or stoop down. Do not drop your head, as this increases blood to the surgical area and can induce bleeding. Refrain from use of hair care products, hair coloring, or permanents until sutures have been removed and/or the surgical site has completely healed.    BATHING: Begin bathing/showering once pressure bandage comes off. Do not let direct water pressure hit the surgery site. It is okay if it gets wet, just let the water roll over.    PAIN: Tylenol (Acetaminophen) or NSAIDs such as ibuprofen (Advil) or naproxen (Aleve) are adequate for pain relief in most cases, if you are able to take those medications. Alternating Tylenol (acetaminophen) and NSAIDs at 3 hour intervals works well as these medications work differently. For instance, take 1 g of Tylenol at hour 0, then 200-400 mg of Advil at hour 3 if needed, then 1 g of Tylenol at hour 6 if needed, then 200-400 mg of Advil at hour 9 if needed. Do not exceed the daily limit for either medication, which can be found on the bottle. We try to avoid narcotic medications as much as possible. If you are still having severe pain not managed by the above methods, please call our clinic.    SIGNS OF POSSIBLE INFECTION: Significant redness surrounding the surgery site that is warm to the touch, persistent or worsening pain,  fever or flu-like symptoms, increased swelling to the area, thick yellow discharge, and/or foul odor. Please call our office as soon as possible if you experience any of these symptoms as you may have an infection.     BLEEDING: A mild amount of blood on the bandage is expected. Soaking through the bandage is not normal. If this occurs, remove the soiled bandage and apply uninterrupted pressure for 20 minutes by the clock. If this does not stop the bleeding, hold pressure for another 20 minutes with an ice pack. If bleeding stops, apply a bandage per wound care instructions.     IF THE BLEEDING PERSISTS, PLEASE CALL OUR OFFICE.     Normal office hours:   After hours:     If you have concerns about how your wound is healing and would like to send us a photo, please send us a "[employer] message, or call for instructions on how to securely send an email.

## 2025-06-19 ENCOUNTER — PROCEDURE VISIT (OUTPATIENT)
Dept: DERMATOLOGY | Facility: CLINIC | Age: 60
End: 2025-06-19
Payer: COMMERCIAL

## 2025-06-19 VITALS — SYSTOLIC BLOOD PRESSURE: 114 MMHG | DIASTOLIC BLOOD PRESSURE: 79 MMHG

## 2025-06-19 DIAGNOSIS — D48.9 NEOPLASM OF UNCERTAIN BEHAVIOR: Primary | ICD-10-CM

## 2025-06-19 PROCEDURE — 88304 TISSUE EXAM BY PATHOLOGIST: CPT | Mod: TC,SUR | Performed by: STUDENT IN AN ORGANIZED HEALTH CARE EDUCATION/TRAINING PROGRAM

## 2025-06-19 NOTE — PROGRESS NOTES
[facility]    Sam Rawls MD    Elliptical Excision with Linear Closure    Tumor Type: NUB  Location:  L upper back  Derm-Path Accession #:  n/[id number]  Lesion Size:  1.3 x 1.2 cm  Surgical Margins: 0  Post op size: 1.3 x 1.2 cm  Level of Defect:  fat  Repair Type:  Linear   Repair Length:  3.5 cm  Sutures: 3-0 PDS, 5-0 fastgut  Amount of lidocaine used: 6cc of 2% lidocaine with epi  Primary Surgeon: AARTI Rawls MD      INDICATIONS:  The risks of bleeding, infection, discomfort, incomplete removal, and scar formation were explained to the patient.  All questions were answered.  After informed consent, confirmation of site and identity, and appropriate instructions, the patient underwent the procedure as follows:    PROCEDURE:  With the patient in a supine position, the lesion was outlined with the above margins. An ellipse was designed around the lesion to conform to relaxed skin tension lines in an effort to minimize scarring and deformity.  The patient was then placed in a supine position.  The lesion and surrounding skin were prepped with chlorhexidine, draped, and anesthetized with 1% lidocaine with epinephrine 1:100,100 buffered with 1:10 sodium bicarbonate.  Using a #15 blade, the skin was excised along premarked lines.  The resulting defect extended through deep subcutaneous tissue.  Wound margins were undermined to limit functional deformity/impairment of adjacent structures.  Bleeding vessels were controlled with  monopolar  electrodessication .  A deep placating retention suture was placed to offload tension to the deep margin. The dermis and subcutaneous tissue were closed with buried vertical mattress sutures.  Epidermal approximation was meticulously refined with simple running sutures, resulting in a linear closure with little to no wound tension.  Blood loss was estimated to be less than 5cc.  The area was coated with petrolatum and covered with a non-adherent dressing followed by gauze and  tape.  Postoperative instructions were reviewed per protocol.  The patient left alert and fully oriented.        Sam Rawls MD

## 2025-06-19 NOTE — PROGRESS NOTES
Excision Consult Note    Quintin Sandoval is a 59 y.o. male who is referred by Dr. Rawls for evaluation of a NUB on the back that has grown and become symptomatic.     Recurrent skin cancer: No    Preoperative Risk Factors:  Current Anticoagulants: Yes asprin 81 mg  Endocarditis / Rheumatic Fever hx: No  Immunocompromised: No  Prosthetic joint: Yes bilateral knees in 2024  Congenital heart defect: Yes narrow heart valve  Prosthetic heart valve: No  Diabetic: Yes  Transplant: No  Pacemaker: No  Defibrillator:  No  Prior problem with local anesthesia: No  Tobacco History: Yes] former  Clindamycin Allergy: No  Pregnant: no     Transmissible Diseases:  HIV No  Hepatitis B or C  No      Exam:  Limited skin exam is normal except for a NUB  located on the back  .    Pathologic Findings:  Accession # n/a  Diagnosis: NUB    Assessment and Plan:  Treatment Options : Given the indications and high cure rate, the patient has agreed to proceed with excision  Risks and Benefits : The rationale for excision was explained to the patient. The risks and benefits to therapy were discussed in detail. Specifically, the risks of infection, scarring, bleeding, dehiscence, hematoma, prolonged wound healing, incomplete removal, allergy to anesthesia, nerve injury, inability to clear the lesion and recurrence were addressed. The treatment site was clearly identified and confirmed by the patient.    Plan:  Excision    Sam Rawls MD   Mohs Surgery/Dermatologic Oncology

## 2025-06-23 ENCOUNTER — CLINICAL SUPPORT (OUTPATIENT)
Dept: OTOLARYNGOLOGY | Facility: CLINIC | Age: 60
End: 2025-06-23
Payer: COMMERCIAL

## 2025-06-23 DIAGNOSIS — R09.81 CHRONIC NASAL CONGESTION: ICD-10-CM

## 2025-06-23 DIAGNOSIS — J30.1 ALLERGIC REACTION TO GRASS POLLEN: ICD-10-CM

## 2025-06-23 DIAGNOSIS — J30.81 ALLERGY TO CATS: ICD-10-CM

## 2025-06-23 DIAGNOSIS — J30.89 ENVIRONMENTAL AND SEASONAL ALLERGIES: ICD-10-CM

## 2025-06-23 DIAGNOSIS — J30.1 ALLERGIC RHINITIS DUE TO WEED POLLEN: ICD-10-CM

## 2025-06-23 DIAGNOSIS — J30.89 ALLERGIC RHINITIS DUE TO INSECT: ICD-10-CM

## 2025-06-23 DIAGNOSIS — J30.1 ALLERGIC REACTION TO WEED POLLEN: ICD-10-CM

## 2025-06-23 DIAGNOSIS — J30.81 ALLERGIC RHINITIS DUE TO DOG HAIR: ICD-10-CM

## 2025-06-23 DIAGNOSIS — J30.81 ALLERGIC RHINITIS DUE TO ANIMAL (CAT) (DOG) HAIR AND DANDER: ICD-10-CM

## 2025-06-23 DIAGNOSIS — J30.81 ALLERGIC RHINITIS DUE TO DOGS: ICD-10-CM

## 2025-06-23 DIAGNOSIS — J30.1 ALLERGIC RHINITIS DUE TO GRASS POLLEN: ICD-10-CM

## 2025-06-23 DIAGNOSIS — J30.81 CHRONIC ALLERGIC RHINITIS DUE TO ANIMAL HAIR AND DANDER: ICD-10-CM

## 2025-06-23 DIAGNOSIS — J30.1 ALLERGIC RHINITIS DUE TO TREE POLLEN: ICD-10-CM

## 2025-06-23 DIAGNOSIS — J30.89 ALLERGIC RHINITIS DUE TO DERMATOPHAGOIDES FARINAE: ICD-10-CM

## 2025-06-23 DIAGNOSIS — J31.0 CHRONIC RHINITIS: ICD-10-CM

## 2025-06-23 DIAGNOSIS — J30.89 ALLERGIC RHINITIS DUE TO DERMATOPHAGOIDES PTERONYSSINUS: ICD-10-CM

## 2025-06-23 DIAGNOSIS — J30.1 SEASONAL ALLERGIC RHINITIS DUE TO POLLEN: ICD-10-CM

## 2025-06-23 DIAGNOSIS — J30.81 ALLERGIC TO ANIMAL DANDER: ICD-10-CM

## 2025-06-23 DIAGNOSIS — J30.81 ALLERGY TO DOGS: ICD-10-CM

## 2025-06-23 DIAGNOSIS — J30.89 ALLERGIC RHINITIS DUE TO HOUSE DUST MITE: ICD-10-CM

## 2025-06-23 DIAGNOSIS — J30.89 ALLERGIC RHINITIS DUE TO MOLD: ICD-10-CM

## 2025-06-23 DIAGNOSIS — J30.1 ALLERGY TO TREES: Primary | ICD-10-CM

## 2025-06-23 DIAGNOSIS — J30.81 ALLERGIC RHINITIS DUE TO ANIMAL HAIR AND DANDER: ICD-10-CM

## 2025-06-23 DIAGNOSIS — J30.81 ALLERGIC REACTION TO ANIMAL: ICD-10-CM

## 2025-06-23 DIAGNOSIS — J30.89 OTHER ALLERGIC RHINITIS: ICD-10-CM

## 2025-06-23 DIAGNOSIS — J30.81 ALLERGIC RHINITIS DUE TO CATS: ICD-10-CM

## 2025-06-23 DIAGNOSIS — J30.1 ALLERGIC REACTION TO INHALED POLLEN: ICD-10-CM

## 2025-06-23 PROCEDURE — 95165 ANTIGEN THERAPY SERVICES: CPT | Mod: PBBFAC | Performed by: OTOLARYNGOLOGY

## 2025-06-23 PROCEDURE — 95165 ANTIGEN THERAPY SERVICES: CPT | Mod: S$PBB,,, | Performed by: OTOLARYNGOLOGY

## 2025-06-23 PROCEDURE — 99999 PR PBB SHADOW E&M-EST. PATIENT-LVL II: CPT | Mod: PBBFAC,,,

## 2025-06-23 PROCEDURE — 95117 IMMUNOTHERAPY INJECTIONS: CPT | Mod: PBBFAC | Performed by: OTOLARYNGOLOGY

## 2025-06-23 NOTE — PROGRESS NOTES
Established patient with Dr. Soriano. See previous note for written order. Allergy injections per Dr. Soriano.   Vial test administered from new vial C.  10 minute vial test mm reactions  Administered right arm at 11:44 AM  4mm observed   Left arm; 4mm observed.  First dose of 0.02ml given.  20 minute mm reaction  11:45 AM   right arm; observation 7mm   Left arm; observation 5mm.  Took vials home.

## 2025-06-25 ENCOUNTER — RESULTS FOLLOW-UP (OUTPATIENT)
Dept: DERMATOLOGY | Facility: CLINIC | Age: 60
End: 2025-06-25

## 2025-07-31 ENCOUNTER — TELEPHONE (OUTPATIENT)
Dept: OTOLARYNGOLOGY | Facility: CLINIC | Age: 60
End: 2025-07-31
Payer: COMMERCIAL

## 2025-07-31 NOTE — TELEPHONE ENCOUNTER
Copied from CRM #[id number]. Topic: General Inquiry - Patient Advice  >> Jul 31, 2025  8:17 AM Rosalba wrote:  Who Called: Quintin Sandoval    Patient would like to be scheduled with the allergy nurse to come in to get new vials of medication.     Preferred Method of Contact: Phone Call  Patient's Preferred Phone Number on File: [phone]   Best Call Back Number, if different:  Additional Information: [facility]/pharm[facility] #[id number] - [place], [place] - [address] [address]  Phone: [phone] Fax: [phone]      Attempted to call patient at 8:21 am. Voice mail box full- unable to leave message.

## 2025-08-06 ENCOUNTER — CLINICAL SUPPORT (OUTPATIENT)
Dept: OTOLARYNGOLOGY | Facility: CLINIC | Age: 60
End: 2025-08-06
Payer: COMMERCIAL

## 2025-08-06 DIAGNOSIS — J30.1 ALLERGIC REACTION TO WEED POLLEN: ICD-10-CM

## 2025-08-06 DIAGNOSIS — J30.89 ALLERGIC RHINITIS DUE TO HOUSE DUST MITE: ICD-10-CM

## 2025-08-06 DIAGNOSIS — J30.1 ALLERGIC REACTION TO INHALED POLLEN: ICD-10-CM

## 2025-08-06 DIAGNOSIS — J30.89 ALLERGIC RHINITIS DUE TO DERMATOPHAGOIDES PTERONYSSINUS: ICD-10-CM

## 2025-08-06 DIAGNOSIS — J30.81 ALLERGIC TO ANIMAL DANDER: ICD-10-CM

## 2025-08-06 DIAGNOSIS — J30.1 ALLERGIC RHINITIS DUE TO TREE POLLEN: ICD-10-CM

## 2025-08-06 DIAGNOSIS — J30.81 ALLERGY TO DOGS: ICD-10-CM

## 2025-08-06 DIAGNOSIS — J30.1 ALLERGIC RHINITIS DUE TO WEED POLLEN: ICD-10-CM

## 2025-08-06 DIAGNOSIS — J30.1 ALLERGIC REACTION TO GRASS POLLEN: ICD-10-CM

## 2025-08-06 DIAGNOSIS — J30.81 ALLERGY TO CATS: ICD-10-CM

## 2025-08-06 DIAGNOSIS — J31.0 CHRONIC RHINITIS: ICD-10-CM

## 2025-08-06 DIAGNOSIS — J30.1 ALLERGY TO TREES: Primary | ICD-10-CM

## 2025-08-06 DIAGNOSIS — J30.81 ALLERGIC RHINITIS DUE TO ANIMAL (CAT) (DOG) HAIR AND DANDER: ICD-10-CM

## 2025-08-06 DIAGNOSIS — J30.81 CHRONIC ALLERGIC RHINITIS DUE TO ANIMAL HAIR AND DANDER: ICD-10-CM

## 2025-08-06 DIAGNOSIS — J30.89 ALLERGIC RHINITIS DUE TO MOLD: ICD-10-CM

## 2025-08-06 DIAGNOSIS — J30.81 ALLERGIC REACTION TO ANIMAL: ICD-10-CM

## 2025-08-06 DIAGNOSIS — J30.81 ALLERGIC RHINITIS DUE TO CATS: ICD-10-CM

## 2025-08-06 DIAGNOSIS — J30.1 SEASONAL ALLERGIC RHINITIS DUE TO POLLEN: ICD-10-CM

## 2025-08-06 DIAGNOSIS — R09.81 CHRONIC NASAL CONGESTION: ICD-10-CM

## 2025-08-06 DIAGNOSIS — J30.89 OTHER ALLERGIC RHINITIS: ICD-10-CM

## 2025-08-06 DIAGNOSIS — J30.81 ALLERGIC RHINITIS DUE TO DOGS: ICD-10-CM

## 2025-08-06 DIAGNOSIS — J30.81 ALLERGIC RHINITIS DUE TO ANIMAL HAIR AND DANDER: ICD-10-CM

## 2025-08-06 DIAGNOSIS — J30.81 ALLERGIC RHINITIS DUE TO DOG HAIR: ICD-10-CM

## 2025-08-06 DIAGNOSIS — J30.89 ALLERGIC RHINITIS DUE TO DERMATOPHAGOIDES FARINAE: ICD-10-CM

## 2025-08-06 DIAGNOSIS — J30.89 ENVIRONMENTAL AND SEASONAL ALLERGIES: ICD-10-CM

## 2025-08-06 DIAGNOSIS — J30.89 ALLERGIC RHINITIS DUE TO INSECT: ICD-10-CM

## 2025-08-06 DIAGNOSIS — J30.1 ALLERGIC RHINITIS DUE TO GRASS POLLEN: ICD-10-CM

## 2025-08-06 PROCEDURE — 95165 ANTIGEN THERAPY SERVICES: CPT | Mod: S$PBB,,, | Performed by: OTOLARYNGOLOGY

## 2025-08-06 PROCEDURE — 95117 IMMUNOTHERAPY INJECTIONS: CPT | Mod: PBBFAC | Performed by: OTOLARYNGOLOGY

## 2025-08-06 PROCEDURE — 99999 PR PBB SHADOW E&M-EST. PATIENT-LVL II: CPT | Mod: PBBFAC,,,

## 2025-08-06 PROCEDURE — 95165 ANTIGEN THERAPY SERVICES: CPT | Mod: PBBFAC | Performed by: OTOLARYNGOLOGY

## 2025-08-06 NOTE — PROGRESS NOTES
Established patient with Dr. Soriano. See previous note for written order. Allergy injections per Dr. Soriano.   Vial test administered from new vial D.  10 minute vial test mm reactions  Administered right arm at 9:50 AM  00mm observed   Left arm; 4mm observed.  First dose of 0.02ml given.  20 minute mm reaction  9:51 AM   right arm; observation 4mm   Left arm; observation 3mm.  Took vials home.

## 2025-08-12 RX ORDER — FLUTICASONE PROPIONATE 50 MCG
2 SPRAY, SUSPENSION (ML) NASAL
Qty: 16 ML | Refills: 5 | Status: SHIPPED | OUTPATIENT
Start: 2025-08-12

## 2025-08-18 ENCOUNTER — OFFICE VISIT (OUTPATIENT)
Dept: FAMILY MEDICINE | Facility: CLINIC | Age: 60
End: 2025-08-18
Payer: COMMERCIAL

## 2025-08-18 VITALS
DIASTOLIC BLOOD PRESSURE: 78 MMHG | HEIGHT: 73 IN | BODY MASS INDEX: 28.49 KG/M2 | RESPIRATION RATE: 20 BRPM | TEMPERATURE: 98 F | OXYGEN SATURATION: 99 % | HEART RATE: 87 BPM | WEIGHT: 215 LBS | SYSTOLIC BLOOD PRESSURE: 124 MMHG

## 2025-08-18 DIAGNOSIS — M25.511 ACUTE PAIN OF RIGHT SHOULDER: Primary | ICD-10-CM

## 2025-08-18 PROCEDURE — 1159F MED LIST DOCD IN RCRD: CPT | Mod: CPTII,,, | Performed by: FAMILY MEDICINE

## 2025-08-18 PROCEDURE — 4010F ACE/ARB THERAPY RXD/TAKEN: CPT | Mod: CPTII,,, | Performed by: FAMILY MEDICINE

## 2025-08-18 PROCEDURE — 99213 OFFICE O/P EST LOW 20 MIN: CPT | Mod: 25,,, | Performed by: FAMILY MEDICINE

## 2025-08-18 PROCEDURE — 3074F SYST BP LT 130 MM HG: CPT | Mod: CPTII,,, | Performed by: FAMILY MEDICINE

## 2025-08-18 PROCEDURE — 20610 DRAIN/INJ JOINT/BURSA W/O US: CPT | Mod: RT,,, | Performed by: FAMILY MEDICINE

## 2025-08-18 PROCEDURE — 3078F DIAST BP <80 MM HG: CPT | Mod: CPTII,,, | Performed by: FAMILY MEDICINE

## 2025-08-18 PROCEDURE — 3008F BODY MASS INDEX DOCD: CPT | Mod: CPTII,,, | Performed by: FAMILY MEDICINE

## 2025-08-18 RX ADMIN — METHYLPREDNISOLONE ACETATE 40 MG: 80 INJECTION, SUSPENSION INTRA-ARTICULAR; INTRALESIONAL; INTRAMUSCULAR; SOFT TISSUE at 07:08

## 2025-08-25 ENCOUNTER — PATIENT MESSAGE (OUTPATIENT)
Facility: HOSPITAL | Age: 60
End: 2025-08-25
Payer: COMMERCIAL

## 2025-09-01 RX ORDER — METHYLPREDNISOLONE ACETATE 80 MG/ML
40 INJECTION, SUSPENSION INTRA-ARTICULAR; INTRALESIONAL; INTRAMUSCULAR; SOFT TISSUE
Status: DISCONTINUED | OUTPATIENT
Start: 2025-08-18 | End: 2025-09-01 | Stop reason: HOSPADM